# Patient Record
Sex: FEMALE | Race: WHITE | NOT HISPANIC OR LATINO | Employment: FULL TIME | URBAN - METROPOLITAN AREA
[De-identification: names, ages, dates, MRNs, and addresses within clinical notes are randomized per-mention and may not be internally consistent; named-entity substitution may affect disease eponyms.]

---

## 2017-01-13 ENCOUNTER — HOSPITAL ENCOUNTER (OUTPATIENT)
Dept: INFUSION CENTER | Facility: CLINIC | Age: 57
Discharge: HOME | End: 2017-01-13
Payer: COMMERCIAL

## 2017-01-13 PROCEDURE — 96401 CHEMO ANTI-NEOPL SQ/IM: CPT

## 2017-01-13 RX ADMIN — DENOSUMAB 60 MG: 60 INJECTION SUBCUTANEOUS at 09:18

## 2017-03-03 ENCOUNTER — ALLSCRIPTS OFFICE VISIT (OUTPATIENT)
Dept: OTHER | Facility: OTHER | Age: 57
End: 2017-03-03

## 2017-03-18 ENCOUNTER — APPOINTMENT (OUTPATIENT)
Dept: LAB | Facility: MEDICAL CENTER | Age: 57
End: 2017-03-18
Payer: COMMERCIAL

## 2017-03-18 DIAGNOSIS — M81.0 AGE-RELATED OSTEOPOROSIS WITHOUT CURRENT PATHOLOGICAL FRACTURE: ICD-10-CM

## 2017-03-18 DIAGNOSIS — M84.376A STRESS FRACTURE OF FOOT: ICD-10-CM

## 2017-03-18 DIAGNOSIS — Z51.81 ENCOUNTER FOR THERAPEUTIC DRUG LEVEL MONITORING: ICD-10-CM

## 2017-03-18 DIAGNOSIS — Z79.899 OTHER LONG TERM (CURRENT) DRUG THERAPY: ICD-10-CM

## 2017-03-18 DIAGNOSIS — M06.09 RHEUMATOID ARTHRITIS OF MULTIPLE SITES WITHOUT RHEUMATOID FACTOR (HCC): ICD-10-CM

## 2017-03-18 DIAGNOSIS — E55.9 VITAMIN D DEFICIENCY: ICD-10-CM

## 2017-03-18 DIAGNOSIS — E04.1 NONTOXIC SINGLE THYROID NODULE: ICD-10-CM

## 2017-03-18 DIAGNOSIS — E21.3 HYPERPARATHYROIDISM (HCC): ICD-10-CM

## 2017-03-18 LAB
25(OH)D3 SERPL-MCNC: 35.7 NG/ML (ref 30–100)
ALBUMIN SERPL BCP-MCNC: 3.4 G/DL (ref 3.5–5)
ALP SERPL-CCNC: 234 U/L (ref 46–116)
ALT SERPL W P-5'-P-CCNC: 16 U/L (ref 12–78)
ANION GAP SERPL CALCULATED.3IONS-SCNC: 6 MMOL/L (ref 4–13)
AST SERPL W P-5'-P-CCNC: 10 U/L (ref 5–45)
BASOPHILS # BLD AUTO: 0.04 THOUSANDS/ΜL (ref 0–0.1)
BASOPHILS NFR BLD AUTO: 1 % (ref 0–1)
BILIRUB SERPL-MCNC: 0.2 MG/DL (ref 0.2–1)
BUN SERPL-MCNC: 15 MG/DL (ref 5–25)
CALCIUM SERPL-MCNC: 8.5 MG/DL (ref 8.3–10.1)
CHLORIDE SERPL-SCNC: 106 MMOL/L (ref 100–108)
CO2 SERPL-SCNC: 28 MMOL/L (ref 21–32)
CREAT SERPL-MCNC: 0.59 MG/DL (ref 0.6–1.3)
CRP SERPL QL: 23.8 MG/L
EOSINOPHIL # BLD AUTO: 0.07 THOUSAND/ΜL (ref 0–0.61)
EOSINOPHIL NFR BLD AUTO: 2 % (ref 0–6)
ERYTHROCYTE [DISTWIDTH] IN BLOOD BY AUTOMATED COUNT: 14 % (ref 11.6–15.1)
ERYTHROCYTE [SEDIMENTATION RATE] IN BLOOD: 16 MM/HOUR (ref 0–20)
GFR SERPL CREATININE-BSD FRML MDRD: >60 ML/MIN/1.73SQ M
GLUCOSE SERPL-MCNC: 159 MG/DL (ref 65–140)
HCT VFR BLD AUTO: 38.3 % (ref 34.8–46.1)
HGB BLD-MCNC: 11.8 G/DL (ref 11.5–15.4)
LYMPHOCYTES # BLD AUTO: 1.44 THOUSANDS/ΜL (ref 0.6–4.47)
LYMPHOCYTES NFR BLD AUTO: 32 % (ref 14–44)
MCH RBC QN AUTO: 27.6 PG (ref 26.8–34.3)
MCHC RBC AUTO-ENTMCNC: 30.8 G/DL (ref 31.4–37.4)
MCV RBC AUTO: 90 FL (ref 82–98)
MONOCYTES # BLD AUTO: 0.39 THOUSAND/ΜL (ref 0.17–1.22)
MONOCYTES NFR BLD AUTO: 9 % (ref 4–12)
NEUTROPHILS # BLD AUTO: 2.56 THOUSANDS/ΜL (ref 1.85–7.62)
NEUTS SEG NFR BLD AUTO: 56 % (ref 43–75)
NRBC BLD AUTO-RTO: 0 /100 WBCS
PHOSPHATE SERPL-MCNC: 2.3 MG/DL (ref 2.7–4.5)
PLATELET # BLD AUTO: 285 THOUSANDS/UL (ref 149–390)
PMV BLD AUTO: 10 FL (ref 8.9–12.7)
POTASSIUM SERPL-SCNC: 4.1 MMOL/L (ref 3.5–5.3)
PROT SERPL-MCNC: 7.6 G/DL (ref 6.4–8.2)
PTH-INTACT SERPL-MCNC: 146.9 PG/ML (ref 14–72)
RBC # BLD AUTO: 4.27 MILLION/UL (ref 3.81–5.12)
SODIUM SERPL-SCNC: 140 MMOL/L (ref 136–145)
T4 FREE SERPL-MCNC: 0.77 NG/DL (ref 0.76–1.46)
TSH SERPL DL<=0.05 MIU/L-ACNC: 0.71 UIU/ML (ref 0.36–3.74)
WBC # BLD AUTO: 4.5 THOUSAND/UL (ref 4.31–10.16)

## 2017-03-18 PROCEDURE — 83970 ASSAY OF PARATHORMONE: CPT

## 2017-03-18 PROCEDURE — 84100 ASSAY OF PHOSPHORUS: CPT

## 2017-03-18 PROCEDURE — 80053 COMPREHEN METABOLIC PANEL: CPT

## 2017-03-18 PROCEDURE — 85652 RBC SED RATE AUTOMATED: CPT

## 2017-03-18 PROCEDURE — 86140 C-REACTIVE PROTEIN: CPT

## 2017-03-18 PROCEDURE — 84439 ASSAY OF FREE THYROXINE: CPT

## 2017-03-18 PROCEDURE — 84443 ASSAY THYROID STIM HORMONE: CPT

## 2017-03-18 PROCEDURE — 86480 TB TEST CELL IMMUN MEASURE: CPT

## 2017-03-18 PROCEDURE — 36415 COLL VENOUS BLD VENIPUNCTURE: CPT

## 2017-03-18 PROCEDURE — 85025 COMPLETE CBC W/AUTO DIFF WBC: CPT

## 2017-03-18 PROCEDURE — 82306 VITAMIN D 25 HYDROXY: CPT

## 2017-03-24 ENCOUNTER — ALLSCRIPTS OFFICE VISIT (OUTPATIENT)
Dept: OTHER | Facility: OTHER | Age: 57
End: 2017-03-24

## 2017-03-24 ENCOUNTER — TRANSCRIBE ORDERS (OUTPATIENT)
Dept: ADMINISTRATIVE | Facility: HOSPITAL | Age: 57
End: 2017-03-24

## 2017-03-24 DIAGNOSIS — E21.3 HYPERPARATHYROIDISM, UNSPECIFIED (HCC): Primary | ICD-10-CM

## 2017-03-24 DIAGNOSIS — E04.2 NONTOXIC MULTINODULAR GOITER: ICD-10-CM

## 2017-03-24 LAB — QUANTIFERON-TB GOLD IN TUBE: NORMAL

## 2017-03-27 ENCOUNTER — GENERIC CONVERSION - ENCOUNTER (OUTPATIENT)
Dept: OTHER | Facility: OTHER | Age: 57
End: 2017-03-27

## 2017-04-27 DIAGNOSIS — Z72.0 TOBACCO USE: ICD-10-CM

## 2017-04-27 DIAGNOSIS — E04.2 NONTOXIC MULTINODULAR GOITER: ICD-10-CM

## 2017-04-27 DIAGNOSIS — E21.3 HYPERPARATHYROIDISM (HCC): ICD-10-CM

## 2017-04-27 DIAGNOSIS — E55.9 VITAMIN D DEFICIENCY: ICD-10-CM

## 2017-04-27 DIAGNOSIS — M81.0 AGE-RELATED OSTEOPOROSIS WITHOUT CURRENT PATHOLOGICAL FRACTURE: ICD-10-CM

## 2017-04-28 ENCOUNTER — GENERIC CONVERSION - ENCOUNTER (OUTPATIENT)
Dept: OTHER | Facility: OTHER | Age: 57
End: 2017-04-28

## 2017-04-28 ENCOUNTER — HOSPITAL ENCOUNTER (OUTPATIENT)
Dept: ULTRASOUND IMAGING | Facility: HOSPITAL | Age: 57
Discharge: HOME | End: 2017-04-28
Attending: INTERNAL MEDICINE
Payer: COMMERCIAL

## 2017-04-28 DIAGNOSIS — E21.3 HYPERPARATHYROIDISM (HCC): ICD-10-CM

## 2017-04-28 DIAGNOSIS — E55.9 VITAMIN D DEFICIENCY: ICD-10-CM

## 2017-04-28 DIAGNOSIS — E04.2 NONTOXIC MULTINODULAR GOITER: ICD-10-CM

## 2017-04-28 DIAGNOSIS — Z72.0 TOBACCO USE: ICD-10-CM

## 2017-04-28 DIAGNOSIS — M81.0 AGE-RELATED OSTEOPOROSIS WITHOUT CURRENT PATHOLOGICAL FRACTURE: ICD-10-CM

## 2017-04-28 PROCEDURE — 76536 US EXAM OF HEAD AND NECK: CPT

## 2017-05-24 DIAGNOSIS — E55.9 VITAMIN D DEFICIENCY: ICD-10-CM

## 2017-05-24 DIAGNOSIS — E04.2 NONTOXIC MULTINODULAR GOITER: ICD-10-CM

## 2017-05-24 DIAGNOSIS — Z72.0 TOBACCO USE: ICD-10-CM

## 2017-05-24 DIAGNOSIS — E21.3 HYPERPARATHYROIDISM (HCC): ICD-10-CM

## 2017-05-24 DIAGNOSIS — M81.0 AGE-RELATED OSTEOPOROSIS WITHOUT CURRENT PATHOLOGICAL FRACTURE: ICD-10-CM

## 2017-05-27 ENCOUNTER — APPOINTMENT (OUTPATIENT)
Dept: LAB | Facility: MEDICAL CENTER | Age: 57
End: 2017-05-27
Payer: COMMERCIAL

## 2017-05-27 DIAGNOSIS — M81.0 AGE-RELATED OSTEOPOROSIS WITHOUT CURRENT PATHOLOGICAL FRACTURE: ICD-10-CM

## 2017-05-27 DIAGNOSIS — E04.2 NONTOXIC MULTINODULAR GOITER: ICD-10-CM

## 2017-05-27 DIAGNOSIS — E55.9 VITAMIN D DEFICIENCY: ICD-10-CM

## 2017-05-27 DIAGNOSIS — Z72.0 TOBACCO USE: ICD-10-CM

## 2017-05-27 DIAGNOSIS — E21.3 HYPERPARATHYROIDISM (HCC): ICD-10-CM

## 2017-05-27 LAB
25(OH)D3 SERPL-MCNC: 82.7 NG/ML (ref 30–100)
ALBUMIN SERPL BCP-MCNC: 3.4 G/DL (ref 3.5–5)
ALP SERPL-CCNC: 214 U/L (ref 46–116)
ALT SERPL W P-5'-P-CCNC: 17 U/L (ref 12–78)
ANION GAP SERPL CALCULATED.3IONS-SCNC: 5 MMOL/L (ref 4–13)
AST SERPL W P-5'-P-CCNC: 14 U/L (ref 5–45)
BILIRUB DIRECT SERPL-MCNC: 0.07 MG/DL (ref 0–0.2)
BILIRUB SERPL-MCNC: 0.18 MG/DL (ref 0.2–1)
BUN SERPL-MCNC: 15 MG/DL (ref 5–25)
CALCIUM 24H UR-MCNC: <81.5 MG/24 HRS (ref 42–353)
CALCIUM SERPL-MCNC: 8.8 MG/DL (ref 8.3–10.1)
CHLORIDE SERPL-SCNC: 107 MMOL/L (ref 100–108)
CO2 SERPL-SCNC: 29 MMOL/L (ref 21–32)
CREAT 24H UR-MRATE: 0.7 G/24HR (ref 0.6–1.8)
CREAT SERPL-MCNC: 0.54 MG/DL (ref 0.6–1.3)
GFR SERPL CREATININE-BSD FRML MDRD: >60 ML/MIN/1.73SQ M
GLUCOSE SERPL-MCNC: 80 MG/DL (ref 65–140)
PHOSPHATE SERPL-MCNC: 2.6 MG/DL (ref 2.7–4.5)
POTASSIUM SERPL-SCNC: 4.1 MMOL/L (ref 3.5–5.3)
PROT SERPL-MCNC: 7.5 G/DL (ref 6.4–8.2)
PTH-INTACT SERPL-MCNC: 88.2 PG/ML (ref 14–72)
SODIUM SERPL-SCNC: 141 MMOL/L (ref 136–145)
SPECIMEN VOL UR: 1630 ML
SPECIMEN VOL UR: 1630 ML

## 2017-05-27 PROCEDURE — 80076 HEPATIC FUNCTION PANEL: CPT

## 2017-05-27 PROCEDURE — 84075 ASSAY ALKALINE PHOSPHATASE: CPT

## 2017-05-27 PROCEDURE — 36415 COLL VENOUS BLD VENIPUNCTURE: CPT

## 2017-05-27 PROCEDURE — 80048 BASIC METABOLIC PNL TOTAL CA: CPT

## 2017-05-27 PROCEDURE — 83970 ASSAY OF PARATHORMONE: CPT

## 2017-05-27 PROCEDURE — 84100 ASSAY OF PHOSPHORUS: CPT

## 2017-05-27 PROCEDURE — 82306 VITAMIN D 25 HYDROXY: CPT

## 2017-05-27 PROCEDURE — 84080 ASSAY ALKALINE PHOSPHATASES: CPT

## 2017-05-27 PROCEDURE — 82340 ASSAY OF CALCIUM IN URINE: CPT

## 2017-05-27 PROCEDURE — 82570 ASSAY OF URINE CREATININE: CPT

## 2017-06-01 LAB
ALP BONE CFR SERPL: 9 % (ref 14–68)
ALP INTEST CFR SERPL: 0 % (ref 0–18)
ALP LIVER CFR SERPL: 91 % (ref 18–85)
ALP SERPL-CCNC: 205 IU/L (ref 39–117)

## 2017-06-02 ENCOUNTER — ALLSCRIPTS OFFICE VISIT (OUTPATIENT)
Dept: OTHER | Facility: OTHER | Age: 57
End: 2017-06-02

## 2017-06-20 ENCOUNTER — ALLSCRIPTS OFFICE VISIT (OUTPATIENT)
Dept: OTHER | Facility: OTHER | Age: 57
End: 2017-06-20

## 2017-07-14 ENCOUNTER — HOSPITAL ENCOUNTER (OUTPATIENT)
Dept: INFUSION CENTER | Facility: CLINIC | Age: 57
Discharge: HOME | End: 2017-07-14
Payer: COMMERCIAL

## 2017-07-14 PROCEDURE — 96401 CHEMO ANTI-NEOPL SQ/IM: CPT

## 2017-07-14 RX ORDER — AMOXICILLIN 500 MG
1200 CAPSULE ORAL DAILY
COMMUNITY

## 2017-07-14 RX ADMIN — DENOSUMAB 60 MG: 60 INJECTION SUBCUTANEOUS at 09:20

## 2017-07-22 DIAGNOSIS — M81.0 AGE-RELATED OSTEOPOROSIS WITHOUT CURRENT PATHOLOGICAL FRACTURE: ICD-10-CM

## 2017-08-18 DIAGNOSIS — M81.0 AGE-RELATED OSTEOPOROSIS WITHOUT CURRENT PATHOLOGICAL FRACTURE: ICD-10-CM

## 2017-08-18 DIAGNOSIS — Z79.899 OTHER LONG TERM (CURRENT) DRUG THERAPY: ICD-10-CM

## 2017-08-18 DIAGNOSIS — M06.09 RHEUMATOID ARTHRITIS OF MULTIPLE SITES WITHOUT RHEUMATOID FACTOR (HCC): ICD-10-CM

## 2017-08-18 DIAGNOSIS — Z51.81 ENCOUNTER FOR THERAPEUTIC DRUG LEVEL MONITORING: ICD-10-CM

## 2017-09-11 ENCOUNTER — APPOINTMENT (OUTPATIENT)
Dept: RADIOLOGY | Facility: MEDICAL CENTER | Age: 57
End: 2017-09-11

## 2017-09-11 ENCOUNTER — TRANSCRIBE ORDERS (OUTPATIENT)
Dept: URGENT CARE | Facility: MEDICAL CENTER | Age: 57
End: 2017-09-11

## 2017-09-11 DIAGNOSIS — R52 PAIN: Primary | ICD-10-CM

## 2017-09-11 PROCEDURE — 71100 X-RAY EXAM RIBS UNI 2 VIEWS: CPT

## 2017-09-11 PROCEDURE — 71020 HB CHEST X-RAY 2VW FRONTAL&LATL: CPT

## 2017-09-12 ENCOUNTER — ALLSCRIPTS OFFICE VISIT (OUTPATIENT)
Dept: OTHER | Facility: OTHER | Age: 57
End: 2017-09-12

## 2017-10-20 DIAGNOSIS — E04.2 NONTOXIC MULTINODULAR GOITER: ICD-10-CM

## 2017-10-20 DIAGNOSIS — E21.3 HYPERPARATHYROIDISM (HCC): ICD-10-CM

## 2017-10-20 DIAGNOSIS — M81.0 AGE-RELATED OSTEOPOROSIS WITHOUT CURRENT PATHOLOGICAL FRACTURE: ICD-10-CM

## 2017-10-20 DIAGNOSIS — R74.8 ABNORMAL LEVELS OF OTHER SERUM ENZYMES: ICD-10-CM

## 2017-10-20 DIAGNOSIS — E55.9 VITAMIN D DEFICIENCY: ICD-10-CM

## 2017-11-25 ENCOUNTER — APPOINTMENT (OUTPATIENT)
Dept: LAB | Facility: MEDICAL CENTER | Age: 57
End: 2017-11-25
Payer: COMMERCIAL

## 2017-11-25 DIAGNOSIS — E21.3 HYPERPARATHYROIDISM (HCC): ICD-10-CM

## 2017-11-25 DIAGNOSIS — M81.0 AGE-RELATED OSTEOPOROSIS WITHOUT CURRENT PATHOLOGICAL FRACTURE: ICD-10-CM

## 2017-11-25 DIAGNOSIS — E55.9 VITAMIN D DEFICIENCY: ICD-10-CM

## 2017-11-25 DIAGNOSIS — R74.8 ABNORMAL LEVELS OF OTHER SERUM ENZYMES: ICD-10-CM

## 2017-11-25 DIAGNOSIS — E04.2 NONTOXIC MULTINODULAR GOITER: ICD-10-CM

## 2017-11-25 LAB
25(OH)D3 SERPL-MCNC: 79.9 NG/ML (ref 30–100)
ALBUMIN SERPL BCP-MCNC: 3.2 G/DL (ref 3.5–5)
ALP SERPL-CCNC: 197 U/L (ref 46–116)
ALT SERPL W P-5'-P-CCNC: 18 U/L (ref 12–78)
ANION GAP SERPL CALCULATED.3IONS-SCNC: 4 MMOL/L (ref 4–13)
AST SERPL W P-5'-P-CCNC: 14 U/L (ref 5–45)
BILIRUB DIRECT SERPL-MCNC: 0.11 MG/DL (ref 0–0.2)
BILIRUB SERPL-MCNC: 0.33 MG/DL (ref 0.2–1)
BUN SERPL-MCNC: 11 MG/DL (ref 5–25)
CALCIUM SERPL-MCNC: 9.3 MG/DL (ref 8.3–10.1)
CHLORIDE SERPL-SCNC: 104 MMOL/L (ref 100–108)
CO2 SERPL-SCNC: 30 MMOL/L (ref 21–32)
CREAT SERPL-MCNC: 0.59 MG/DL (ref 0.6–1.3)
GFR SERPL CREATININE-BSD FRML MDRD: 103 ML/MIN/1.73SQ M
GLUCOSE SERPL-MCNC: 88 MG/DL (ref 65–140)
PHOSPHATE SERPL-MCNC: 3 MG/DL (ref 2.7–4.5)
POTASSIUM SERPL-SCNC: 4.5 MMOL/L (ref 3.5–5.3)
PROT SERPL-MCNC: 7.7 G/DL (ref 6.4–8.2)
PTH-INTACT SERPL-MCNC: 39.5 PG/ML (ref 14–72)
SODIUM SERPL-SCNC: 138 MMOL/L (ref 136–145)
T4 FREE SERPL-MCNC: 0.87 NG/DL (ref 0.76–1.46)
TSH SERPL DL<=0.05 MIU/L-ACNC: 1.04 UIU/ML (ref 0.36–3.74)

## 2017-11-25 PROCEDURE — 84100 ASSAY OF PHOSPHORUS: CPT

## 2017-11-25 PROCEDURE — 82306 VITAMIN D 25 HYDROXY: CPT

## 2017-11-25 PROCEDURE — 36415 COLL VENOUS BLD VENIPUNCTURE: CPT

## 2017-11-25 PROCEDURE — 80076 HEPATIC FUNCTION PANEL: CPT

## 2017-11-25 PROCEDURE — 84439 ASSAY OF FREE THYROXINE: CPT

## 2017-11-25 PROCEDURE — 83970 ASSAY OF PARATHORMONE: CPT

## 2017-11-25 PROCEDURE — 80048 BASIC METABOLIC PNL TOTAL CA: CPT

## 2017-11-25 PROCEDURE — 84443 ASSAY THYROID STIM HORMONE: CPT

## 2017-11-30 ENCOUNTER — GENERIC CONVERSION - ENCOUNTER (OUTPATIENT)
Dept: OTHER | Facility: OTHER | Age: 57
End: 2017-11-30

## 2017-12-01 ENCOUNTER — GENERIC CONVERSION - ENCOUNTER (OUTPATIENT)
Dept: OTHER | Facility: OTHER | Age: 57
End: 2017-12-01

## 2018-01-09 NOTE — RESULT NOTES
Message   pls get the affirma results when available     Verified Results  (1) FINE NEEDLE ASPIRATION [id number] 10:05AM Jimbo Chairez     Test Name Result Flag Reference   LAB AP CASE REPORT (Report)     Non-gynecologic Cytology             Case: [id number]                  Authorizing Provider: Surendra Govea MD      Collected:      06/09/2016 1005        Ordering Location:   [address] [place] [address]   Received:      06/09/2016 [address] [place]  Ultrasound                              Pathologist:      Yi Kidd MD                              Specimens:  A) - Thyroid, Left, mid pole                                      B) - Thyroid, Left, mid pole   LAB AP CYTO FINAL DIAGNOSIS (Report)     A - B  Thyroid, left, mid pole (Thin prep and smear preparations)  Follicular lesion of undetermined significance ([place] Category III) -   See note  Atypical follicular cells with some overlapping/crowding and scant   colloid  Satisfactory for evaluation  Note:  (1) As reported in the [facility] for Reporting Thyroid   Cytopathology*, this diagnostic category has demonstrated anywhere from 5%   - 15% risk of malignancy being found in subsequent resections  The manual   reports that the usual management following this diagnosis is repeating   the FNA, no sooner than 3 months time (sooner if sudden change in size   occurs)  Ultimately, clinical/imaging correlation for this patient is   needed in arriving at the actual management plan  *The [place] System for Reporting Thyroid Cytopathology, Ebb Sessions ;   Abdiaziz Bassett [facility] ) 2010 (1st Ed )  Interpretation performed at [facility], [address] [facility] [address]   LAB AP NONGYN NOTE (Report)     The treating physician has requested a sample(s) from the above thyroid   nodule(s) be sent for analysis by  [facility] Gene Expression  ([facility] performed by [facility] [address] [place] [address])   [facility] only performs this test on specimen(s)   receiving a cytologic diagnosis of [place] Category III or IV  If such a   diagnosis is rendered (above) specimen will be sent to THE [facility] [place] [place], and a   separate report with results of Afirma GEC will follow directly [facility]   [facility] (typically taking 14 days)  Since the cytologic interpretation is   [place] category III, the specimen will be sent for Afirma GEC testing,   to be separately reported by the THE [facility] [place] laboratory with a copy posted to   the medical record  LAB AP INTRAOPERATIVE CONSULTATION      Thyroid, left mid pole, FNAB on-site evaluation: Not adequate- request   more passes    [facility]  LAB AP GROSS DESCRIPTION      A  Thyroid, Left, mid pole: 20ml  Bloody, received in CytoLyt    B  Thyroid, Left, mid pole: 10 slides received ( 5 diff quik / 5 alcohol   fixed )   LAB AP NONGYN ADDITIONAL INFORMATION (Report)     [employer]'s FDA approved ,  and ThinPrep Imaging System are   utilized with strict adherence to the 's instruction manual to   prepare gynecologic and non-gynecologic cytology specimens for the   production of ThinPrep slides as well as for gynecologic ThinPrep imaging  These processes have been validated by our laboratory and/or by the     These tests were developed and their performance characteristics   determined by [facility] or [address] [place] [address]  They may not be cleared or approved by the [place]  Food and   Drug Administration  The FDA has determined that such clearance or   approval is not necessary  These tests are used for clinical purposes  They should not be regarded as investigational or for research  This   laboratory has been approved by [place] [id number], designated as a high-complexity   laboratory and is qualified to perform these tests  LAB AP CLINICAL INFORMATION      Size: 1 26X 83X1CM   Margins: SMOOTH Echogenicity: SOLID/CYSTIC Microcalcs: ABSENT Flow: INTRANODULAR/PERIPHERAL Size change: MINIMAL Suspicion level: NOT GIVEN Hx of Hashimoto's Thyroiditis: NO     US PATHOLOGY REPORT (NO CHARGE) [id number] 09:42AM Tomas Link    Order Number: [id number]   Performing Comments: plsy left upper and mid pole nodules which were FLUS on last fna results  pls use AFFIRMA   - Patient Instructions: To schedule this appointment, please contact Central Scheduling at [phone]  Test Name Result Flag Reference   US PATHOLOGY REPORT (NO CHARGE) (Report)     Dear Dr Slaughter Lights:     Thank you for referring the above named patient for ultrasound-guided fine needle aspiration biopsy of the thyroid  I have reviewed the cytology report provided by Dr Art Fu  You should have received a copy of the report, but if not, you may access    the report through the [de-identified] Web Portal or by contacting the lab office at [phone]  The results are suspicious for follicular lesion of undetermined significance  The following is an excerpt from the cytology report; left mid pole, follicular lesion of undetermined significance ([place] category 3)  These findings are concordant with imaging  The samples have been sent for [facility] testing, a report of which will be added to the patient's medical record separately  If there are any questions or concerns, please do not hesitate to contact me at [phone]       Sincerely,       Wing Knutson          Workstation performed: [id number]

## 2018-01-10 NOTE — PROGRESS NOTES
Assessment    1  Rheumatoid arthritis of multiple sites with negative rheumatoid factor (714 0) (M06 09)   2  Encounter for monitoring leflunomide therapy (V58 83,V58 69) (Z51 81,Z41 899)   3  Long-term use of hydroxychloroquine (V58 69) (Z50 899)    Plan    1  Follow-up visit in 2 months Evaluation and Treatment  Follow-up  Status: Hold For -   Scheduling  Requested for: 98MNG5768    2  Call [phone] if: The pain seems worse ; Status:Complete;   Done: 03UHT9746   3  Call [phone] if: The symptoms seem worse ; Status:Complete;   Done:   51FUA9258   4  Call [phone] if: You have questions or concerns about your problem ;   Status:Complete;   Done: 09RGN4055    Discussion/Summary    Ms Jihan Chandler was diagnosed with a parathyroid adenoma since her last evaluation  She had an extensive Endocrinology workup, and was found to be severely osteoporotic because of the hyperparathyroidism  She is scheduled to undergo a parathyroidectomy on Thursday of next week  She continues to have significant pain in her left wrist, as well as her right shoulder  She does report that her right shoulder pain worsened after the cortisone injection in her last visit  She has been using Aspercreme on her left wrist and shoulder, which seems to improve her pain, as well as decrease the swelling in her left wrist  She reports that her right ankle has been feeling relatively well, and the swelling has decreased on its own  She continues to have significant weight loss as well as a poor appetite, which she attributes to the hypercalcemia  On exam, there is synovitis of the left wrist with decreased range of motion of the left wrist  There is tenderness to palpation of the right shoulder and right ankle  There is no other active synovitis  Review of laboratory studies revealed a markedly elevated alkaline phosphatase at 210  A DEXA scan did show significant osteoporosis  CBC was within normal limits   ESR and CRP were elevated in December  At this time, her rheumatoid arthritis does appear active despite leflunomide and Plaquenil  It is possible that the Plaquenil has not reach its full efficacy yet, as she is only been on it for less than 2 months  I will not make any changes to her medication regimen at this time, as she is undergoing surgery next week  I will plan to reevaluate her in 2 months  If she continues to remain symptomatic at that time, we may consider adjusting her medications  She will call in interim if there are any questions or concerns  Counseling  Rheumatology Counseling Documentation: The patient was counseled regarding diagnostic results, instructions for management and impressions  Chief Complaint  F/U RA   Patient is here today for follow up of chronic conditions described in HPI  History of Present Illness  Pt  returns for F/U for RA  Had a parathyroid adenoma which is to be removed next weak  Has severe OP due to this  Still with L wrist pain, which is improved greatly with Aspercreme  Also with R shoulder pain, which was worsened with cortisone injection  Has multiple thyroid nodules as well  Ankle pain has improved since last visit  Taking Advil as needed for pain with good relief  + swelling in L wrist -> improved  + AM stiffness x several minutes  + difficulty sleeping -> not due to pain  + non-restorative sleep  + fatigue  RAPID3: 4 8/30      Review of Systems    Constitutional: fatigue, recent 5-6 lb weight loss and anorexia, but no fever, no recent weight gain and no chills  HEENT: blurred vision, dryness mouth and feeling congested, but no double vision, no amaurosis fugax, no dryness of the eyes, no eye pain, no erythema eye(s), no mouth sores and no sore throat  Cardiovascular: no chest pain or pressure, no dyspnea on exertion and no swelling in the arms or legs  Respiratory: no unusual or persistent cough, no shortness of breath and no pleurisy     Gastrointestinal: no abdominal pain, no vomiting, no heartburn, no diarrhea, no constipation, no melena and no BRBPR    The patient presents with complaints of occasional episodes of nausea  Genitourinary: no foamy urine and increased frequency, but no dysuria and no hematuria  Integumentary alopecia, but no rash, no Raynaud's, no nail changes and no photosensitivity  Endocrine heat or cold intolerance, but no polyuria and no polydipsia  Hematologic/Lymphatic: a tendency for easy bruising, but no unusual bleeding  Neurological: tingling, but no headache, no vertigo or dizziness and no weakness  Psychiatric: insomnia and non-restorative sleep  Active Problems    1  CRP elevated (790 95) (R79 82)   2  Elevated alkaline phosphatase level (790 5) (R74 8)   3  Encounter for monitoring leflunomide therapy (V58 83,V58 69) (Z51 81,Z79 899)   4  History of anemia (V12 3) (Z86 2)   5  Hypercalcemia (275 42) (E83 52)   6  Hyperparathyroidism (252 00) (E21 3)   7  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)   8  Multiple thyroid nodules (241 1) (E04 2)   9  Osteoporosis (733 00) (M81 0)   10  Parathyroid adenoma (227 1) (D35 1)   11  Rheumatoid arthritis of multiple sites with negative rheumatoid factor (714 0) (M06 09)   12  Rotator cuff tendinitis, right (726 10) (M75 81)   13  Seizure disorder (345 90) (G40 909)   14  Swelling of joint of left wrist (719 03) (M25 432)   15  Thyroid nodule (241 0) (E04 1)   16  Vitamin D deficiency (268 9) (E55 9)    Past Medical History    1  History of anemia (V12 3) (Z86 2)   2  History of arthritis (V13 4) (Z87 39)   3  History of cataract (V12 49) (Z86 69)   4  History of thyroid disease (V12 29) (Z86 39)   5  History of Left wrist pain (719 43) (M25 532)   6  Seizure disorder (345 90) (G40 909)   7  History of Seronegative rheumatoid arthritis (714 0) (M06 00)    The active problems and past medical history were reviewed and updated today  Surgical History    1   History of Arthrodesis Thumb Carpometacarpal Joint   2  History of Bunion Correction With Metatarsal Osteotomy   3  History of Cataract Surgery   4  History of Tubal Ligation   5  History of Wrist Surgery    The surgical history was reviewed and updated today  Family History    1  Family history of arthritis (V17 7) (Z82 61)    2  Family history of malignant neoplasm of thyroid (V16 8) (Z80 8)    3  Family history of rheumatoid arthritis (V17 7) (Z82 61)    4  Family history of Diabetes (250 00) (E11 9)   5  Denied: Family history of psoriasis   6  Family history of systemic lupus erythematosus (V19 4) (Z82 69)   7  Denied: Family history of ulcerative colitis    The family history was reviewed and updated today  Social History    · Current smoker (305 1) (F17 200)   · Drinks coffee   · No drug use   · Occasional alcohol use  The social history was reviewed and updated today  The social history was reviewed and is unchanged  Current Meds   1  Advil 200 MG Oral Tablet; TAKE 2 TABLET 3 times daily PRN pain; Therapy: (Recorded:95Fgr0751) to Recorded   2  Hydroxychloroquine Sulfate 200 MG Oral Tablet; take 1 tablet daily with food; Therapy: 21IZQ0797 to (Evaluate:15Jun2016)  Requested for: 50DMT6667; Last   Rx:21Ucp4430 Ordered   3  Leflunomide 20 MG Oral Tablet; TAKE 1 TABLET DAILY; Therapy: 03WNY8625 to (Evaluate:62Bup9754)  Requested for: 61JZN5903; Last   Rx:77Xsd6302 Ordered   4  Phenytoin Sodium 100 MG CAPS; TAKE 1 CAPSULE 3 TIMES DAILY; Therapy: (Recorded:68Dpo8927) to Recorded   5  Vitamin D 2000 UNIT Oral Tablet; Take 1 tablet daily; Therapy: 69LUO7400 to (Evaluate:76Gfw1713) Recorded    The medication list was reviewed and updated today  Allergies    1  Penicillins    2   Pollen    Vitals  Signs [Data Includes: Current Encounter]   Recorded: 30PQF7077 11:05AM   Heart Rate: 78  Systolic: 92  Diastolic: 62  Weight: 79 lb   BMI Calculated: 14 93  BSA Calculated: 1 27    Physical Exam    Constitutional   General appearance: No acute distress, well appearing and well nourished  Eyes   Conjunctiva and lids: No swelling, erythema or discharge  Pupils and irises: Equal, round and reactive to light  Ears, Nose, Mouth, and Throat   External inspection of ears and nose: Normal     Oropharynx: Abnormal   (+ poor dentition)   Pulmonary   Respiratory effort: No increased work of breathing or signs of respiratory distress  Auscultation of lungs: Abnormal   Auscultation of the lungs revealed decreased breath sounds diffusely  Cardiovascular   Auscultation of heart: Normal rate and rhythm, normal S1 and S2, without murmurs  Examination of extremities for edema and/or varicosities: Normal     Lymphatic   Palpation of lymph nodes in neck: No lymphadenopathy  Psychiatric   Orientation to person, place, and time: Normal     Mood and affect: Normal         Right glenohumeral joint tenderness and restricted ROM  Left wrist tenderness, swelling and restricted ROM  Right ankle tenderness  Musculoskeletal - Joints, bones, and muscles: Abnormal  Palpation - bilateral knee crepitus  Skin - Skin and subcutaneous tissue: Normal    Neurologic - Sensation: Normal       Results/Data  Results   (1) CBC/PLT/DIFF 30WFW5970 10:58AM Marti Foley     Test Name Result Flag Reference   WBC COUNT 5 59 Thousand/uL  4 31-10 16   RBC COUNT 3 58 Million/uL L 3 81-5 12   HEMOGLOBIN 9 6 g/dL L 11 5-15 4   HEMATOCRIT 31 1 % L 34 8-46  1   MCV 87 fL  82-98   MCH 26 8 pg  26 8-34 3   MCHC 30 9 g/dL L 31 4-37 4   RDW 13 6 %  11 6-15 1   MPV 9 3 fL  8 9-12 7   PLATELET COUNT 403 Thousands/uL  149-390   NEUTROPHILS RELATIVE PERCENT 57 %  43-75   LYMPHOCYTES RELATIVE PERCENT 26 %  14-44   MONOCYTES RELATIVE PERCENT 14 % H 4-12   EOSINOPHILS RELATIVE PERCENT 2 %  0-6   BASOPHILS RELATIVE PERCENT 1 %  0-1   NEUTROPHILS ABSOLUTE COUNT 3 20 Thousands/µL  1 85-7 62   LYMPHOCYTES ABSOLUTE COUNT 1 47 Thousands/µL  0 60-4 47   MONOCYTES ABSOLUTE COUNT 0 77 Thousand/µL  0 17-1 22   EOSINOPHILS ABSOLUTE COUNT 0 09 Thousand/µL  0 00-0 61   BASOPHILS ABSOLUTE COUNT 0 06 Thousands/µL  0 00-0 10     (1) BASIC METABOLIC PROFILE 51SPP4930 10:58AM [facility] Kidney Disease Education Program recommendations are as follows:  GFR calculation is accurate only with a steady state creatinine  Chronic Kidney disease less than 60 ml/min/1 73 sq  meters  Kidney failure less than 15 ml/min/1 73 sq  meters  Test Name Result Flag Reference   GLUCOSE,RANDM 79 mg/dL     If the patient is fasting, the ADA then defines impaired fasting glucose as > 100 mg/dL and diabetes as > or equal to 123 mg/dL  SODIUM 141 mmol/L  136-145   POTASSIUM 4 0 mmol/L  3 5-5 3   CHLORIDE 105 mmol/L  100-108   CARBON DIOXIDE 30 mmol/L  21-32   ANION GAP (CALC) 6 mmol/L  4-13   BLOOD UREA NITROGEN 18 mg/dL  5-25   CREATININE 0 60 mg/dL  0 60-1 30   Standardized to IDMS reference method   CALCIUM 10 2 mg/dL H 8 3-10 1   eGFR Non-African American      >60 0 ml/min/1 73sq m     * DXA BONE DENSITY SPINE HIP AND PELVIS 28Jan2016 09:18AM Jimmy Patricio     Test Name Result Flag Reference   DXA BONE DENSITY SPINE HIP AND PELVIS (Report)     CENTRAL DXA SCAN     CLINICAL HISTORY:  48year old post-menopausal  female with personal history of epilepsy and degenerative arthritis  There is a maternal history of hip fracture  The patient walks and does not take calcium or vitamin D supplements  TECHNIQUE: Bone densitometry was performed using a Hologic Horizon A bone densitometer  Regions of interest appear properly placed  There are no obvious fractures or other confounding variables which could limit the study  COMPARISON: None       RESULTS:    LUMBAR SPINE: L1-L4:   BMD 0 696 gm/cm2   T-score -3 2   Z-score -2 1     LEFT TOTAL HIP:   BMD 0 555 gm/cm2   T-score -3 2   Z-score -2 5     LEFT FEMORAL NECK:   BMD 0 426 gm/cm2   T-score -3 8   Z-score -2 8 ASSESSMENT:   1  Based on the WHO classification, the T-scores of -3 8, -3 2 and -3 2 in the left femoral neck, total left hip and lumbar spine are consistent with OSTEOPOROSIS  2  Comparison of bone mineral density to age, sex and race matched peers is not entirely favorable and secondary causes of osteoporosis should be excluded  3  According to the [address] [place] [address], prescription therapy is recommended with a T-score of -2 5 or less in the spine or hip  4  A daily intake of at least 1200 mg Calcium and 800 to 1000 IU of Vitamin D, as well as weight bearing and muscle strengthening exercise, fall prevention and avoidance of tobacco and excessive alcohol intake as basic preventive measures are    suggested  5  Repeat DXA scan in 18-24 months as clinically indicated  WHO CLASSIFICATION:   Normal (a T-score of -1 0 or higher)   Low bone mineral density (a T-score of less than -1 0 but higher than -2 5)   Osteoporosis (a T-score of -2 5 or less)   Severe osteoporosis (a T-score of -2 5 or less with a fragility fracture)     US THYROID 28Jan2016 09:18AM Ples [facility]     Test Name Result Flag Reference   US THYROID (Report)     THYROID ULTRASOUND     INDICATION: Hyperparathyroid, evaluate for nodules  COMPARISON: 9/11/2006     TECHNIQUE:  Ultrasound of the thyroid was performed with a high frequency linear transducer in transverse and sagittal planes including volumetric imaging sweeps as well as traditional still imaging technique  FINDINGS:   Normal homogeneous smooth echotexture  Right gland: 1 9 x 5 1 x 2 4 cm  There are multiple right-sided thyroid nodules the majority of which appear to represent cysts  There is a heterogeneous nodule at the right thyroid medial lower pole measuring 6 x 5 x 5 mm with coarse calcifications  Left gland: 1 6 x 5 3 x 2 1 cm  There are multiple left-sided thyroid nodules   There is a 16 x 8 x 7 mm hypodense nodule in the posterior aspect of the upper pole left lobe of the thyroid  This appears to be within the thyroid versus just posterior to the thyroid and is    hypervascular  There is a 10 x 12 x 8 mm mixed echogenicity nodule at the upper pole/mid pole of the left lobe with internal coarse calcifications  This has internal vascularity  There is a 17 x 24 x 14 mm heterogeneous septated nodule with some increased vascularity at the lower pole left lobe of the thyroid  There is an 11 x 4 x 9 mm hypoechoic nodule at the lower pole left lobe of the thyroid anterior  Isthmus: 0 44 cm in AP dimension  There is a 10 x 4 x 7 mm nodule within the isthmus  IMPRESSION:      1  Bilateral thyroid nodules  Dominant mixed echogenicity septated nodule at the lower pole left lobe of the thyroid measuring up to 24 mm meets criteria for fine needle aspiration  This may have been aspirated in 2006 however is significantly larger at this time  2  16 mm nodule at the posterior upper left lobe of the thyroid may be within or just posterior to the thyroid gland  This also could be sampled utilizing fine needle aspiration  Signed by:   Nichol Yoon MD   1/28/16     (1) HEPATIC FUNCTION PANEL [id number] 09:26AM [facility] Order Number: [id number]     Test Name Result Flag Reference   ALBUMIN 3 3 g/dL L 3 5-5 0   ALK PHOSPHATAS 210 U/L H    ALT (SGPT) 11 U/L L 12-78   AST(SGOT) 12 U/L  5-45   BILI, DIRECT 0 08 mg/dL  0 00-0 20   BILI, TOTAL 0 27 mg/dL  0 20-1 00   TOTAL PROTEIN 6 8 g/dL  6 4-8 2     * NM BONE SCAN WHOLE BODY [id number] 09:19AM [facility]     Test Name Result Flag Reference   NM BONE SCAN WHOLE BODY (Report)     BONE SCAN WHOLE BODY     INDICATION: Hypercalcemia  History of parathyroid disease   Right shoulder pain and history of arthritis     PREVIOUS FILM CORRELATION:  This study is correlated with right shoulder x-rays, 12/22/2015, left wrist x-rays 4/25/2015, MRI of the left wrist 5/11/2015     TECHNIQUE:  This study was performed following the intravenous administration of 26 7 mCi Tc-99m labeled MDP  Delayed, anterior and posterior whole body images were acquired, 2-3 hours after radiopharmaceutical administration  FINDINGS:        There is no scintigraphic evidence of osseous metastasis  There is increased radiopharmaceutical activity identified in the right shoulder, left wrist, and right ankle  The osseous structures on both sides of the joint are affected in each case  Typical changes of osteoarthritis are seen affecting the right    1st carpometacarpal joint, and left 1st toe  Activity in the spine, skull, and pelvis within normal limits       IMPRESSION:     1  No scintigraphic evidence of osseous metastasis  2  Increased periarticular activity involving the right shoulder, left wrist, and right ankle  In this patient with a history of hyperparathyroidism and elevated calcium levels, although calcium deposition is not visible on the radiographs, the    possibility of crystal-induced arthropathy is suspected       Signed by:   Bryan Gonzales MD   1/22/16     (1) PTH N-TERMINAL (INTACT) [id number] 08:28PM Desmond Lynn     Test Name Result Flag Reference   PARATHYROID HORMONE INTACT 103 6 pg/mL H 14 0-72 0     (1) CBC/PLT/DIFF [id number] 11:23AM Dhara Dire     Test Name Result Flag Reference   DIFFERENTIAL METHOD Automated     WBC COUNT 6 28 Thousand/uL  4 31-10 16   RBC COUNT 4 04 Million/uL  3 81-5 12   HEMOGLOBIN 11 2 g/dL L 11 5-15 4   HEMATOCRIT 35 7 %  34 8-46  1   MCV 88 fL  82-98   MCH 27 7 pg  26 8-34 3   MCHC 31 4 g/dL  31 4-37 4   RDW 14 2 %  11 6-15 1   MPV 9 5 fL  8 9-12 7   PLATELET COUNT 994 Thousand/uL  149-390   nRBC AUTOMATED 0 /100 WBC     NEUTROPHILS RELATIVE PERCENT 61 %  43-75   LYMPHOCYTES RELATIVE PERCENT 27 %  14-44   MONOCYTES RELATIVE PERCENT 10 %  4-12   EOSINOPHILS RELATIVE PERCENT 1 %  0-6   BASOPHILS RELATIVE PERCENT 1 %  0-1   NEUTROPHILS ABSOLUTE COUNT 3 83 Thousand/uL  1 85-7 62   LYMPHOCYTES ABSOLUTE COUNT 1 70 Thousand/uL  0 60-4 47   MONOCYTES ABSOLUTE COUNT 0 63 Thousand/uL  0 17-1 22   EOSINOPHILS ABSOLUTE COUNT 0 06 Thousand/uL  0 00-0 61   BASOPHILS ABSOLUTE COUNT 0 06 Thousand/uL  0 00-0 10     (1) C-REACTIVE PROTEIN [id number] 11:23AM [facility]     Test Name Result Flag Reference   C-REACT PROTEIN 28 6 mg/L H 0 0-2 9     (1) SED RATE [id number] 11:23AM [facility]     Test Name Result Flag Reference   SED RATE 65 mm/hour H 0-20     (1) COMPREHENSIVE METABOLIC PANEL [id number] 03:97TL [facility]   Has the patient been fasting for 10-12 hours? -     Test Name Result Flag Reference   SODIUM 140 mmol/L  136-145   POTASSIUM 4 2 mmol/L  3 5-5 3   CHLORIDE 108 mmol/L  100-108   CARBON DIOXIDE 26 mmol/L  21-32   ANION GAP (CALC) 6 mmol/L  4-13   BILI, TOTAL 0 23 mg/dL  0 20-1 00   TOTAL PROTEIN 8 0 g/dL  6 4-8 2   ALK PHOSPHATAS 223 U/L H    ALT (SGPT) 14 U/L  12-78   AST(SGOT) 11 U/L  5-45   GLUCOSE,RANDM 78 mg/dL     If patient is fasting, the ADA then defines impaired fasting glucose as  >100 mg/dl and diabetes as  >or equal to 126 mg/dl  ALBUMIN 3 0 g/dL L 3 5-5 0   BLOOD UREA NITROGEN 10 mg/dL  5-25   CALCIUM 10 9 mg/dL H 8 3-10 1   CREATININE 0 58 mg/dL L 0 60-1 30   Standardized to IDMS reference method   eGFR African American >60 ml/min/1 73sq m     [facility] Disease Education Program recommendations are as  follows:  GFR calculation is accurate only with a steady state creatinine  Chronic Kidney disease less than 60 ml/min/1 73 sq  meters  Kidney failure less than 15 ml/min/1 73 sq  meters  eGFR Non-African American >60 ml/min/1 73sq m         Future Appointments    Date/Time Provider Specialty Site   04/01/2016 08:50 AM PEPE Lockwood   Endocrinology [facility]   04/22/2016 10:40 AM Cesar Martel DO Rheumatology [facility]   02/25/2016 08:00 AM Leeanna Quintanilla MD Surgical Oncology [facility]   03/09/2016 09:00 AM Leeanna Quintanilla MD Surgical Oncology [facility] SURGICAL ONCOL[facility]     Signatures   Electronically signed by : Pavel Mauricio DO; Feb 19 2016  1:02PM EST                       (Author)

## 2018-01-10 NOTE — PROGRESS NOTES
Assessment    1  Rheumatoid arthritis of multiple sites with negative rheumatoid factor (714 0) (M06 09)   2  Osteoporosis (733 00) (M81 0)   3  History of Vitamin D deficiency (268 9) (E55 9)   4  Encounter for monitoring leflunomide therapy (V58 83,V58 69) (Z51 81,Z79 899)   5  Long-term use of hydroxychloroquine (V58 69) (Z79 899)   6  Hyperparathyroidism (252 00) (E21 3)   7  Elevated alkaline phosphatase level (790 5) (R74 8)   8  Seizure disorder (345 90) (G40 909)    Plan     1  (1) CBC/PLT/DIFF; Status:Active; Requested for:99Rdy4693;    2  (1) COMPREHENSIVE METABOLIC PANEL; Status:Active; Requested for:35Vkx3597;    3  (1) C-REACTIVE PROTEIN; Status:Active; Requested for:73Ufh4251;    4  (1) SED RATE; Status:Active; Requested for:39One8566;     5  Leflunomide 20 MG Oral Tablet; TAKE 1 TABLET DAILY    6  Call [phone] if: The pain seems worse ; Status:Complete;   Done: 48BDR5405   7  Call [phone] if: The symptoms seem worse ; Status:Complete;   Done:   60QXW1723   8  Call [phone] if: You have questions or concerns about your problem ;   Status:Complete;   Done: 66SHV8236    9  Hydroxychloroquine Sulfate 200 MG Oral Tablet; Take 1 tablet daily    Follow-up visit in 3 months Evaluation and Treatment  Follow-up  Status: Hold For - Scheduling  Requested for: 39XCZ0035  Ordered; For: Encounter for monitoring leflunomide therapy, Long-term use of hydroxychloroquine, Osteoporosis, Rheumatoid arthritis of multiple sites with negative rheumatoid factor;  Ordered By: Cesar Martel  Performed:   Due: 57HFI9831     Discussion/Summary    Ms Zita Rodríguez has been feeling okay with the exception of some right thumb pain  She did start on fish oil with some relief of her pain  She did not start on the Simponi Aria infusions   She did increase her vitamin D at the recommendation of Dr Eri Bullard  She is also due for Prolia again next month, but she did experience significant arthralgias after her first injection  She has not been utilizing any pain medications at this time  She does note swelling in her right thumb  She denies any other obvious joint swelling  She reports morning stiffness mainly in her right ankle lasting several minutes before improvement  She denies any difficulty sleeping due to pain, but she does report nonrestorative sleep and fatigue  Her last eye exam occur 2 years ago  On exam, there is mild synovitis of the right first MCP, as well as the right ankle  There is also swelling and decreased range of motion of the left wrist without tenderness  She does have mild osteoarthritic changes of the hands, as well as crepitus of the bilateral knees  Review of recent laboratory studies did reveal a hepatic function profile with an elevated alkaline phosphatase, but AST and ALT were within normal limits  A recent CBC, ESR, and CRP were not obtained  A recent BMP was essentially normal as well  At this time, her seronegative rheumatoid arthritis does appear mildly active but relatively stable  She would like to continue with the fish oil for now, and hold off on pursuing the Simponi Aria infusions  If she does have persistently elevated inflammatory markers at her follow-up, as well as active synovitis, we will consider starting the [address] at that time  She will continue the Plaquenil and leflunomide at their current doses  She is due for an updated eye exam with visual fields  I would like to obtain an updated CBC, CMP, ESR, and CRP before her follow-up  I will reevaluate her in 3 months  She will call in interim if there are any questions or concerns  Patient is able to Self-Care  Counseling  Rheumatology Counseling Documentation: The patient was counseled regarding diagnostic results, instructions for management, impressions and risks and benefits of treatment options  Chief Complaint  F/U RA   Patient is here today for follow up of chronic conditions described in HPI  History of Present Illness  Pt  returns for F/U for RA  Feeling OK except for R thumb  Started fish oil with some relief in pain  Increased Vitamin D at recommendation of Dr Jillian Remy  Due for Prolia again next month, but had significant aching after the injection  No pain meds required  Did not start Reginia Larger since last visit  + swelling in R thumb  No other obvious joint swelling  + AM stiffness in R ankle x several minutes  No difficulty sleeping due to pain  + non-restorative sleep  + fatigue  Last eye exam in 2 years ago  RAPID3: not completed      Review of Systems    Constitutional: fatigue, but no fever, no recent weight gain, no chills, no recent weight loss and no anorexia  HEENT: dryness mouth and feeling congested, but no blurred vision, no double vision, no amaurosis fugax, no dryness of the eyes, no eye pain, no erythema eye(s), no mouth sores and no sore throat  Cardiovascular: no chest pain or pressure, no dyspnea on exertion and no swelling in the arms or legs  Respiratory: no unusual or persistent cough, no shortness of breath and no pleurisy  Gastrointestinal: heartburn, but no abdominal pain, no nausea, no vomiting, no diarrhea, no constipation and no BRBPR    The patient presents with complaints of melena (2/2 Fe supplement)  Genitourinary: no foamy urine and increased frequency, but no dysuria and no hematuria  Musculoskeletal: as noted in HPI  Integumentary no rash, no Raynaud's, no alopecia, no nail changes and no photosensitivity  Endocrine no polydipsia  Hematologic/Lymphatic: no unusual bleeding    The patient presents with complaints of a tendency for easy bruising  Symptoms are unchanged  Neurological: headache, but no vertigo or dizziness, no tingling and no weakness  Psychiatric: non-restorative sleep, but no insomnia  Active Problems    1  Abnormal liver ultrasound (793 3) (R93 2)   2  Abnormal SPEP (790 99) (R77 8)   3   Anemia, deficiency (281 9) (D53 9)   4  Elevated alkaline phosphatase level (790 5) (R74 8)   5  Encounter for monitoring leflunomide therapy (V58 83,V58 69) (Z51 81,Z79 899)   6  Fracture, stress, metatarsal (733 94) (M84 376A)   7  History of anemia (V12 3) (Z86 2)   8  Hyperparathyroidism (252 00) (E21 3)   9  Long-term use of hydroxychloroquine (V58 69) (Z79 899)   10  Multiple thyroid nodules (241 1) (E04 2)   11  Osteoporosis (733 00) (M81 0)   12  Rheumatoid arthritis of multiple sites with negative rheumatoid factor (714 0) (M06 09)   13  Rotator cuff tendinitis, right (726 10) (M75 81)   14  Seizure disorder (345 90) (G40 909)   15  Shoulder pain (719 41) (M25 519)   16  Thyroid nodule (241 0) (E04 1)   17  Tobacco abuse (305 1) (Z72 0)    Past Medical History    1  History of CRP elevated (790 95) (R79 82)   2  History of anemia (V12 3) (Z86 2)   3  History of arthritis (V13 4) (Z87 39)   4  History of cataract (V12 49) (Z86 69)   5  History of hypercalcemia (V12 29) (Z86 39)   6  History of thyroid disease (V12 29) (Z86 39)   7  History of Left wrist pain (719 43) (M25 532)   8  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)   9  History of Parathyroid adenoma (227 1) (D35 1)   10  Seizure disorder (345 90) (G40 909)   11  History of Seronegative rheumatoid arthritis (714 0) (M06 00)   12  History of Swelling of joint of left wrist (719 03) (M25 432)   13  History of Vitamin D deficiency (268 9) (E55 9)    The active problems and past medical history were reviewed and updated today  Surgical History    1  History of Arthrodesis Thumb Carpometacarpal Joint   2  History of Bunion Correction With Metatarsal Osteotomy   3  History of Cataract Surgery   4  History of Parathyroid Resection   5  History of Tubal Ligation   6  History of Wrist Surgery    The surgical history was reviewed and updated today  Family History  Mother    1  Family history of arthritis (V17 7) (Z82 61)  Daughter    2   Family history of malignant neoplasm of thyroid (V16 8) (Z80 8)  Maternal Grandmother    3  Family history of rheumatoid arthritis (V17 7) (Z82 61)  Family History    4  Family history of Diabetes (250 00) (E11 9)   5  Denied: Family history of psoriasis   6  Family history of systemic lupus erythematosus (V19 4) (Z82 69)   7  Denied: Family history of ulcerative colitis    The family history was reviewed and updated today  Social History    · Current smoker (305 1) (F17 200)   · Drinks coffee   · No drug use   · Occasional alcohol use  The social history was reviewed and updated today  The social history was reviewed and is unchanged  Current Meds   1  Advil 200 MG Oral Tablet; TAKE 2 TABLET 3 times daily PRN pain; Therapy: (Recorded:09Feb2016) to Recorded   2  Fish Oil 1000 MG Oral Capsule; take 1 capsule daily; Therapy: (Recorded:02Jun2017) to Recorded   3  Hydroxychloroquine Sulfate 200 MG Oral Tablet; Take 1 tablet daily; Therapy: (Recorded:02Jun2017) to Recorded   4  Leflunomide 20 MG Oral Tablet; TAKE 1 TABLET DAILY; Therapy: 39HGA9661 to (Evaluate:20Sep2017)  Requested for: 49CAW7270 Recorded   5  Phenytoin Sodium 100 MG CAPS; TAKE 1 CAPSULE 3 TIMES DAILY; Therapy: (Recorded:09Feb2016) to Recorded   6  Prolia 60 MG/ML Subcutaneous Solution; INJECT SUBCUTANEOUSLY  60 MG / 1 ML   EVERY 6 MONTHS; Therapy: 41Xbu2197 to (Last Rx:33Sow8035) Ordered   7  Vitamin D 2000 UNIT Oral Tablet; TAKE 3 TABLET BY MOUTH DAILY; Therapy: 07XJL3444 to (Evaluate:20Sep2017); Last Rx:24Mar2017 Ordered    The medication list was reviewed and updated today  Allergies    1  Penicillins    2  Pollen    Vitals  Signs   Recorded: 02Jun2017 01:30PM   Heart Rate: 80  Systolic: 552  Diastolic: 60  Weight: 89 lb   BMI Calculated: 16 82  BSA Calculated: 1 34    Physical Exam    Constitutional   General appearance: Abnormal   chronically ill and underweight  Eyes   Conjunctiva and lids: No swelling, erythema or discharge      Pupils and irises: Equal, round and reactive to light  Ears, Nose, Mouth, and Throat   External inspection of ears and nose: Normal     Oropharynx: Abnormal   (+ poor dentition)   Pulmonary   Respiratory effort: No increased work of breathing or signs of respiratory distress  Auscultation of lungs: Abnormal   Auscultation of the lungs revealed decreased breath sounds diffusely  Cardiovascular   Auscultation of heart: Normal rate and rhythm, normal S1 and S2, without murmurs  Examination of extremities for edema and/or varicosities: Normal     Lymphatic   Palpation of lymph nodes in neck: No lymphadenopathy  Psychiatric   Orientation to person, place, and time: Normal     Mood and affect: Normal       Right thumb MCP tenderness and swelling  Left wrist swelling and restricted ROM  Right ankle tenderness and swelling  Musculoskeletal - Joints, bones, and muscles: Abnormal  Palpation - bilateral knee crepitus  Skin - Skin and subcutaneous tissue: Normal    Neurologic - Sensation: Normal       Results/Data  (1) HEPATIC FUNCTION PANEL 85QFF9658 09:53AM Miki Maxwell    Order Number: [id number]     Test Name Result Flag Reference   ALBUMIN 3 4 g/dL L 3 5-5 0   - Patient Instructions:  This is a non fasting blood test  Please drink two glasses of water the morning of test    ALK PHOSPHATAS 214 U/L H    ALT (SGPT) 17 U/L  12-78   AST(SGOT) 14 U/L  5-45   BILI, DIRECT 0 07 mg/dL  0 00-0 20   BILI, TOTAL 0 18 mg/dL L 0 20-1 00   TOTAL PROTEIN 7 5 g/dL  6 4-8 2     (1) RENAL FUNCTION PANEL 56DOD9972 09:53AM Miki Maxwell    Order Number: [id number]     Test Name Result Flag Reference   ANION GAP (CALC) 5 mmol/L  4-13   BLOOD UREA NITROGEN 15 mg/dL  5-25   CALCIUM 8 8 mg/dL  8 3-10 1   CHLORIDE 107 mmol/L  100-108   CARBON DIOXIDE 29 mmol/L  21-32   CREATININE 0 54 mg/dL L 0 60-1 30   Standardized to IDMS reference method   GLUCOSE,RANDM 80 mg/dL     If the patient is fasting, the ADA then defines impaired fasting glucose as > 100 mg/dL and diabetes as > or equal to 123 mg/dL  POTASSIUM 4 1 mmol/L  3 5-5 3   eGFR Non-African American      >60 0 ml/min/1 73sq m   [facility] Disease Education Program recommendations are as follows:  GFR calculation is accurate only with a steady state creatinine  Chronic Kidney disease less than 60 ml/min/1 73 sq  meters  Kidney failure less than 15 ml/min/1 73 sq  meters  SODIUM 141 mmol/L  136-145   PHOSPHORUS 2 6 mg/dL L 2 7-4 5     (1) VITAMIN D 25-HYDROXY [id number] 09:53AM John Luna    Order Number: [id number]     Test Name Result Flag Reference   VIT D 25-HYDROX 82 7 ng/mL  30 0-100 0   This assay is a certified procedure of the CDC Vitamin D Standardization Certification Program (VDSCP)     Deficiency <20ng/ml   Insufficiency 20-30ng/ml   Sufficient  ng/ml     *Patients undergoing fluorescein dye angiography may retain small amounts of fluorescein in the body for 48-72 hours post procedure  Samples containing fluorescein can produce falsely elevated Vitamin D values  If the patient had this procedure, a specimen should be resubmitted post fluorescein clearance         Future Appointments    Date/Time Provider Specialty Site   06/20/2017 11:15 AM Brinda Morse Endocrinology [facility] END[facility]   09/12/2017 10:20 AM Errol Sauceda DO [facility]     Signatures   Electronically signed by : Stoney Davis DO; Jun 2 2017  5:04PM EST                       (Author)

## 2018-01-11 NOTE — MISCELLANEOUS
Provider Comments  Provider Comments:   Pt  was a no show for her Rheumatology re-evaluation today, 9/12/17  This is the second occurrence        Signatures   Electronically signed by : Sugey Mcgowan DO; Sep 12 2017 10:53AM EST                       (Author)

## 2018-01-11 NOTE — RESULT NOTES
Message   Calcium labs and parathyroid hormone are no normal  Thyroid functions is normal  She still has anemia and abnormal liver function test  Please do MRI  Please follow the primary care doctor and gastroenterologist  she has low proteins which could be from liver dysfunction  Vitamin D is normal on 2000 units a day  Please continue the same dose       Verified Results  (1) T4, FREE 29Apr2016 09:00AM Trinh Denisecristian    Order Number: [id number]    TW Order Number: [id number] Order Number: [id number] Order Number: [id number] Order Number: [id number] Order Number: [id number] Order Number: [id number]     Test Name Result Flag Reference   T4,FREE 0 85 ng/dL  0 76-1 46     (1) TSH 29Apr2016 09:00AM Trinh Denisecristian   TW Order Number: [id number]    TW Order Number: [id number] Order Number: [id number] Order Number: [id number] Order Number: [id number] Order Number: [id number] Order Number: [id number]        The recommended reference ranges for TSH during pregnancy are as follows:  First trimester 0 1 to 2 5 uIU/mL  Second trimester  0 2 to 3 0 uIU/mL  Third trimester 0 3 to 3 0 uIU/m     Test Name Result Flag Reference   TSH 0 973 uIU/mL  0 358-3 740     (1) HEPATIC FUNCTION PANEL 29Apr2016 09:00AM Trinh Flores    Order Number: [id number]    TW Order Number: [id number] Order Number: [id number] Order Number: [id number] Order Number: [id number] Order Number: [id number] Order Number: [id number]     Test Name Result Flag Reference   ALBUMIN 3 1 g/dL L 3 5-5 0   ALK PHOSPHATAS 205 U/L H    ALT (SGPT) 16 U/L  12-78   AST(SGOT) 15 U/L  5-45   BILI, DIRECT 0 08 mg/dL  0 00-0 20   BILI, TOTAL 0 20 mg/dL  0 20-1 00   TOTAL PROTEIN 6 3 g/dL L 6 4-8 2     (1) CBC/PLT/DIFF 29Apr2016 09:00AM Trinh Mark    Order Number: [id number]    TW Order Number: [id number]     Test Name Result Flag Reference   WBC COUNT 5 06 Thousand/uL  4 31-10 16   RBC COUNT 3 73 Million/uL L 3  81-5 12   HEMOGLOBIN 10 1 g/dL L 11 5-15 4   HEMATOCRIT 32 9 % L 34 8-46  1   MCV 88 fL  82-98   MCH 27 1 pg  26 8-34 3   MCHC 30 7 g/dL L 31 4-37 4   RDW 14 1 %  11 6-15 1   MPV 9 5 fL  8 9-12 7   PLATELET COUNT 246 Thousands/uL  149-390   nRBC AUTOMATED 0 /100 WBCs     NEUTROPHILS - REL 62 %  43-75   LYMPHOCYTES - REL 31 %  14-44   MONOCYTES - REL 5 %  4-12   EOSINOPHILS - REL 0 %  0-6   BASOPHILS - REL 2 % H 0-1   NEUTROPHILS ABS 3 14 Thousand/uL  1 85-7 62   LYMPHOTCYTES ABS 1 57 Thousand/uL  0 60-4 47   MONOCYTES ABS 0 25 Thousand/uL  0 00-1 22   EOSINOPHILS ABS 0 00 Thousand/uL  0 00-0 40   BASOPHILS ABS 0 10 Thousand/uL  0 00-0 10   TOTAL COUNTED      RBC MORPHOLOGY Normal     PLT ESTIMATE Adequate  Adequate     (1) MAGNESIUM 29Apr2016 09:00AM Selinda Peppers    Order Number: [id number]     Order Number: [id number] Order Number: [id number] Order Number: [id number] Order Number: [id number] Order Number: [id number] Order Number: [id number]     Test Name Result Flag Reference   MAGNESIUM 2 1 mg/dL  1 6-2 6     (1) PTH N-TERMINAL (INTACT) 29Apr2016 09:00AM Selinda Peppers    Order Number: [id number]    TW Order Number: [id number]     Test Name Result Flag Reference   PARATHYROID HORMONE INTACT 70 2 pg/mL  14 0-72 0     (1) RENAL FUNCTION PANEL 29Apr2016 09:00AM Selinda Peppers    Order Number: [id number]     Order Number: [id number] Order Number: [id number] Order Number: [id number] Order Number: [id number] Order Number: [id number] Order Number: [id number]  National Kidney Disease Education Program recommendations are as follows:  GFR calculation is accurate only with a steady state creatinine  Chronic Kidney disease less than 60 ml/min/1 73 sq  meters  Kidney failure less than 15 ml/min/1 73 sq  meters       Test Name Result Flag Reference   ANION GAP (CALC) 4 mmol/L  4-13   BLOOD UREA NITROGEN 18 mg/dL  5-25   CALCIUM 8 4 mg/dL  8 3-10 1   CHLORIDE 105 mmol/L  100-108 CARBON DIOXIDE 30 mmol/L  21-32   CREATININE 0 66 mg/dL  0 60-1 30   Standardized to IDMS reference method   GLUCOSE,RANDM 94 mg/dL     If the patient is fasting, the ADA then defines impaired fasting glucose as > 100 mg/dL and diabetes as > or equal to 123 mg/dL     POTASSIUM 4 4 mmol/L  3 5-5 3   eGFR Non-African American      >60 0 ml/min/1 73sq m   SODIUM 139 mmol/L  136-145   PHOSPHORUS 3 2 mg/dL  2 7-4 5     (1) VITAMIN D 25-HYDROXY 29Apr2016 09:00AM Sharmaine Hamman   TW Order Number: [id number]    TW Order Number: [id number]     Test Name Result Flag Reference   VIT D 25-HYDROX 48 8 ng/mL  30 0-100 0

## 2018-01-12 NOTE — PROGRESS NOTES
Assessment    1  Rheumatoid arthritis of multiple sites with negative rheumatoid factor (714 0) (M06 09)   2  Osteoporosis (733 00) (M81 0)   3  Vitamin D deficiency (268 9) (E55 9)   4  Elevated alkaline phosphatase level (790 5) (R74 8)   5  Long-term use of hydroxychloroquine (V58 69) (Z79 899)   6  Encounter for monitoring leflunomide therapy (V58 83,V58 69) (Z51 81,Z79 899)   7  Hyperparathyroidism (252 00) (E21 3)   8  Seizure disorder (345 90) (G40 909)    Plan    1  (1) CBC/PLT/DIFF; Status:Active; Requested CCQ:75QUQ9233;    2  (1) COMPREHENSIVE METABOLIC PANEL; Status:Active; Requested for:88Qcf7395;    3  (1) C-REACTIVE PROTEIN; Status:Active; Requested YQE:25PQA0740;    4  (1) SED RATE; Status:Active; Requested JYR:98QNK0304;    5  Follow-up visit in 3 months Evaluation and Treatment  Follow-up  Status: Complete    Done: 65TND1948    7  Call [phone] if: The pain seems worse ; Status:Complete;   Done: 33OEZ0435   7  Call [phone] if: The symptoms seem worse ; Status:Complete;   Done:   74SNQ8256   8  Call [phone] if: You have questions or concerns about your problem ;   Status:Complete;   Done: 90NGV6590    3  Leflunomide 20 MG Oral Tablet; TAKE 1 TABLET DAILY    10  Hydroxychloroquine Sulfate 200 MG Oral Tablet; Take 1 tablet daily    Discussion/Summary    Ms Radha Rothman has been feeling generally well since her last evaluation  She continues to have some mild discomfort in her left wrist and hand, usually when working  She states that her pain is intermittent and seems to worsen with overactivity  She will periodically take Advil as needed for her discomfort with some relief  She continues to have some mild right ankle pain as well, but it has improved  She denies any other obvious joint swelling  She denies any morning stiffness or difficulty sleeping because of pain  She denies any nonrestorative sleep, but she does report occasional fatigue   On exam, there is still swelling noted of the left wrist with some decreased range of motion  Her left wrist is not tender to palpation  She does have some mild swelling of the right ankle as well, but this is also nontender  Review of laboratory studies revealed a CBC with a mild anemia which is essentially unchanged  A CMP did show an elevated alkaline phosphatase which was essentially unchanged  The remainder of the LFTs were within normal limits  ESR was within normal limits, but CRP was elevated at 18 1, which was improved from prior levels  PTH was also elevated, but calcium was normal  At this time, her seronegative rheumatoid arthritis does appear mildly active but somewhat improved since her last evaluation  Since she is still having ongoing issues with an elevated alkaline phosphatase and PTH, I will not make any changes to her medication regimen at this time  I will plan to recheck a CBC, CMP, ESR, and CRP before her follow-up  I will reevaluate her in 3 months  She will call in the interim if there are any questions or concerns  Counseling  Rheumatology Counseling Documentation: The patient was counseled regarding diagnostic results, instructions for management and impressions  Chief Complaint  F/U RA   Patient is here today for follow up of chronic conditions described in HPI  History of Present Illness  Pt  returns for F/U for RA  Feeling well since last visit  Still with some discomfort in L wrist and hand  Pain is intermittent and worsens with overactivity  Taking Advil for pain with some relief  Still with R ankle pain as well, which is intermittent as well  No obvious joint swelling  No AM stiffness  No difficulty sleeping due to pain  No non-restorative sleep  + occasional fatigue  RAPID3: 2 3/30      Review of Systems    Constitutional: recent 3 lb weight loss, but no fever, no recent weight gain, no chills and no anorexia    The patient presents with complaints of occasional episodes of fatigue     HEENT: dryness mouth and feeling congested, but no blurred vision, no double vision, no amaurosis fugax, no dryness of the eyes, no eye pain, no erythema eye(s), no mouth sores and no sore throat  Cardiovascular: no chest pain or pressure, no dyspnea on exertion and no swelling in the arms or legs  Respiratory: no unusual or persistent cough, no shortness of breath and no pleurisy  Gastrointestinal: no abdominal pain, no nausea, no vomiting, no heartburn, no constipation, no melena and no BRBPR    The patient presents with complaints of diarrhea starting about 1 week ago Symptoms are resolved (2/2 GI virus)  Genitourinary: no foamy urine, but no dysuria and no hematuria  Musculoskeletal: as noted in HPI  Integumentary alopecia, but no rash, no Raynaud's, no nail changes and no photosensitivity  Endocrine no polyuria and no polydipsia  Hematologic/Lymphatic: no unusual bleeding    The patient presents with complaints of a tendency for easy bruising  Symptoms are unchanged  Neurological: headache, but no vertigo or dizziness, no tingling and no weakness  Active Problems    1  Abnormal liver ultrasound (793 3) (R93 2)   2  Abnormal SPEP (790 99) (R77 8)   3  Anemia, deficiency (281 9) (D53 9)   4  Elevated alkaline phosphatase level (790 5) (R74 8)   5  Encounter for monitoring leflunomide therapy (V58 83,V58 69) (Z51 81,Z79 899)   6  History of anemia (V12 3) (Z86 2)   7  Hypercalcemia (275 42) (E83 52)   8  Hyperparathyroidism (252 00) (E21 3)   9  Long-term use of hydroxychloroquine (V58 69) (Z79 899)   10  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)   11  Multiple thyroid nodules (241 1) (E04 2)   12  Osteoporosis (733 00) (M81 0)   13  Parathyroid adenoma (227 1) (D35 1)   14  Rheumatoid arthritis of multiple sites with negative rheumatoid factor (714 0) (M06 09)   15  Rotator cuff tendinitis, right (726 10) (M75 81)   16  Seizure disorder (345 90) (G40 909)   17  Shoulder pain (719 41) (M25 519)   18  Swelling of joint of left wrist (719 03) (M25 432)   19  Thyroid nodule (241 0) (E04 1)   20  Vitamin D deficiency (268 9) (E55 9)    Past Medical History    1  History of CRP elevated (790 95) (R79 82)   2  History of anemia (V12 3) (Z86 2)   3  History of arthritis (V13 4) (Z87 39)   4  History of cataract (V12 49) (Z86 69)   5  History of thyroid disease (V12 29) (Z86 39)   6  History of Left wrist pain (719 43) (M25 532)   7  Seizure disorder (345 90) (G40 909)   8  History of Seronegative rheumatoid arthritis (714 0) (M06 00)    The active problems and past medical history were reviewed and updated today  Surgical History    1  History of Arthrodesis Thumb Carpometacarpal Joint   2  History of Bunion Correction With Metatarsal Osteotomy   3  History of Cataract Surgery   4  History of Parathyroid Resection   5  History of Tubal Ligation   6  History of Wrist Surgery    The surgical history was reviewed and updated today  Family History  Mother    1  Family history of arthritis (V17 7) (Z82 61)  Daughter    2  Family history of malignant neoplasm of thyroid (V16 8) (Z80 8)  Maternal Grandmother    3  Family history of rheumatoid arthritis (V17 7) (Z82 61)  Family History    4  Family history of Diabetes (250 00) (E11 9)   5  Denied: Family history of psoriasis   6  Family history of systemic lupus erythematosus (V19 4) (Z82 69)   7  Denied: Family history of ulcerative colitis    The family history was reviewed and updated today  Social History    · Current smoker (305 1) (F17 200)   · Drinks coffee   · No drug use   · Occasional alcohol use  The social history was reviewed and updated today  The social history was reviewed and is unchanged  Current Meds   1  Advil 200 MG Oral Tablet; TAKE 2 TABLET 3 times daily PRN pain; Therapy: (Recorded:06Fef2353) to Recorded   2  Hydroxychloroquine Sulfate 200 MG Oral Tablet; Take 1 tablet daily; Therapy: (Recorded:65Ffd7074) to Recorded   3  Leflunomide 20 MG Oral Tablet; TAKE 1 TABLET DAILY; Therapy: 79JIO6589 to (Evaluate:00Gio7308)  Requested for: 48Dsj0927; Last   Rx:91Kvu7050 Ordered   4  Phenytoin Sodium 100 MG CAPS; TAKE 1 CAPSULE 3 TIMES DAILY; Therapy: (Recorded:12Arr2053) to Recorded   5  Vitamin D 2000 UNIT Oral Tablet; Take 1 tablet daily; Therapy: 30BXT5445 to (Evaluate:74Pyh8723) Recorded    The medication list was reviewed and updated today  Allergies    1  Penicillins    2  Pollen    Vitals  Signs   Recorded: 39ITR5026 42:77TM   Systolic: 90  Diastolic: 62  Heart Rate: 66  Weight: 79 lb   BMI Calculated: 14 93  BSA Calculated: 1 27    Physical Exam    Constitutional   General appearance: Abnormal   chronically ill and underweight  Eyes   Conjunctiva and lids: No swelling, erythema or discharge  Pupils and irises: Equal, round and reactive to light  Ears, Nose, Mouth, and Throat   External inspection of ears and nose: Normal     Oropharynx: Abnormal   (+ poor dentition)   Pulmonary   Respiratory effort: No increased work of breathing or signs of respiratory distress  Auscultation of lungs: Abnormal   Auscultation of the lungs revealed decreased breath sounds diffusely  Cardiovascular   Auscultation of heart: Normal rate and rhythm, normal S1 and S2, without murmurs  Examination of extremities for edema and/or varicosities: Normal     Lymphatic   Palpation of lymph nodes in neck: No lymphadenopathy  Psychiatric   Orientation to person, place, and time: Normal     Mood and affect: Normal         Left wrist swelling and restricted ROM  Right ankle tenderness  Musculoskeletal - Joints, bones, and muscles: Abnormal  Palpation - bilateral knee crepitus     Skin - Skin and subcutaneous tissue: Normal    Neurologic - Sensation: Normal       Results/Data  (1) PTH N-TERMINAL (INTACT) 38Orq6087 11:39AM [facility] [place] Order Number: [id number]     Test Name Result Flag Reference   PARATHYROID HORMONE INTACT 76 7 pg/mL H 14 0-72 0     (1) CBC/PLT/DIFF 75Weh9531 11:39AM [facility] Order Number: [id number]     Test Name Result Flag Reference   WBC COUNT 5 78 Thousand/uL  4 31-10 16   RBC COUNT 3 64 Million/uL L 3 81-5 12   HEMOGLOBIN 10 1 g/dL L 11 5-15 4   HEMATOCRIT 31 7 % L 34 8-46  1   MCV 87 fL  82-98   MCH 27 7 pg  26 8-34 3   MCHC 31 9 g/dL  31 4-37 4   RDW 13 8 %  11 6-15 1   MPV 9 1 fL  8 9-12 7   PLATELET COUNT 527 Thousands/uL  149-390   nRBC AUTOMATED 0 /100 WBCs     NEUTROPHILS RELATIVE PERCENT 56 %  43-75   LYMPHOCYTES RELATIVE PERCENT 31 %  14-44   MONOCYTES RELATIVE PERCENT 11 %  4-12   EOSINOPHILS RELATIVE PERCENT 1 %  0-6   BASOPHILS RELATIVE PERCENT 1 %  0-1   NEUTROPHILS ABSOLUTE COUNT 3 23 Thousands/?L  1 85-7 62   LYMPHOCYTES ABSOLUTE COUNT 1 80 Thousands/?L  0 60-4 47   MONOCYTES ABSOLUTE COUNT 0 61 Thousand/?L  0 17-1 22   EOSINOPHILS ABSOLUTE COUNT 0 08 Thousand/?L  0 00-0 61   BASOPHILS ABSOLUTE COUNT 0 05 Thousands/?L  0 00-0 10   - Patient Instructions: This bloodwork is non-fasting  Please drink two glasses of water morning of bloodwork  - Patient Instructions: This bloodwork is non-fasting  Please drink two glasses of water morning of bloodwork  (1) COMPREHENSIVE METABOLIC PANEL 29QGM7281 53:24JS [facility] Order Number: [id number]     Test Name Result Flag Reference   GLUCOSE,RANDM 79 mg/dL     If the patient is fasting, the ADA then defines impaired fasting glucose as > 100 mg/dL and diabetes as > or equal to 123 mg/dL     SODIUM 139 mmol/L  136-145   POTASSIUM 4 3 mmol/L  3 5-5 3   CHLORIDE 105 mmol/L  100-108   CARBON DIOXIDE 27 mmol/L  21-32   ANION GAP (CALC) 7 mmol/L  4-13   BLOOD UREA NITROGEN 18 mg/dL  5-25   CREATININE 0 63 mg/dL  0 60-1 30   Standardized to IDMS reference method   CALCIUM 8 8 mg/dL  8 3-10 1   BILI, TOTAL 0 16 mg/dL L 0 20-1 00   ALK PHOSPHATAS 219 U/L H    ALT (SGPT) 15 U/L  12-78   AST(SGOT) 14 U/L 5-45   ALBUMIN 3 0 g/dL L 3 5-5 0   TOTAL PROTEIN 6 9 g/dL  6 4-8 2   eGFR Non-African American      >60 0 ml/min/1 73sq [facility] Disease Education Program recommendations are as follows:  GFR calculation is accurate only with a steady state creatinine  Chronic Kidney disease less than 60 ml/min/1 73 sq  meters  Kidney failure less than 15 ml/min/1 73 sq  meters  (1) C-REACTIVE PROTEIN 84Tsp9324 11:39AM Padmini Jaimes   TW Order Number: [id number]     Test Name Result Flag Reference   C-REACT PROTEIN 18 3 mg/L H <3 0     (1) SED RATE 21UMO5242 11:39AM Padmini Jaimes   TW Order Number: [id number]     Test Name Result Flag Reference   SED RATE 27 mm/hour H 0-20       Future Appointments    Date/Time Provider Specialty Site   10/28/2016 01:10 PM PEPE Miller   Endocrinology [facility]   12/23/2016 08:20 AM Padmini Jaimes DO Rheumatology [facility] [address] [facility]     Signatures   Electronically signed by : Christy Singer DO; Sep 23 2016 12:09PM EST                       (Author)

## 2018-01-12 NOTE — RESULT NOTES
Message   Discuss at visit  Verified Results  (1) RENAL FUNCTION PANEL [id number] 09:45AM [facility] [place] Order Number: [id number]     Test Name Result Flag Reference   ANION GAP (CALC) 6 mmol/L  4-13   ALBUMIN 3 1 g/dL L 3 5-5 0   BLOOD UREA NITROGEN 13 mg/dL  5-25   CALCIUM 9 0 mg/dL  8 3-10 1   CHLORIDE 103 mmol/L  100-108   CARBON DIOXIDE 29 mmol/L  21-32   CREATININE 0 62 mg/dL  0 60-1 30   Standardized to IDMS reference method   GLUCOSE,RANDM 81 mg/dL     POTASSIUM 3 9 mmol/L  3 5-5 3   eGFR Non-African American      >60 0 ml/min/1 73sq [facility] Disease Education Program recommendations are as follows:  GFR calculation is accurate only with a steady state creatinine  Chronic Kidney disease less than 60 ml/min/1 73 sq  meters  Kidney failure less than 15 ml/min/1 73 sq  meters  SODIUM 138 mmol/L  136-145   PHOSPHORUS 3 0 mg/dL  2 7-4 5     (1) PTH N-TERMINAL (INTACT) [id number] 09:45AM [facility] [place] Order Number: [id number]     Test Name Result Flag Reference   PARATHYROID HORMONE INTACT 53 6 pg/mL  14 0-72 0     (1) VITAMIN D 25-HYDROXY 21GWT0831 09:45AM [facility] [place] Order Number: [id number]     Test Name Result Flag Reference   VIT D 25-HYDROX 50 6 ng/mL  30 0-100 0   This assay is a certified procedure of the CDC Vitamin D Standardization Certification Program (VDSCP)     Deficiency <20ng/ml   Insufficiency 20-30ng/ml   Sufficient  ng/ml     *Patients undergoing fluorescein dye angiography may retain small amounts of fluorescein in the body for 48-72 hours post procedure  Samples containing fluorescein can produce falsely elevated Vitamin D values  If the patient had this procedure, a specimen should be resubmitted post fluorescein clearance

## 2018-01-12 NOTE — MISCELLANEOUS
Message   Recorded as Task   Date: 01/29/2016 12:24 PM, Created By: Anaid Newman   Task Name: Miscellaneous   Assigned To: Tj Chandler   Regarding Patient: Felecia Sanchez, Status: Active   CommentVerma Marlo - 29 Jan 2016 12:24 PM     TASK CREATED  can you help me understand the results of this SPEP in Dec 2015 and if she needs to see you? She has primary hyperparathyroidism  Thanks   Edwige Wallace) - 02 Feb 2016 8:23 AM     TASK REPLIED TO: Previously Assigned To Angela Padron  Good morning jimenaik  Serum protein electrophoresis showed elevation in alpha-1, alpha-2 components, no evidence of monoclonal proteins, this is most likely secondary to rheumatology issue  I suggest free light chain to be done on next office visit   will share with Dr Ada Winkler and request her to follow on SPEP as she would deem appropriate   Thanks      Signatures   Electronically signed by : PEPE Aguilar ; Feb  3 2016 10:54AM EST                       (Author)

## 2018-01-13 VITALS
BODY MASS INDEX: 17.18 KG/M2 | HEART RATE: 76 BPM | WEIGHT: 91 LBS | SYSTOLIC BLOOD PRESSURE: 100 MMHG | HEIGHT: 61 IN | DIASTOLIC BLOOD PRESSURE: 62 MMHG

## 2018-01-13 NOTE — PROGRESS NOTES
Assessment    1  Rheumatoid arthritis of multiple sites with negative rheumatoid factor (714 0) (M06 09)   2  Long-term use of hydroxychloroquine (V58 69) (Z79 899)   3  Encounter for monitoring leflunomide therapy (V58 83,V58 69) (Z51 81,Z79 899)   4  Osteoporosis (733 00) (M81 0)   5  Hyperparathyroidism (252 00) (E21 3)   6  Vitamin D deficiency (268 9) (E55 9)    Plan    1  Hydroxychloroquine Sulfate 200 MG Oral Tablet; TAKE 1 and 1/2 TABLETS Daily   With Food    2  (1) CBC/PLT/DIFF; Status:Active; Requested for:04Lkv4269;    3  (1) COMPREHENSIVE METABOLIC PANEL; Status:Active; Requested for:21Lkw5390;    4  (1) C-REACTIVE PROTEIN; Status:Active; Requested for:50Ziz0582;    5  (1) SED RATE; Status:Active; Requested for:04Nen6636;    6  Follow-up visit in 2 months Evaluation and Treatment  Follow-up  Status: Complete    Done: 99FBY1760    7  Call [phone] if: The pain seems worse ; Status:Complete;   Done: 02XBT5984   8  Call [phone] if: The symptoms seem worse ; Status:Complete;   Done:   55RQI4618    9  Leflunomide 20 MG Oral Tablet; TAKE 1 TABLET DAILY    Discussion/Summary    Ms Mica Chua continues to have some left wrist pain, which does worsen with work activities  She typically will wear a brace when she is working and take some Advil with good relief  She also complains of pain in her left great toe at the base of the toe  She states it feels sharp and stabbing in quality and usually occurs at rest  The pain usually lasts for several minutes before resolution  She reports swelling in the left wrist and the left first toe  She denies any other obvious joint swelling  She denies any morning stiffness or difficulty sleeping because of pain  She does report rare nonrestorative sleep and fatigue which is relatively unchanged  On exam, there is synovitis of the left wrist, as well as the left first MTP  There is also tenderness to palpation of the right ankle, with minimal swelling present   No new rheumatologic labs were available for review today  She does report that she is undergoing evaluation by Dr Gisela Mccoy for persistent elevated liver tests  No recent hepatic function profile was available for review today  At this time, her rheumatoid arthritis does appear mildly active despite leflunomide 20 mg daily and Plaquenil 200 mg daily  In light of her elevated liver tests, I will remain conservative route regards to treatment at this time  Therefore, I will increase the Plaquenil to 300 mg daily to see if this will give her some additional efficacy  I will check some updated laboratory studies including a CBC, CMP, ESR, and CRP before her follow-up  If she does not have an adequate response to the increased dose of leflunomide, we may need to consider adding an additional DMARD or possibly even a biologic medication  I will reevaluate her in 2 months  She will call in the interim if there are any questions or concerns  Counseling  Rheumatology Counseling Documentation: The patient was counseled regarding diagnostic results, instructions for management, impressions and risks and benefits of treatment options  Chief Complaint  F/U RA   Patient is here today for follow up of chronic conditions described in HPI  History of Present Illness  Pt  returns for F/U for RA  Feeling OK since last visit  Still with L wrist pain, which is worsened with work activities  Wears wrist brace at work and takes Advil with some relief  Also with some pain in L great toe -> feels sharp and stabbing in quality  Pain lasts for a few minutes before resolving  Pain in foot usually occurs when she is sitting  + swelling in L wrist and L 1st toe  No other obvious joint swelling  No AM stiffness  No difficulty sleeping due to pain  + rare non-restorative sleep  + fatigue -> unchanged       RAPID3: 2 8/30      Review of Systems    Constitutional: recent 2 lb weight gain, but no fever, no chills, no recent weight loss and no anorexia    The patient presents with complaints of fatigue  Symptoms are unchanged  HEENT: dryness mouth and feeling congested, but no blurred vision, no double vision, no amaurosis fugax, no dryness of the eyes, no eye pain, no erythema eye(s), no mouth sores and no sore throat  Cardiovascular: no chest pain or pressure, no dyspnea on exertion and no swelling in the arms or legs  Respiratory: no unusual or persistent cough, no shortness of breath and no pleurisy  Gastrointestinal: no abdominal pain, no nausea, no vomiting, no heartburn, no diarrhea, no constipation, no melena and no BRBPR  Genitourinary: no foamy urine and increased frequency, but no dysuria and no hematuria  Musculoskeletal: as noted in HPI  Integumentary alopecia, but no rash, no Raynaud's, no nail changes and no photosensitivity  Endocrine no polyuria and no polydipsia  Hematologic/Lymphatic: no unusual bleeding    The patient presents with complaints of a tendency for easy bruising  Symptoms are unchanged  Neurological: headache, but no vertigo or dizziness, no tingling and no weakness  Psychiatric: non-restorative sleep, but no insomnia  Active Problems    1  Abnormal liver ultrasound (793 3) (R93 2)   2  Abnormal SPEP (790 99) (R77 8)   3  Anemia, deficiency (281 9) (D53 9)   4  CRP elevated (790 95) (R79 82)   5  Elevated alkaline phosphatase level (790 5) (R74 8)   6  Encounter for monitoring leflunomide therapy (V58 83,V58 69) (Z51 81,Z79 899)   7  History of anemia (V12 3) (Z86 2)   8  Hypercalcemia (275 42) (E83 52)   9  Hyperparathyroidism (252 00) (E21 3)   10  Long-term use of hydroxychloroquine (V58 69) (Z79 899)   11  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)   12  Multiple thyroid nodules (241 1) (E04 2)   13  Osteoporosis (733 00) (M81 0)   14  Parathyroid adenoma (227 1) (D35 1)   15  Rheumatoid arthritis of multiple sites with negative rheumatoid factor (714 0) (M06 09)   16   Rotator cuff tendinitis, right (726 10) (M75 81)   17  Seizure disorder (345 90) (G40 909)   18  Shoulder pain (719 41) (M25 519)   19  Swelling of joint of left wrist (719 03) (M25 432)   20  Thyroid nodule (241 0) (E04 1)   21  Vitamin D deficiency (268 9) (E55 9)    Past Medical History    1  History of anemia (V12 3) (Z86 2)   2  History of arthritis (V13 4) (Z87 39)   3  History of cataract (V12 49) (Z86 69)   4  History of thyroid disease (V12 29) (Z86 39)   5  History of Left wrist pain (719 43) (M25 532)   6  Seizure disorder (345 90) (G40 909)   7  History of Seronegative rheumatoid arthritis (714 0) (M06 00)    The active problems and past medical history were reviewed and updated today  Surgical History    1  History of Arthrodesis Thumb Carpometacarpal Joint   2  History of Bunion Correction With Metatarsal Osteotomy   3  History of Cataract Surgery   4  History of Parathyroid Resection   5  History of Tubal Ligation   6  History of Wrist Surgery    The surgical history was reviewed and updated today  Family History  Mother    1  Family history of arthritis (V17 7) (Z82 61)  Daughter    2  Family history of malignant neoplasm of thyroid (V16 8) (Z80 8)  Maternal Grandmother    3  Family history of rheumatoid arthritis (V17 7) (Z82 61)  Family History    4  Family history of Diabetes (250 00) (E11 9)   5  Denied: Family history of psoriasis   6  Family history of systemic lupus erythematosus (V19 4) (Z82 69)   7  Denied: Family history of ulcerative colitis    The family history was reviewed and updated today  Social History    · Current smoker (305 1) (F17 200)   · Drinks coffee   · No drug use   · Occasional alcohol use  The social history was reviewed and updated today  The social history was reviewed and is unchanged  Current Meds   1  Advil 200 MG Oral Tablet; TAKE 2 TABLET 3 times daily PRN pain; Therapy: (Recorded:17Xdu0624) to Recorded   2   Hydroxychloroquine Sulfate 200 MG Oral Tablet; take 1 tablet daily with food; Therapy: 06APQ9294 to (Evaluate:39Sxa9378)  Requested for: 03PGT0324; Last   Rx:84Vdn0298 Ordered   3  Leflunomide 20 MG Oral Tablet; TAKE 1 TABLET DAILY; Therapy: 51WSH0658 to (Evaluate:64Nqp1581)  Requested for: 29Apr2016; Last   Rx:01Kkk8468 Ordered   4  Phenytoin Sodium 100 MG CAPS; TAKE 1 CAPSULE 3 TIMES DAILY; Therapy: (Recorded:87Zae8372) to Recorded   5  Vitamin D 2000 UNIT Oral Tablet; Take 1 tablet daily; Therapy: 38GFQ1665 to (Evaluate:68Kxd3346) Recorded    The medication list was reviewed and updated today  Allergies    1  Penicillins    2  Pollen    Vitals  Signs   Recorded: 84VCI1615 09:11WD   Systolic: 88  Diastolic: 56  Heart Rate: 70  Weight: 83 lb 8 oz  BMI Calculated: 15 78  BSA Calculated: 1 3    Physical Exam    Constitutional   General appearance: Abnormal   chronically ill and underweight  Eyes   Conjunctiva and lids: No swelling, erythema or discharge  Pupils and irises: Equal, round and reactive to light  Ears, Nose, Mouth, and Throat   External inspection of ears and nose: Normal     Oropharynx: Abnormal   (+ poor dentition)   Pulmonary   Respiratory effort: No increased work of breathing or signs of respiratory distress  Auscultation of lungs: Abnormal   Auscultation of the lungs revealed decreased breath sounds diffusely  Cardiovascular   Auscultation of heart: Normal rate and rhythm, normal S1 and S2, without murmurs  Examination of extremities for edema and/or varicosities: Normal     Lymphatic   Palpation of lymph nodes in neck: No lymphadenopathy  Psychiatric   Orientation to person, place, and time: Normal     Mood and affect: Normal         Left wrist swelling and restricted ROM  Right ankle swelling  Left great toe MTP tenderness and swelling  Musculoskeletal - Joints, bones, and muscles: Abnormal  Palpation - bilateral knee crepitus     Skin - Skin and subcutaneous tissue: Normal    Neurologic - Sensation: Normal  Results/Data  (1) ALKALINE PHOSPHATASE ISOENZYMES 77Axj2240 09:44AM Beka BARBOZA Order Number: [id number]     Test Name Result Flag Reference   ALK PHOSPHATAS 226 IU/L H 39 - 117   BONE ISOENZYMES 10 % L 14 - 68   INTEST ISOENZYM 0 %  0 - 18   Performed at:  [address] [place] [address]  [id number]  : Tuan Lobo MD, Phone:  [phone]  Performed at:  [address]  [id number]  : Jennifer Barker MD, Phone:  [phone]   LIVER ISOENZ 90 % H 18 - 85     (1) PTH N-TERMINAL (INTACT) 14WQX7503 09:44AM Beka BARBOZA Order Number: [id number]  TW Order Number: [id number]     Test Name Result Flag Reference   PARATHYROID HORMONE INTACT 73 1 pg/mL H 14 0-72 0     (1) RENAL FUNCTION PANEL 31Vtb4819 09:44AM Sumi Dialey Order Number: [id number]  TW Order Number: [id number]     Test Name Result Flag Reference   ANION GAP (CALC) 6 mmol/L  4-13   ALBUMIN 3 1 g/dL L 3 5-5 0   BLOOD UREA NITROGEN 19 mg/dL  5-25   National Kidney Disease Education Program recommendations are as follows:  GFR calculation is accurate only with a steady state creatinine  Chronic Kidney disease less than 60 ml/min/1 73 sq  meters  Kidney failure less than 15 ml/min/1 73 sq  meters     CALCIUM 9 1 mg/dL  8 3-10 1   CHLORIDE 102 mmol/L  100-108   CARBON DIOXIDE 28 mmol/L  21-32   CREATININE 0 62 mg/dL  0 60-1 30   Standardized to IDMS reference method   GLUCOSE,RANDM 87 mg/dL     POTASSIUM 3 9 mmol/L  3 5-5 3   eGFR Non-African American      >60 0 ml/min/1 73sq m   SODIUM 136 mmol/L  136-145   PHOSPHORUS 3 3 mg/dL  2 7-4 5     (1) VITAMIN D 25-HYDROXY 42Mhr7134 09:44AM Beka BARBOZA Order Number: [id number]  TW Order Number: [id number]     Test Name Result Flag Reference   VIT D 25-HYDROX 46 3 ng/mL  30 0-100 0   This assay is a certified procedure of the CDC Vitamin D Standardization Certification Program (VDSCP)     Deficiency <20ng/ml   Insufficiency 20-30ng/ml   Sufficient  ng/ml     *Patients undergoing fluorescein dye angiography may retain small amounts of fluorescein in the body for 48-72 hours post procedure  Samples containing fluorescein can produce falsely elevated Vitamin D values  If the patient had this procedure, a specimen should be resubmitted post fluorescein clearance  (1) FERRITIN [id number] 09:09AM [facility], Provider   Test ordered by: Genette Justice     Test Name Result Flag Reference   FERRITIN 22 ng/mL  8-388     (1) GGT 29LXP1011 09:09AM [facility], Provider   Test ordered by: Genette Justice     Test Name Result Flag Reference   GGT 37 U/L  5-85     (1) IRON [id number] 09:09AM [facility], Provider   Test ordered by: Genette Justice     Test Name Result Flag Reference   IRON 58 ug/dL       (1) TIBC 63MHO4652 09:09AM [facility], Provider   Test ordered by: Genette Justice     Test Name Result Flag Reference   TOTAL IRON BINDING CAPACITY 294 ug/dL  250-450     (1) MITOCHONDRIAL ANTIBODY [id number] 09:09AM [facility], Provider   Test ordered by: Genette Justice     Test Name Result Flag Reference   MITOCHONDR AB 3 6 Units  0 0 - 20 0   Negative    0 0 - 20 0                                Equivocal  20 1 - 24 9                                Positive         >24 9Mitochondrial (M2) Antibodies are found in 90-96% ofpatients with primary biliary cirrhosis  Performed at:  [address] [place] [address]  [id number]  : Contreras Travis MD, Phone:  [phone]     (1) JOSE SCREEN W/REFLEX TO TITER/PATTERN [id number] 09:09AM [facility], Provider   Test ordered by: Genette Justice     Test Name Result Flag Reference   JOSE SCREEN  Negative  Negative       Future Appointments    Date/Time Provider Specialty Site   10/28/2016 01:10 PM PEPE Palma   Endocrinology [facility]   09/23/2016 11:00 AM Maggie Greco DO [facility]umatology [facility]   09/12/2016 09:30 AM Dax Enriquez MD Surgical [facility]     Signatures   Electronically signed by : Kim Jin DO; Jul 29 2016 11:58AM EST                       (Author)

## 2018-01-13 NOTE — RESULT NOTES
Message      Elevated ALP from liver  will order liver ultrasound for abnormal liver test  Pls follow with PCP for test result discussion and next steps       Verified Results  (1) ALKALINE PHOSPHATASE ISOENZYMES [id number] 09:26AM Monie Drain Order Number: [id number]    Performed at:  [facility] [place] [address]  [id number]  : Suresh Dueñas MD, Phone:  [phone]  Performed at:  [address]  [id number]  : Gloria Garcia MD, Phone:  [phone]     Test Name Result Flag Reference   ALK PHOSPHATAS 184 IU/L H 39 - 117   BONE ISOENZYMES 13 % L 14 - 68   INTEST ISOENZYM 0 %  0 - 18   LIVER ISOENZ 87 % H 18 - 85       Plan  Elevated alkaline phosphatase level    · US LIVER; Status:Hold For - Scheduling; Requested for:72Uwh2604;     Signatures   Electronically signed by : PEPE Morrow ; Feb 2 2016 12:45PM EST                       (Author)

## 2018-01-13 NOTE — PROGRESS NOTES
Assessment    1  Rheumatoid arthritis of multiple sites with negative rheumatoid factor (714 0) (M06 09)   2  Osteoporosis (733 00) (M81 0)   3  Vitamin D deficiency (268 9) (E55 9)   4  Elevated alkaline phosphatase level (790 5) (R74 8)   5  Long-term use of hydroxychloroquine (V58 69) (Z79 899)   6  Encounter for monitoring leflunomide therapy (V58 83,V58 69) (Z51 81,Z79 899)   7  Hyperparathyroidism (252 00) (E21 3)   8  Seizure disorder (345 90) (G40 909)    Plan    1  (1) CBC/PLT/DIFF; Status:Active; Requested for:93Sqe1875;    2  (1) COMPREHENSIVE METABOLIC PANEL; Status:Active; Requested for:54Bmv9097;    3  (1) C-REACTIVE PROTEIN; Status:Active; Requested for:09Feb2017;    4  (1) SED RATE; Status:Active; Requested ITM:25TUA0869;    5  Follow-up visit in 2 months Evaluation and Treatment  Follow-up  Status: Complete    Done: 95DRG9879    6  Call [phone] if: The pain seems worse ; Status:Complete;   Done: 65YPT4581   7  Call [phone] if: The symptoms seem worse ; Status:Complete;   Done:   09ZOF9278   8  Call [phone] if: You have questions or concerns about your problem ;   Status:Complete;   Done: 79CLK6734    9  Leflunomide 20 MG Oral Tablet; TAKE 1 TABLET DAILY    10  Hydroxychloroquine Sulfate 200 MG Oral Tablet; Take 1 tablet daily    Discussion/Summary    Ms Josefina Smith has been feeling okay since her last evaluation  She does report that she has sustained a stress fracture in her left foot, and she is currently wearing a cam walker boot  She has been in the boot for 7 weeks, but the fracture is still not completely healed  She denies any significant left foot pain at this time, with the exception of her left first toe  She also complains of discomfort in her right ankle with some associated swelling  She is not currently utilizing any medications for pain at this time  She is also noted swelling in her left first toe  She denies any other obvious joint swelling   She denies any morning stiffness  She does report difficulty sleeping, but she does not attribute this to pain  She also reports nonrestorative sleep and occasional fatigue  She does state that her last eye exam was in July 2015  On exam, there is synovitis, tenderness, and restrictive range of motion of the left wrist  There is also swelling and tenderness to palpation of the right ankle and left first IP joint  Review of laboratory studies revealed a normal CBC and CMP  CMP did show a persistently elevated alkaline phosphatase, but calcium was within normal limits  Albumin was also slightly low at 3 3  CRP was elevated at 15 3, but improved from prior levels  At this time, her rheumatoid arthritis does appear active and uncontrolled despite Plaquenil and leflunomide  We did discuss several treatment options, and since she is undergoing care for her stress fracture at this time, she would like to avoid any additional medications  I will defer the management of her persistently elevated alkaline phosphatase to her endocrinologist, as I am unsure of the full etiology of this  If she does have persistently elevated inflammatory markers and synovitis at her follow-up, we may consider adding a biologic such as Enbrel or Humira  I will reevaluate her in 2 months  She will call in the interim if there are any questions or concerns  Patient is able to Self-Care  Counseling  Rheumatology Counseling Documentation: The patient and patient's family was counseled regarding diagnostic results, instructions for management, impressions and risks and benefits of treatment options  Chief Complaint  F/U RA   Patient is here today for follow up of chronic conditions described in HPI  History of Present Illness  Pt  returns for F/U for RA  Feeling OK since last visit  Has stress fracture in L foot -> currently wearing Cam boot x 7 weeks, but still not completely healed  No significant pain in L foot   c/o pain in R ankle and L 1st toe  No pain meds taken at this time  + swelling in L 1st toe  No other obvious joint swelling  No AM stiffness  + difficulty sleeping -> not due to pain  + non-restorative sleep  + occasional fatigue  Last eye exam in 7/2015  RAPID3: not completed      Review of Systems    Constitutional: recent 4 lb weight gain, but no fever, no chills, no recent weight loss and no anorexia    The patient presents with complaints of occasional episodes of fatigue  HEENT: dryness mouth and feeling congested, but no blurred vision, no double vision, no amaurosis fugax, no dryness of the eyes, no eye pain, no erythema eye(s), no mouth sores and no sore throat  Cardiovascular: no chest pain or pressure, no dyspnea on exertion and no swelling in the arms or legs  Respiratory: no unusual or persistent cough, no shortness of breath and no pleurisy  Gastrointestinal: heartburn, but no abdominal pain, no nausea, no vomiting, no diarrhea, no constipation, no melena and no BRBPR  Genitourinary: no foamy urine, but no dysuria and no hematuria  Musculoskeletal: as noted in HPI  Integumentary alopecia, but no rash, no Raynaud's, no nail changes and no photosensitivity  Endocrine no polyuria and no polydipsia  Hematologic/Lymphatic: no unusual bleeding and no tendency for easy bruising  Neurological: no headache, no vertigo or dizziness, no tingling and no weakness  Psychiatric: insomnia and non-restorative sleep  Active Problems    1  Abnormal liver ultrasound (793 3) (R93 2)   2  Abnormal SPEP (790 99) (R77 8)   3  Anemia, deficiency (281 9) (D53 9)   4  Elevated alkaline phosphatase level (790 5) (R74 8)   5  Encounter for monitoring leflunomide therapy (V58 83,V58 69) (Z51 81,Z79 899)   6  History of anemia (V12 3) (Z86 2)   7  Hypercalcemia (275 42) (E83 52)   8  Hyperparathyroidism (252 00) (E21 3)   9  Long-term use of hydroxychloroquine (V58 69) (Z79 899)   10   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) 11  Multiple thyroid nodules (241 1) (E04 2)   12  Osteoporosis (733 00) (M81 0)   13  Parathyroid adenoma (227 1) (D35 1)   14  Rheumatoid arthritis of multiple sites with negative rheumatoid factor (714 0) (M06 09)   15  Rotator cuff tendinitis, right (726 10) (M75 81)   16  Seizure disorder (345 90) (G40 909)   17  Shoulder pain (719 41) (M25 519)   18  Swelling of joint of left wrist (719 03) (M25 432)   19  Thyroid nodule (241 0) (E04 1)   20  Vitamin D deficiency (268 9) (E55 9)    Past Medical History    1  History of CRP elevated (790 95) (R79 82)   2  History of anemia (V12 3) (Z86 2)   3  History of arthritis (V13 4) (Z87 39)   4  History of cataract (V12 49) (Z86 69)   5  History of thyroid disease (V12 29) (Z86 39)   6  History of Left wrist pain (719 43) (M25 532)   7  Seizure disorder (345 90) (G40 909)   8  History of Seronegative rheumatoid arthritis (714 0) (M06 00)    The active problems and past medical history were reviewed and updated today  Surgical History    1  History of Arthrodesis Thumb Carpometacarpal Joint   2  History of Bunion Correction With Metatarsal Osteotomy   3  History of Cataract Surgery   4  History of Parathyroid Resection   5  History of Tubal Ligation   6  History of Wrist Surgery    The surgical history was reviewed and updated today  Family History  Mother    1  Family history of arthritis (V17 7) (Z82 61)  Daughter    2  Family history of malignant neoplasm of thyroid (V16 8) (Z80 8)  Maternal Grandmother    3  Family history of rheumatoid arthritis (V17 7) (Z82 61)  Family History    4  Family history of Diabetes (250 00) (E11 9)   5  Denied: Family history of psoriasis   6  Family history of systemic lupus erythematosus (V19 4) (Z82 69)   7  Denied: Family history of ulcerative colitis    The family history was reviewed and updated today         Social History    · Current smoker (305 1) (F17 200)   · Drinks coffee   · No drug use   · Occasional alcohol use  The social history was reviewed and updated today  The social history was reviewed and is unchanged  Current Meds   1  Advil 200 MG Oral Tablet; TAKE 2 TABLET 3 times daily PRN pain; Therapy: (Recorded:98Kft7174) to Recorded   2  Hydroxychloroquine Sulfate 200 MG Oral Tablet; Take 1 tablet daily; Therapy: (Recorded:39Nwc5932) to Recorded   3  Leflunomide 20 MG Oral Tablet; TAKE 1 TABLET DAILY; Therapy: 44SAQ6066 to (Evaluate:81Zxa0857)  Requested for: 58MCB6825; Last   Rx:61Epu1375 Ordered   4  Phenytoin Sodium 100 MG CAPS; TAKE 1 CAPSULE 3 TIMES DAILY; Therapy: (Recorded:09Feb2016) to Recorded   5  Vitamin D 2000 UNIT Oral Tablet; Take 1 tablet daily; Therapy: 92WSC6044 to (Evaluate:03Tcj7289) Recorded    The medication list was reviewed and updated today  Allergies    1  Penicillins    2  Pollen    Vitals  Signs   Recorded: 23Dec2016 08:22AM   Heart Rate: 60  Systolic: 046  Diastolic: 60  Weight: 83 lb   BMI Calculated: 15 68  BSA Calculated: 1 3    Physical Exam    Constitutional   General appearance: Abnormal   chronically ill and underweight  Eyes   Conjunctiva and lids: No swelling, erythema or discharge  Pupils and irises: Equal, round and reactive to light  Ears, Nose, Mouth, and Throat   External inspection of ears and nose: Normal     Oropharynx: Abnormal   (+ poor dentition)   Pulmonary   Respiratory effort: No increased work of breathing or signs of respiratory distress  Auscultation of lungs: Abnormal   Auscultation of the lungs revealed decreased breath sounds diffusely  Cardiovascular   Auscultation of heart: Normal rate and rhythm, normal S1 and S2, without murmurs  Examination of extremities for edema and/or varicosities: Normal     Lymphatic   Palpation of lymph nodes in neck: No lymphadenopathy  Psychiatric   Orientation to person, place, and time: Normal     Mood and affect: Normal         Left wrist tenderness, swelling and restricted ROM     Right ankle tenderness and swelling  Left great toe IP tenderness, swelling and restricted ROM  Musculoskeletal - Joints, bones, and muscles: Abnormal  Palpation - bilateral knee crepitus  Skin - Skin and subcutaneous tissue: Normal    Neurologic - Sensation: Normal       Results/Data  (1) CBC/PLT/DIFF 05IKJ8752 11:12AM Jessica Couch    Order Number: [id number]     Test Name Result Flag Reference   WBC COUNT 5 07 Thousand/uL  4 31-10 16   RBC COUNT 4 32 Million/uL  3 81-5 12   HEMOGLOBIN 11 8 g/dL  11 5-15 4   HEMATOCRIT 37 6 %  34 8-46  1   MCV 87 fL  82-98   MCH 27 3 pg  26 8-34 3   MCHC 31 4 g/dL  31 4-37 4   RDW 13 6 %  11 6-15 1   MPV 9 4 fL  8 9-12 7   PLATELET COUNT 379 Thousands/uL  149-390   nRBC AUTOMATED 0 /100 WBCs     NEUTROPHILS RELATIVE PERCENT 57 %  43-75   LYMPHOCYTES RELATIVE PERCENT 29 %  14-44   MONOCYTES RELATIVE PERCENT 12 %  4-12   EOSINOPHILS RELATIVE PERCENT 1 %  0-6   BASOPHILS RELATIVE PERCENT 1 %  0-1   NEUTROPHILS ABSOLUTE COUNT 2 88 Thousands/?L  1 85-7 62   LYMPHOCYTES ABSOLUTE COUNT 1 46 Thousands/?L  0 60-4 47   MONOCYTES ABSOLUTE COUNT 0 60 Thousand/?L  0 17-1 22   EOSINOPHILS ABSOLUTE COUNT 0 06 Thousand/?L  0 00-0 61   BASOPHILS ABSOLUTE COUNT 0 06 Thousands/?L  0 00-0 10   - Patient Instructions: This bloodwork is non-fasting  Please drink two glasses of water morning of bloodwork  - Patient Instructions: This bloodwork is non-fasting  Please drink two glasses of water morning of bloodwork  (1) COMPREHENSIVE METABOLIC PANEL [id number] 97:25HC Jessica Couch    Order Number: [id number]     Test Name Result Flag Reference   GLUCOSE,RANDM 91 mg/dL     If the patient is fasting, the ADA then defines impaired fasting glucose as > 100 mg/dL and diabetes as > or equal to 123 mg/dL     SODIUM 140 mmol/L  136-145   POTASSIUM 4 1 mmol/L  3 5-5 3   CHLORIDE 105 mmol/L  100-108   CARBON DIOXIDE 29 mmol/L  21-32   ANION GAP (CALC) 6 mmol/L  4-13 BLOOD UREA NITROGEN 9 mg/dL  5-25   CREATININE 0 61 mg/dL  0 60-1 30   Standardized to IDMS reference method   CALCIUM 9 3 mg/dL  8 3-10 1   BILI, TOTAL 0 20 mg/dL  0 20-1 00   ALK PHOSPHATAS 236 U/L H    ALT (SGPT) 14 U/L  12-78   AST(SGOT) 10 U/L  5-45   ALBUMIN 3 3 g/dL L 3 5-5 0   TOTAL PROTEIN 7 5 g/dL  6 4-8 2   eGFR Non-African American      >60 0 ml/min/1 73sq [facility] Disease Education Program recommendations are as follows:  GFR calculation is accurate only with a steady state creatinine  Chronic Kidney disease less than 60 ml/min/1 73 sq  meters  Kidney failure less than 15 ml/min/1 73 sq  meters  (1) C-REACTIVE PROTEIN 42TLH4753 11:12AM Jessica Couch   TW Order Number: [id number]     Test Name Result Flag Reference   C-REACT PROTEIN 15 3 mg/L H <3 0     (1) SED RATE 10AKZ5464 11:12AM Jessica Couch   TW Order Number: [id number]     Test Name Result Flag Reference   SED RATE 18 mm/hour  0-20     * XR FOOT 3+ VIEW LEFT [id number] 12:00AM EPIC, Provider   Test ordered by: Citlalli Robles     Test Name Result Flag Reference   XR FOOT 3+ VW LEFT (Report)     LEFT FOOT     INDICATION: Left foot pain  COMPARISON: None     VIEWS: AP, lateral and oblique ; 3 images     FINDINGS:     Healing transverse nondisplaced stress fracture at the neck of the 3rd metatarsal is identified  Callus bridges the fracture at this time  No degenerative changes  No lytic or blastic lesions are seen  Soft tissues are unremarkable  IMPRESSION:     Healing nondisplaced stress fracture, 3rd metatarsal neck  Workstation performed: [id number]     Signed by:   Concepción Gilmore MD   12/7/16       Future Appointments    Date/Time Provider Specialty Site   12/23/2016 09:45 AM Beny Wells, [address] Endocrinology [facility] ENDOCRINOLOG[facility]   03/03/2017 08:40 AM Jessica Couch DO Rheumatology [facility]     Signatures   Electronically signed by : Bishop Nitin DO; Dec 23 2016  8:53AM EST                       (Author)

## 2018-01-13 NOTE — RESULT NOTES
Message   Please discusse MRI results w/ primary care physician  No monoclonal bands are noted in the urine and serum proteins  But she has an elevated alpha protein  I will ask Dr Beto Soares if I should refer her to hematology  Verified Results  * MRI ABDOMEN W WO CONTRAST AND MRCP [id number] 07:11PM Sharmaine Hamman   TW Order Number: [id number]     Test Name Result Flag Reference   MRI ABDOMEN W WO CONTRAST AND MRCP (Report)     This is a summary report  The complete report is available in the patient's medical record  If you cannot access the medical record, please contact the sending organization for a detailed fax or copy  MRI OF THE ABDOMEN (LIVER) WITH AND WITHOUT CONTRAST WITH MRCP     INDICATION: Isoechoic left lobe liver lesion noted on recent ultrasound  COMPARISON: Ultrasound 4/5/2016  TECHNIQUE: The following pulse sequences were obtained on a 1 5 T scanner: Coronal and axial T2 with TE of 90 and 180 respectively, axial T2 with fat saturation, axial FIESTA fat-sat, axial T1-weighted in-and-out-of phase, pre-contrast axial T1 with    fat saturation, post-contrast dynamic axial T1 with fat saturation at 20, 70, and 180 seconds, followed by coronal and 7 minute delayed axial T1 with fat saturation  3D MRCP images were obtained with radial thick slabs and projections  3D rendering was    performed from the acquisition scanner  3 mL of Gadavist gadolinium was administered intravenously without immediate complication  FINDINGS:     LIVER:    General: Normal in size and contour  No loss of signal on out-of-phase images to suggest hepatic steatosis  Lesions: Arterial enhancing lesion adjacent to the middle hepatic vein, corresponding to the sonographic finding, measuring 1 cm with isoechoic T1 and T2 signal compared to surrounding liver parenchyma in keeping with [facility]  Vasculature: Portal and hepatic veins patent without evidence of thrombosis       MRCP:    No intra-hepatic biliary ductal dilatation  Common bile duct is normal in caliber  No evidence of bile duct stricture or choledocholithiasis  No mass identified  GALLBLADDER: Normal      PANCREAS: Normal      ADRENAL GLANDS: Normal      SPLEEN: Normal      KIDNEYS: Complex cyst in the left kidney measuring 5 mm  Simple left lower pole cyst measuring 6 mm  No hydronephrosis  ABDOMINAL CAVITY: No lymphadenopathy or mass  BOWEL: Unremarkable MRI appearance  OSSEOUS STRUCTURES: No osseous destruction  EXTRAHEPATIC VASCULAR STRUCTURES: Visualized vasculature is normal      ABDOMINAL WALL: Normal      LUNG BASES: Unremarkable MRI appearance  IMPRESSION:     Arterial enhancing lesion adjacent to the middle hepatic vein, corresponding to the sonographic finding, measuring 1 cm with isoechoic T1 and T2 signal compared to surrounding liver parenchyma in keeping with [facility]  Workstation performed: [id number]     Signed by: Marlene Owens MD   5/6/16     (1) PROTEIN ELECTRO, SERUM 29Apr2016 09:00AM [employer]    Order Number: [id number]   Order Number: [id number]     Test Name Result Flag Reference   A/G RATIO 0 98 L 1 10-1 80   Albumin 49 5 % L 52 0-65 0   Albumin Conc  3 17 g/dl L 3 50-5 00   Alpha 1 Conc  0 58 g/dL H 0 10-0 40   ALPHA 1 9 0 % H 2 5-5 0   Alpha 2 Conc  0 95 g/dL  0 40-1 20   ALPHA 2 14 9 % H 7 0-13 0   Beta 1 Conc  0 44 g/dL  0 40-0 80   BETA-1 6 9 %  5 0-13 0   Beta 2 Conc 0 40 g/dL  0 20-0 50   BETA-2 6 2 %  2 0-8 0   Gamma Conc 0 86 g/dL  0 50-1 60   GAMMA GLOBULIN 13 5 %  12 0-22 0   Interpretation      The serum total protein is within normal limits with hypoalbuminemia  The serum protein electrophoresis shows an increased alpha-1 region  No monoclonal bands noted  Reviewed by: Miles Cramer MD ([id number]) **Electronic Signature**   TOTAL PROTEIN 6 4 g/dL  6 4-8 2     (1) PROTEIN ELECTRO, URINE 29Apr2016 09:00AM [employer]    Order Number: [id number]   Order Number: [id number] Test Name Result Flag Reference   ALPHA 1 URINE 19 5 %     ALPHA 2 URINE 14 5 %     BETA URINE 7 6 %     GAMMA GLOBULIN URINE 1 8 %     UPEP INTERPRETATION      The urine total protein is increased  The urine protein electrophoresis shows non-selective proteinuria  No monoclonal bands noted  Reviewed by: Franky Cramer MD ([id number]) **Electronic Signature**   ALBUMIN URINE ELP 56 6 %     URINE PROTEIN 63 0 mg/dL H 2 0-17 5

## 2018-01-13 NOTE — RESULT NOTES
Discussion/Summary   stable size of thyroid nodules, repeat US in 12 months     Verified Results  [address] [id number] 10:09AM Anderson Madalyn Order Number: [id number]    - Patient Instructions: To schedule this appointment, please contact Central Scheduling at [phone]  Test Name Result Flag Reference   US THYROID (Report)     THYROID ULTRASOUND     INDICATION: History of hyperparathyroidism, follow-up nodules     COMPARISON: 6/9/2016, 1/28/2016, and 2/2/2016     TECHNIQUE:  Ultrasound of the thyroid was performed with a high frequency linear transducer in transverse and sagittal planes including volumetric imaging sweeps as well as traditional still imaging technique  FINDINGS:   Normal homogeneous smooth echotexture  Right gland: 2 0 x 2 2 x 5 0 cm  Right midpole nodule measuring 0 6 x 0 5 x 0 5 cm  Solid isoechoic nodule with peripheral calcification  Unchanged from prior  LOW SUSPICION PATTERN (Estimated risk of malignancy 5-10%)  FNA recommended at >/= 1 5 cm      Additional right-sided cysts are seen  Left gland: 1 4 x 1 5 x 4 8 cm  Left midpole nodule measuring 0 8 x 0 8 x 1 2 cm  Spongiform nodule  Unchanged from prior  VERY LOW SUSPICION PATTERN (estimated risk of malignancy < 3%) consider FNA >/= 2 cm versus observation  Left lower pole nodule measuring 1 3 x 1 7 x 1 6 cm  Partially cystic without eccentric solid nodule  This nodule has decreased in size from prior  VERY LOW SUSPICION PATTERN (estimated risk of malignancy < 3%) consider FNA >/= 2 cm versus observation  Left lower pole nodule measuring 0 9 x 1 0 x 1 4 cm  Spongiform nodule  Unchanged from prior  VERY LOW SUSPICION PATTERN (estimated risk of malignancy < 3%) consider FNA >/= 2 cm versus observation  Additional cysts are seen  Isthmus: The isthmus is 0 5 cm in AP dimension  IMPRESSION:      There are multiple thyroid nodules identified as above       These are stable with previous non-malignant biopsy results as above  Reference: 2015 American Thyroid Association Management Guidelines for Adult Patients with Thyroid Nodules and Differentiated Thyroid Cancer  Thyroid, Volume 26, Number 1, 2016         Workstation performed: [id number]     Signed by:   Noralee Hodgkin, MD   4/28/17

## 2018-01-14 VITALS
WEIGHT: 89 LBS | DIASTOLIC BLOOD PRESSURE: 60 MMHG | HEART RATE: 80 BPM | SYSTOLIC BLOOD PRESSURE: 104 MMHG | BODY MASS INDEX: 16.82 KG/M2

## 2018-01-14 NOTE — RESULT NOTES
Discussion/Summary   discuss in office     Verified Results  (1) T4, FREE 25Nov2017 10:51AM Jacqui Hopkins    Order Number: [id number]     Test Name Result Flag Reference   T4,FREE 0 87 ng/dL  0 76-1 46   Specimen collection should occur prior to Sulfasalazine administration due to the potential for falsely elevated results  (1) TSH [id number] 10:51AM Jacqui Hopkins    Order Number: [id number]     Test Name Result Flag Reference   TSH 1 040 uIU/mL  0 358-3 740   Patients undergoing fluorescein dye angiography may retain small amounts of fluorescein in the body for 48-72 hours post procedure  Samples containing fluorescein can produce falsely depressed TSH values  If the patient had this procedure,a specimen should be resubmitted post fluorescein clearance  The recommended reference ranges for TSH during pregnancy are as follows:  First trimester 0 1 to 2 5 uIU/mL  Second trimester  0 2 to 3 0 uIU/mL  Third trimester 0 3 to 3 0 uIU/m     (1) RENAL FUNCTION PANEL 25Nov2017 10:51AM Jacqui Hopkins    Order Number: [id number]     Test Name Result Flag Reference   ANION GAP (CALC) 4 mmol/L  4-13   BLOOD UREA NITROGEN 11 mg/dL  5-25   CALCIUM 9 3 mg/dL  8 3-10 1   CHLORIDE 104 mmol/L  100-108   CARBON DIOXIDE 30 mmol/L  21-32   CREATININE 0 59 mg/dL L 0 60-1 30   Standardized to IDMS reference method   GLUCOSE,RANDM 88 mg/dL     If the patient is fasting, the ADA then defines impaired fasting glucose as > 100 mg/dL and diabetes as > or equal to 123 mg/dL  Specimen collection should occur prior to Sulfasalazine administration due to the potential for falsely depressed results  Specimen collection should occur prior to Sulfapyridine administration due to the potential for falsely elevated results     POTASSIUM 4 5 mmol/L  3 5-5 3   SODIUM 138 mmol/L  136-145   PHOSPHORUS 3 0 mg/dL  2 7-4 5   eGFR 103 ml/min/1 73sq m     [facility] Disease Education Program recommendations are as follows:  GFR calculation is accurate only with a steady state creatinine  Chronic Kidney disease less than 60 ml/min/1 73 sq  meters  Kidney failure less than 15 ml/min/1 73 sq  meters  (1) PTH N-TERMINAL (INTACT) 56IQY4801 10:51AM [facility] Order Number: [id number]     Test Name Result Flag Reference   PARATHYROID HORMONE INTACT 39 5 pg/mL  14 0-72 0     (1) VITAMIN D 25-HYDROXY 92RXN8430 10:51AM [facility] Order Number: [id number]     Test Name Result Flag Reference   VIT D 25-HYDROX 79 9 ng/mL  30 0-100 0   This assay is a certified procedure of the CDC Vitamin D Standardization Certification Program (VDSCP)     Deficiency <20ng/ml   Insufficiency 20-30ng/ml   Sufficient  ng/ml     *Patients undergoing fluorescein dye angiography may retain small amounts of fluorescein in the body for 48-72 hours post procedure  Samples containing fluorescein can produce falsely elevated Vitamin D values  If the patient had this procedure, a specimen should be resubmitted post fluorescein clearance  (1) HEPATIC FUNCTION PANEL 17SZS4635 10:51AM [facility] Order Number: [id number]     Test Name Result Flag Reference   ALBUMIN 3 2 g/dL L 3 5-5 0   ALK PHOSPHATAS 197 U/L H    ALT (SGPT) 18 U/L  12-78   Specimen collection should occur prior to Sulfasalazine and/or Sulfapyridine administration due to the potential for falsely depressed results  AST(SGOT) 14 U/L  5-45   Specimen collection should occur prior to Sulfasalazine and/or Sulfapyridine administration due to the potential for falsely depressed results     BILI, DIRECT 0 11 mg/dL  0 00-0 20   BILI, TOTAL 0 33 mg/dL  0 20-1 00   TOTAL PROTEIN 7 7 g/dL  6 4-8 2

## 2018-01-15 VITALS
HEIGHT: 61 IN | SYSTOLIC BLOOD PRESSURE: 92 MMHG | HEART RATE: 74 BPM | DIASTOLIC BLOOD PRESSURE: 50 MMHG | WEIGHT: 90.5 LBS | BODY MASS INDEX: 17.09 KG/M2

## 2018-01-15 NOTE — MISCELLANEOUS
Message  Abnormal serum proteins discussed with hematology  Will order serum free light chain  If his comes abnormal she would need to see hematology  I ordered it, send her the prescription      Plan  Abnormal SPEP    · (1) FREE LIGHT CHAINS, SERUM; Status:Active;  Requested for:[id number];     Signatures   Electronically signed by : PEPE John ; May 12 2016  5:54PM EST                       (Author)

## 2018-01-15 NOTE — RESULT NOTES
Message   liver function test remains elevated   Send a copy of the results to PCP and GI     Verified Results  (1) ALKALINE PHOSPHATASE ISOENZYMES [id number] 09:44AM Bhakti Speed   TW Order Number: [id number]     Test Name Result Flag Reference   ALK PHOSPHATAS 226 IU/L H 39 - 117   BONE ISOENZYMES 10 % L 14 - 68   INTEST ISOENZYM 0 %  0 - 18   Performed at:  [facility]  [id number]  : Suresh Dueñas MD, Phone:  [phone]  Performed at:  [address]  [id number]  : Gloria Garcia MD, Phone:  [phone]   LIVER ISOENZ 90 % H 18 - 85

## 2018-01-16 NOTE — RESULT NOTES
Message   same liver enzyme pattern, fu with GI specialist  IgA protein normalized  Verified Results  (1) CELIAC DISEASE AB PROFILE 29Apr2016 09:00AM Odilon Barbosa Order Number: [id number]     Test Name Result Flag Reference   tTG IGG <2 U/mL  0 - 5   Negative        0 - 5                              Weak Positive   6 - 9                              Positive           >9   tTG IGA <2 U/mL  0 - 3   Negative        0 -  3                              Weak Positive   4 - 10                              Positive           >10 Tissue Transglutaminase (tTG) has been identified as the endomysial antigen  Studies have demonstr- ated that endomysial IgA antibodies have over 99% specificity for gluten sensitive enteropathy     GLIADA 7 units  0 - 19   Negative                   0 - 19                   Weak Positive             20 - 30                   Moderate to Strong Positive   >30   GLIADG 4 units  0 - 19   Negative                   0 - 19                   Weak Positive             20 - 30                   Moderate to Strong Positive   >30   ENDOMYSIAL AB IGA Negative  Negative   Performed at:  [facility] [address]  [id number]  : Eleazar Terrazas MD, Phone:  [phone]    mg/dL  87 - 352     (1) ALKALINE PHOSPHATASE ISOENZYMES 29Apr2016 09:00AM Odilon Barbosa Order Number: [id number]     Test Name Result Flag Reference   ALK PHOSPHATAS 188 IU/L H 39 - 117   BONE ISOENZYMES 14 %  14 - 68   INTEST ISOENZYM 0 %  0 - 18   Performed at:  [facility] [address]  [id number]  : Eleazar Terrazas MD, Phone:  [phone]  Performed at:  [address]  [id number]  : Belkis Xie MD, Phone:  [phone]   LIVER ISOENZ 86 % H 18 - 85

## 2018-01-16 NOTE — RESULT NOTES
Message   Abnormal 1 cm lesion in the liver  We ordered a liver MRI  Please make an appointment with your primary care physician to discuss these results and further follow-up as necessary  Verified Results  US LIVER 05Apr2016 09:41AM Abby Lazaro    Order Number: [id number]     Test Name Result Flag Reference   US LIVER (Report)     RIGHT UPPER QUADRANT ULTRASOUND     INDICATION: Abnormal liver enzymes  COMPARISON: None  TECHNIQUE:  Real-time ultrasound of the right upper quadrant was performed with a curvilinear transducer with both volumetric sweeps and still imaging techniques  FINDINGS:     PANCREAS: Visualized portions of the pancreas are within normal limits  AORTA AND IVC: Visualized portions are normal for patient age  LIVER:   Size: Within normal range  The liver measures 14 cm in the midclavicular line  Contour: Surface contour is smooth  Parenchyma: Echogenicity and echotexture are within normal limits  In the left medial segment there is a round subtle isoechoic indeterminate nodule measuring 10 x 9 x 10 mm  This abuts the middle hepatic vein  Consider further characterization with liver MRI  The main portal vein is patent and hepatopetal       BILIARY:   The gallbladder is normal in caliber  No wall thickening or pericholecystic fluid  No stones or sludge identified  No sonographic Randolph's sign  No intrahepatic biliary dilatation  CBD measures 4 mm  No choledocholithiasis  KIDNEY:    Right kidney measures 12 x 4 cm  Within normal limits  ASCITES:  None  IMPRESSION:     Liver appears normal in size and echogenicity with normal echotexture  Subtle isoechoic 1 cm nodule seen in the medial segment  Suggest liver MRI to confirm and characterize         Workstation performed: [id number]     Signed by:   Michelle Blackburn MD   4/6/16       Plan  Abnormal liver ultrasound    · * MRI ABDOMEN W WO CONTRAST AND MRCP; Status:Need Information - Financial  Authorization;  Requested for:[id number];

## 2018-01-16 NOTE — RESULT NOTES
Message   patient can see me sooner if she has completed all her scans- DXA,, Parathyroid and ultrasound  I reviewed her labs        Verified Results  (1) HEPATIC FUNCTION PANEL [id number] 03:55PM Anaid Newman     Test Name Result Flag Reference   ALBUMIN 3 0 < 3 5-5 0g/dl       Signatures   Electronically signed by : PEPE Aguilar ; Jan 14 2016  5:24PM EST                       (Author)

## 2018-01-16 NOTE — RESULT NOTES
Message   Seen in office     Verified Results  (1) RENAL FUNCTION PANEL [id number] 10:40AM Gay Jerry Order Number: [id number]     Test Name Result Flag Reference   PHOSPHORUS 2 3 mg/dL L 2 7-4 5   - Patient Instructions: This bloodwork is non-fasting  Please drink two glasses of water morning of bloodwork  (1) PTH N-TERMINAL (INTACT) [id number] 10:40AM [employer]     Test Name Result Flag Reference   PARATHYROID HORMONE INTACT 146 9 pg/mL H 14 0-72 0     (1) T4, FREE [id number] 10:40AM [employer]     Test Name Result Flag Reference   T4,FREE 0 77 ng/dL  0 76-1 46   - Patient Instructions: This bloodwork is non-fasting  Please drink two glasses of water morning of bloodwork  (1) VITAMIN D 25-HYDROXY [id number] 10:40AM [employer]     Test Name Result Flag Reference   VIT D 25-HYDROX 35 7 ng/mL  30 0-100 0   This assay is a certified procedure of the CDC Vitamin D Standardization Certification Program (VDSCP)     Deficiency <20ng/ml   Insufficiency 20-30ng/ml   Sufficient  ng/ml     *Patients undergoing fluorescein dye angiography may retain small amounts of fluorescein in the body for 48-72 hours post procedure  Samples containing fluorescein can produce falsely elevated Vitamin D values  If the patient had this procedure, a specimen should be resubmitted post fluorescein clearance  (1) TSH [id number] 10:40AM [employer]     Test Name Result Flag Reference   TSH 0 714 uIU/mL  0 358-3 740   - Patient Instructions: This bloodwork is non-fasting  Please drink two glasses of water morning of bloodwork  - Patient Instructions: This bloodwork is non-fasting  Please drink two glasses of water morning of bloodwork  Patients undergoing fluorescein dye angiography may retain small amounts of fluorescein in the body for 48-72 hours post procedure  Samples containing fluorescein can produce falsely depressed TSH values   If the patient had this procedure,a specimen should be resubmitted post fluorescein clearance            The recommended reference ranges for TSH during pregnancy are as follows:  First trimester 0 1 to 2 5 uIU/mL  Second trimester  0 2 to 3 0 uIU/mL  Third trimester 0 3 to 3 0 uIU/m

## 2018-01-16 NOTE — RESULT NOTES
Message   Report has been discussed with the patient by the surgeon Dr Donney Homans In his appointment  Patient agrees to repeat biopsy in next 3-6 months     Verified Results  (1) NON-GYNECOLOGIC CYTOLOGY [id number] 10:05AM David Gonzales   CC: Dr Jaya Bose Name Result Flag Reference   LAB AP CASE REPORT (Report)     Non-gynecologic Cytology             Case: [id number]                  Authorizing Provider: Whit Terrazas MD      Collected:      02/02/2016 1005        Ordering Location:   [address] [place] [address]   Received:      02/02/2016 [facility] Ultrasound                              Pathologist:      Franchesca Costa MD                               Specimens:  A) - Thyroid, Left, upper pole/posterior                                B) - Thyroid, Left, upper pole/posterior                                C) - Thyroid, Left, lower pole                                     D) - Thyroid, Left, lower pole                                     E) - Thyroid, Left, mid pole                                      F) - Thyroid, Left, mid pole   LAB AP CYTO FINAL DIAGNOSIS (Report)     A -B  Thyroid, Left, upper pole/posterior (Thin-prep and smear   preparations): Follicular lesion of undetermined significance ([place] Category   III) - See note  Atypical follicular cells with marked nuclear enlargement and   hyperchromasia in a background of debris & scant colloid  Satisfactory for evaluation  NOTE:   (1) As reported in the [facility] for Reporting Thyroid   Cytopathology*, this diagnostic category has demonstrated anywhere from 5%   - 15% risk of malignancy being found in subsequent resections  The manual   reports that the usual management following this diagnosis is repeating   the FNA, no sooner than 3 months time (sooner if sudden change in size   occurs)  Ultimately, clinical/imaging correlation for this patient is   needed in arriving at the actual management plan    *The [place] System for Reporting Thyroid Cytopathology, Haylee Sandhu ;   Donya Bassett [facility] ) 2010 (1st Ed )  (2) In the Revised American Thyroid Association Management Guidelines for   Patients with Thyroid Nodules and Differentiated Thyroid Cancer (November 2009), Recommendation 8 states: The use of molecular markers may be   considered for patients with indeterminate cytology on FNA to help guide   management*  * Revised American Thyroid Association Management Guidelines for Patients   with Thyroid Nodules and Differentiated Thyroid Cancer: The American   Thyroid Association (HIRAM) Guidelines Taskforce on Thyroid Nodules and   Differentiated Thyroid Cancer, Thyroid Vol 19 (11), 2009     C -D  Thyroid, Left, lower pole (Thin-prep and smear preparations):    Negative for malignancy ([place] Category II) - See note  Occasional groups of benign follicular epithelial cells in loose   sheets amongst abundant foamy macrophages and moderate colloid, suggestive   of a degenerating follicular nodule  Satisfactory for evaluation for a cystic lesion    Note: As reported in the [facility] for Reporting Thyroid   Cytopathology*, the diagnostic category of benign/negative for   malignancy carries 0% to 3% risk of malignancy being found in subsequent   resections (in the few studies of patients with benign FNA results that   were followed long-term, a falsenegative rate of <1% was reported), with   the usual management being clinical follow-up supplemented by  ultrasonography (US) as indicated  Repeat FNA may be indicated for nodules   showing significant growth or developing US abnormalities  Ultimately,   clinical/imaging correlation for this patient is needed in arriving at the   actual management plan  *The [place] System for Reporting Thyroid Cytopathology, Dayron De Santiago ), 2010 (1st ed )    E -F  Thyroid, Left, mid pole (Thin-prep and smear preparations):      Follicular lesion of undetermined significance ([place] Category   III) - See note  Atypical follicular cells with enlarged nuclei in crowded and   overlapping groups, amongst debris and abundant colloid  Satisfactory for evaluation  NOTE:   (1) As reported in the [address] [facility] [address] for Reporting Thyroid   Cytopathology*, this diagnostic category has demonstrated anywhere from 5%   - 15% risk of malignancy being found in subsequent resections  The manual   reports that the usual management following this diagnosis is repeating   the FNA, no sooner than 3 months time (sooner if sudden change in size   occurs)  Ultimately, clinical/imaging correlation for this patient is   needed in arriving at the actual management plan  *The [place] System for Reporting Thyroid Cytopathology, Haylee Sandhu ;   Donya Bassett [facility] ) 2010 (1st Ed )  (2) In the Revised American Thyroid Association Management Guidelines for   Patients with Thyroid Nodules and Differentiated Thyroid Cancer (November 2009), Recommendation 8 states: The use of molecular markers may be   considered for patients with indeterminate cytology on FNA to help guide   management*  * Revised American Thyroid Association Management Guidelines for Patients   with Thyroid Nodules and Differentiated Thyroid Cancer: The American   Thyroid Association (HIRAM) Guidelines Taskforce on Thyroid Nodules and   Differentiated Thyroid Cancer, Thyroid Vol 19 (11), 2009  LAB AP INTRAOPERATIVE CONSULTATION (Report)     Thyroid, left upper pole posterior, FNAB on-site evaluation: Adequate  Thyroid, left lower pole, FNAB on-site evaluation: Adequate for cystic   lesion, that is colloid is present  Thyroid, left mid pole, FNAB on-site evaluation: Not adequate-request   additional passes  Dr Salas Bodily  LAB AP GROSS DESCRIPTION (Report)     A  Thyroid, Left, upper pole/posterior: 20ml  Slightly bloody, received in   CytoLyt    B   Thyroid, Left, upper pole/posterior: Received 6 slides ( 3 diff quik /   3 alcohol fixed )    C  Thyroid, Left, lower pole: 20ml  Slightly bloody, received in CytoLyt    D  Thyroid, Left, lower pole: Received 6 slides ( 3 diff quik / 3 alcohol   fixed )    E  Thyroid, Left, mid pole: 20ml  Slightly bloody, received in CytoLyt    F  Thyroid, Left, mid pole: Received 12 slides ( 6 diff quik / 6 alcohol   fixed )   LAB AP CLINICAL INFORMATION (Report)     Size:LUPP-1 6X 7X  7CM Margins: ABSENT Echogenicity: SOLID Microcalcs: ABSENT Flow: INTRANODULAR Size change: NOT GIVEN Suspicion level: INTERMEDIATE Hx of Hashimoto's Thyroiditis: NO    Size: LLP-2 4X1  3X1 6CM  Margins: SMOOTH Echogenicity: SOLID/CYSTIC Microcalcs: ABSENT Flow: PERIPHERAL Size change: NOT GIVEN Suspicion level: INTERMEDIATE Hx of Hashimoto's Thyroiditis: NO    Size: LMP-1 2X 8X 8 Margins: IRREGULAR Echogenicity: SOLID Microcalcs: PRESENT Flow: INTRANODULAR Size change: NOT GIVEN Suspicion level: INTERMEDIATE Hx of Hashimoto's Thyroiditis: NO   LAB AP NONGYN ADDITIONAL INFORMATION (Report)     [employer] FDA approved ,  and ThinPrep Imaging System are   utilized with strict adherence to the 's instruction manual to   prepare gynecologic and non-gynecologic cytology specimens for the   production of ThinPrep slides as well as for gynecologic ThinPrep imaging  These processes have been validated by our laboratory and/or by the     These tests were developed and their performance characteristics   determined by [facility] [address] [place] [address] or [employer]  They may not be cleared or approved by the [place]  Food and   Drug Administration  The FDA has determined that such clearance or   approval is not necessary  These tests are used for clinical purposes  They should not be regarded as investigational or for research  This   laboratory has been approved by CLIA 88, designated as a high-complexity   laboratory and is qualified to perform these tests

## 2018-01-17 NOTE — MISCELLANEOUS
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)   9  Multiple thyroid nodules (241 1) (E04 2)   10  Osteoporosis (733 00) (M81 0)   11  Parathyroid adenoma (227 1) (D35 1)   12  Rheumatoid arthritis of multiple sites with negative rheumatoid factor (714 0) (M06 09)   13  Rotator cuff tendinitis, right (726 10) (M75 81)   14  Seizure disorder (345 90) (G40 909)   15  Swelling of joint of left wrist (719 03) (M25 432)   16  Thyroid nodule (241 0) (E04 1)   17  Vitamin D deficiency (268 9) (E55 9)    Current Meds   1  Advil 200 MG Oral Tablet; TAKE 2 TABLET 3 times daily PRN pain; Therapy: (Recorded:40Ccn7277) to Recorded   2  Hydroxychloroquine Sulfate 200 MG Oral Tablet; take 1 tablet daily with food; Therapy: 68YRJ3209 to (Evaluate:15Jun2016)  Requested for: 23RSK6303; Last   Rx:01Nyf6327 Ordered   3  Leflunomide 20 MG Oral Tablet; TAKE 1 TABLET DAILY; Therapy: 80WET9298 to (Evaluate:05Ier8799)  Requested for: 65RAJ9411; Last   Rx:65Agk7140 Ordered   4  Phenytoin Sodium 100 MG CAPS; TAKE 1 CAPSULE 3 TIMES DAILY; Therapy: (Recorded:36Awj0312) to Recorded   5  Vitamin D 2000 UNIT Oral Tablet; Take 1 tablet daily; Therapy: 61BCO8057 to (Evaluate:07Loi2837) Recorded    Allergies    1  Penicillins    2   Pollen    Signatures   Electronically signed by : Ulla Nageotte, ; Feb 29 2016  9:50AM EST                       (Author)

## 2018-01-17 NOTE — MISCELLANEOUS
Provider Comments  Provider Comments:   MAREK BRENNAN      [id number]   Electronically signed by : PEPE Salinas ; Oct 28 2016  2:59PM EST                       (Review)

## 2018-01-17 NOTE — RESULT NOTES
Message   will discuss in office, stay hydrated  Verified Results  (1) RENAL FUNCTION PANEL 12Jan2016 09:32PM Geraldine, [address] [facility] Kid[facility] Disease Education Program recommendations are as follows:  GFR calculation is accurate only with a steady state creatinine  Chronic Kidney disease less than 60 ml/min/1 73 sq  meters  Kidney failure less than 15 ml/min/1 73 sq  meters       Test Name Result Flag Reference   ANION GAP (CALC) 10 mmol/L  4-13   ALBUMIN 3 0 g/dL L 3 5-5 0   BLOOD UREA NITROGEN 13 mg/dL  5-25   CALCIUM 10 2 mg/dL H 8 3-10 1   CHLORIDE 110 mmol/L H 100-108   CARBON DIOXIDE 22 mmol/L  21-32   CREATININE 0 51 mg/dL L 0 60-1 30   GLUCOSE,RANDM 64 mg/dL L    POTASSIUM 4 5 mmol/L  3 5-5 3   eGFR Non-African American > ml/min/1 73sq m     SODIUM 142 mmol/L  136-145   PHOSPHORUS 2 3 mg/dL L 2 7-4 5       Signatures   Electronically signed by : PEPE Sandoval ; Jan 13 2016  4:10PM EST                       (Author)

## 2018-01-22 VITALS
SYSTOLIC BLOOD PRESSURE: 104 MMHG | BODY MASS INDEX: 16.63 KG/M2 | DIASTOLIC BLOOD PRESSURE: 68 MMHG | HEART RATE: 80 BPM | WEIGHT: 88 LBS

## 2018-01-24 VITALS
HEART RATE: 72 BPM | SYSTOLIC BLOOD PRESSURE: 100 MMHG | HEIGHT: 61 IN | BODY MASS INDEX: 16.99 KG/M2 | DIASTOLIC BLOOD PRESSURE: 60 MMHG | WEIGHT: 90 LBS

## 2018-05-02 DIAGNOSIS — E21.3 HYPERPARATHYROIDISM (HCC): ICD-10-CM

## 2018-05-02 DIAGNOSIS — E55.9 VITAMIN D DEFICIENCY: ICD-10-CM

## 2018-05-02 DIAGNOSIS — E04.1 NONTOXIC SINGLE THYROID NODULE: ICD-10-CM

## 2018-05-02 DIAGNOSIS — M81.0 AGE-RELATED OSTEOPOROSIS WITHOUT CURRENT PATHOLOGICAL FRACTURE: ICD-10-CM

## 2018-05-02 DIAGNOSIS — E04.2 NONTOXIC MULTINODULAR GOITER: ICD-10-CM

## 2018-06-01 ENCOUNTER — OFFICE VISIT (OUTPATIENT)
Dept: ENDOCRINOLOGY | Facility: CLINIC | Age: 58
End: 2018-06-01
Payer: COMMERCIAL

## 2018-06-01 VITALS
BODY MASS INDEX: 16.88 KG/M2 | HEART RATE: 70 BPM | DIASTOLIC BLOOD PRESSURE: 58 MMHG | HEIGHT: 61 IN | SYSTOLIC BLOOD PRESSURE: 98 MMHG | WEIGHT: 89.4 LBS

## 2018-06-01 DIAGNOSIS — E04.2 MULTIPLE THYROID NODULES: ICD-10-CM

## 2018-06-01 DIAGNOSIS — E55.9 VITAMIN D DEFICIENCY: ICD-10-CM

## 2018-06-01 DIAGNOSIS — E21.3 HYPERPARATHYROIDISM (HCC): ICD-10-CM

## 2018-06-01 DIAGNOSIS — M81.0 OSTEOPOROSIS WITHOUT CURRENT PATHOLOGICAL FRACTURE, UNSPECIFIED OSTEOPOROSIS TYPE: Primary | ICD-10-CM

## 2018-06-01 PROCEDURE — 99214 OFFICE O/P EST MOD 30 MIN: CPT | Performed by: INTERNAL MEDICINE

## 2018-06-01 RX ORDER — LOTEPREDNOL ETABONATE 5 MG/G
GEL OPHTHALMIC
Refills: 1 | COMMUNITY
Start: 2018-05-18

## 2018-06-01 NOTE — PROGRESS NOTES
New Patient Progress Note      Referring Provider  Joanne Elaine Do  [phone]  5 [facility], [address]     History of Present Illness:     PT has  History of primary hyperparathyroidism ,  Status post minimally invasive parathyroid adenoma surgery in 2016,  With normalization of calcium  Now she has secondary hyperparathyroidism because of vitamin-D deficiency  she has vitamin-D deficiency and currently taking vitamin D3 5000 International Units daily,  Recent vitamin-D from November 2017 is 79    no recent history of fracture bones  In last 1 year    No history of falls recently    She has osteoporosis, which was diagnosed in 2016 based on T-score of lumbar spine-3 2, left total hip T-score-3 2, left femoral neck T-score -3 8 consistent with osteoporosis  She was started on Prolia injection every 6 months, so far she received 3 injections due for 4th injection  Her calcium has been in normal range  She denies any recent history of fracture bones  She takes calcium and vitamin-D supplementation  She also has elevated alkaline phosphatase for which she had bone scan did not show any evidence of metastasis only changes of osteoarthritis  She was lost to follow up with Gastroenterology for elevated alkaline phosphatase    PT has  Multinodular thyroid goiter,  With fine-needle aspiration on left upper pole, middle pole and lower pole nodule in 2140,  With  Follicular lesion of undetermined significance,  Repeat fine-needle aspiration by affirm a showed benign cytology on left middle lobe nodule 1 26 cm   repeat thyroid ultrasound from April 2018 reviewed which showed right mid pole nodule 0 6 x 0 5 x 0 5 cm unchanged from prior with low suspicion for malignancy  left mid pole nodule is 0 8 x 0 8 x 1 2 cm it is very low suspicious pattern for malignancy  lower pole left nodule 1 3 x 1 7 x 1 6 cm does nodule is decrease in size from prior     left lower pole nodule 0 9 x 1 0 x 1 4 cm spongiform nodule, stable      No History of external radiation to head/neck/chest   She has +Ve family H/o  thyroid caner  No recent Iodine loading in form of medication, erbs or kelp supplements or radiological diagnostic studies  denies difficulty swallowing  Results for Darrin Mg (MRN [id number]) as of 6/1/2018 11:16   Ref  Range 3/18/2017 10:40 5/27/2017 09:53 11/25/2017 10:51   ERYTHROCYTE SEDIMENTATION RATE Latest Ref Range: 0 - 20 mm/hour 16     PTH Latest Ref Range: 14 0 - 72 0 pg/mL 146 9 (H) 88 2 (H) 39 5   TSH 3RD GENERATON Latest Ref Range: 0 358 - 3 740 uIU/mL 0 714  1 040   Free T4 Latest Ref Range: 0 76 - 1 46 ng/dL 0 77  0 87       Thyroid USG   FINDINGS:  Normal homogeneous smooth echotexture      Right gland:  2 0 x 2 2 x 5 0 cm  Right midpole nodule measuring 0 6 x 0 5 x 0 5 cm  Solid isoechoic nodule with peripheral calcification  Unchanged from prior  LOW SUSPICION PATTERN  (Estimated risk of malignancy 5-10%)  FNA recommended at >/= 1 5 cm     Additional right-sided cysts are seen      Left gland:  1 4 x 1 5 x 4 8 cm  Left midpole nodule measuring 0 8 x 0 8 x 1 2 cm  Spongiform nodule  Unchanged from prior  VERY LOW SUSPICION PATTERN (estimated risk of malignancy < 3%) consider FNA >/=  2 cm versus observation  Left lower pole nodule measuring 1 3 x 1 7 x 1 6 cm  Partially cystic without eccentric solid nodule  This nodule has decreased in size from prior  VERY LOW SUSPICION PATTERN (estimated risk of malignancy < 3%) consider FNA >/=  2 cm versus observation      Left lower pole nodule measuring 0 9 x 1 0 x 1 4 cm  Spongiform nodule  Unchanged from prior  VERY LOW SUSPICION PATTERN (estimated risk of malignancy < 3%) consider FNA >/=  2 cm versus observation      Additional cysts are seen         Isthmus:    The isthmus is 0 5 cm in AP dimension           IMPRESSION:     There are multiple thyroid nodules identified as above      These are stable with previous non-malignant biopsy results as above  Component      Latest Ref Rng & Units 11/25/2017   Sodium      136 - 145 mmol/L 138   Potassium      3 5 - 5 3 mmol/L 4 5   Chloride      100 - 108 mmol/L 104   CO2      21 - 32 mmol/L 30   Anion Gap      4 - 13 mmol/L 4   BUN      5 - 25 mg/dL 11   Creatinine      0 60 - 1 30 mg/dL 0 59 (L)   Glucose      65 - 140 mg/dL 88   Calcium      8 3 - 10 1 mg/dL 9 3   eGFR      ml/min/1 73sq m 103   Total Bilirubin      0 20 - 1 00 mg/dL 0 33   BILIRUBIN DIRECT      0 00 - 0 20 mg/dL 0 11   Alkaline Phosphatase      46 - 116 U/L 197 (H)   AST      5 - 45 U/L 14   ALT      12 - 78 U/L 18   Total Protein      6 4 - 8 2 g/dL 7 7   Albumin      3 5 - 5 0 g/dL 3 2 (L)   Free T4      0 76 - 1 46 ng/dL 0 87   TSH 3RD GENERATON      0 358 - 3 740 uIU/mL 1 040   PTH      14 0 - 72 0 pg/mL 39 5   Vit D, 25-Hydroxy      30 0 - 100 0 ng/mL 79 9   Phosphorus      2 7 - 4 5 mg/dL 3 0     There is no problem list on file for this patient      Past Medical History:   Diagnosis Date    Arthritis     Hyperparathyroidism ([address] )     Osteoporosis     Pollen allergy     Seizure disorder (HCC)       Past Surgical History:   Procedure Laterality Date    BUNIONECTOMY      PARATHYROID GLAND SURGERY      MD EXPLORE PARATHYROID GLANDS Left 2/25/2016    Procedure: MINIMALLY INVASIVE PARATHYROIDECTOMY; PTH MONITORING;  Surgeon: Tiera Rodriguez MD;  Location: [facility];  Service: Surgical Oncology    TUBAL LIGATION        Family History   Problem Relation Age of Onset    Thyroid disease Daughter      Social History   Substance Use Topics    Smoking status: Heavy Tobacco Smoker     Packs/day: 0 50    Smokeless tobacco: Never Used    Alcohol use Yes      Comment: social     Allergies   Allergen Reactions    Penicillins Itching and Rash     Specifically Amoxicillin       Current Outpatient Prescriptions:     Cholecalciferol (VITAMIN D PO), Take 5,000 Units by mouth daily  , Disp: , Rfl:     denosumab (PROLIA) 60 mg/mL, Inject under the skin every 6 (six) months, Disp: , Rfl:     hydroxychloroquine (PLAQUENIL) 200 mg tablet, Take 200 mg by mouth daily  , Disp: , Rfl:     leflunomide (ARAVA) 20 MG tablet, Take 20 mg by mouth daily  , Disp: , Rfl:     LOTEMAX 0 5 % ophth gel, INSTILL 1 DROP 3 TIMES A DAY INTO THE LEFT EYE , Disp: , Rfl: 1    Omega-3 Fatty Acids (FISH OIL) 1200 MG CAPS, Take 1,200 mg by mouth daily With 360mg omega 3, Disp: , Rfl:     phenytoin (DILANTIN) 100 mg ER capsule, Take by mouth 3 (three) times a day , Disp: , Rfl:     Review of Systems   Constitutional: Negative for activity change, appetite change and unexpected weight change  HENT: Negative  Eyes: Negative for visual disturbance  Respiratory: Negative for chest tightness and shortness of breath  Cardiovascular: Negative for chest pain, palpitations and leg swelling  Gastrointestinal: Negative  Endocrine: Negative for polydipsia, polyphagia and polyuria  Genitourinary: Negative  Musculoskeletal: Negative  Neurological: Negative for dizziness, tremors and weakness  Hematological: Negative for adenopathy  Does not bruise/bleed easily  Psychiatric/Behavioral: Negative  Physical Exam:  Body mass index is 16 89 kg/m²  BP 98/58   Pulse 70   Ht 5' 1" (1 549 m)   Wt 40 6 kg (89 lb 6 4 oz)   BMI 16 89 kg/m²    Wt Readings from Last 3 Encounters:   06/01/18 40 6 kg (89 lb 6 4 oz)   12/01/17 40 8 kg (90 lb)   06/20/17 41 1 kg (90 lb 8 oz)       Physical Exam   Constitutional: She is oriented to person, place, and time  She appears well-developed and well-nourished  No distress  Small frame   HENT:   Head: Normocephalic and atraumatic  Eyes: Conjunctivae and EOM are normal  Right eye exhibits no discharge  Left eye exhibits no discharge  Neck: Normal range of motion  Neck supple  No thyromegaly present  Cardiovascular: Normal rate, regular rhythm and normal heart sounds  No murmur heard    Pulmonary/Chest: Effort normal and breath sounds normal  No respiratory distress  She has no wheezes  Musculoskeletal: Normal range of motion  She exhibits no edema, tenderness or deformity  Neurological: She is alert and oriented to person, place, and time  She has normal reflexes  Skin: Skin is warm and dry  She is not diaphoretic  Psychiatric: She has a normal mood and affect  Her behavior is normal    Vitals reviewed  Diabetic Foot Exam    Labs:   No components found for: HA1C  No results found for: GLU    Lab Results   Component Value Date    CREATININE 0 59 (L) 11/25/2017    CREATININE 0 54 (L) 05/27/2017    CREATININE 0 59 (L) 03/18/2017    BUN 11 11/25/2017     11/25/2017    K 4 5 11/25/2017     11/25/2017    CO2 30 11/25/2017     eGFR   Date Value Ref Range Status   11/25/2017 103 ml/min/1 73sq m Final     No components found for: MALBCRER    No results found for: CHOL, HDL, TRIG    Lab Results   Component Value Date    ALT 18 11/25/2017    AST 14 11/25/2017    GGT 37 06/22/2016    ALKPHOS 197 (H) 11/25/2017    BILITOT 0 33 11/25/2017       Lab Results   Component Value Date    FREET4 0 87 11/25/2017       Impression:  1  Osteoporosis without current pathological fracture, unspecified osteoporosis type    2  Hyperparathyroidism ([address] )    3  Multiple thyroid nodules    4  Vitamin D deficiency         Plan:    Diagnoses and all orders for this visit:    Osteoporosis without current pathological fracture, unspecified osteoporosis type  Will order DEXA scan in 6 months before next appointment  For now continue Prolia injection every 6 months, will order basic metabolic profile to ensure her calcium is okay before Prolia injection  Will send requisition for Prolia injection to [facility] once we have the blood work result    Based on her bone density will have to decide future plan of management for osteoporosis   Discussed importance of fall precautions  Continue  vitamin-D supplementation  Discussed to Increase calcium intake by diet as she cannot tolerate calcium supplement    -     Basic metabolic panel; Future  -     Basic metabolic panel Lab Collect; Future  -     T4, free Lab Collect; Future  -     TSH, 3rd generation Lab Collect; Future  -     DXA bone density spine hip and pelvis; Future    Hyperparathyroidism ([address] )  Resolved   s/p Parathyroid surgery       Multiple thyroid nodules    Stable nodules on recent thyroid USG   Follow up yearly     Vitamin D deficiency    Continue current dose of vitamin d   -     Vitamin D 25 hydroxy Lab Collect; Future      Discussed with the patient and all questioned fully answered  She will call me if any problems arise      Counseled patient on diagnostic results, prognosis, risk and benefit of treatment options, instruction for management, importance of treatment compliance, Risk  factor reduction and impressions      Marga Galdamez MD

## 2018-06-02 ENCOUNTER — LAB (OUTPATIENT)
Dept: LAB | Facility: MEDICAL CENTER | Age: 58
End: 2018-06-02
Payer: COMMERCIAL

## 2018-06-02 DIAGNOSIS — M81.0 OSTEOPOROSIS WITHOUT CURRENT PATHOLOGICAL FRACTURE, UNSPECIFIED OSTEOPOROSIS TYPE: ICD-10-CM

## 2018-06-02 DIAGNOSIS — E55.9 VITAMIN D DEFICIENCY: ICD-10-CM

## 2018-06-02 LAB
25(OH)D3 SERPL-MCNC: 82.1 NG/ML (ref 30–100)
ANION GAP SERPL CALCULATED.3IONS-SCNC: 5 MMOL/L (ref 4–13)
BUN SERPL-MCNC: 16 MG/DL (ref 5–25)
CALCIUM SERPL-MCNC: 9 MG/DL (ref 8.3–10.1)
CHLORIDE SERPL-SCNC: 107 MMOL/L (ref 100–108)
CO2 SERPL-SCNC: 28 MMOL/L (ref 21–32)
CREAT SERPL-MCNC: 0.55 MG/DL (ref 0.6–1.3)
GFR SERPL CREATININE-BSD FRML MDRD: 104 ML/MIN/1.73SQ M
GLUCOSE P FAST SERPL-MCNC: 75 MG/DL (ref 65–99)
POTASSIUM SERPL-SCNC: 4 MMOL/L (ref 3.5–5.3)
SODIUM SERPL-SCNC: 140 MMOL/L (ref 136–145)
T4 FREE SERPL-MCNC: 0.77 NG/DL (ref 0.76–1.46)
TSH SERPL DL<=0.05 MIU/L-ACNC: 0.63 UIU/ML (ref 0.36–3.74)

## 2018-06-02 PROCEDURE — 82306 VITAMIN D 25 HYDROXY: CPT

## 2018-06-02 PROCEDURE — 36415 COLL VENOUS BLD VENIPUNCTURE: CPT

## 2018-06-02 PROCEDURE — 84443 ASSAY THYROID STIM HORMONE: CPT

## 2018-06-02 PROCEDURE — 84439 ASSAY OF FREE THYROXINE: CPT

## 2018-06-02 PROCEDURE — 80048 BASIC METABOLIC PNL TOTAL CA: CPT

## 2018-06-04 ENCOUNTER — TELEPHONE (OUTPATIENT)
Dept: ENDOCRINOLOGY | Facility: CLINIC | Age: 58
End: 2018-06-04

## 2018-06-04 NOTE — PROGRESS NOTES
Please inform patient that thyroid blood work is normal, calcium is normal, please send Prolia order to Infusion center , thanks

## 2018-06-04 NOTE — TELEPHONE ENCOUNTER
----- Message from Kimberley Tom MD sent at 6/4/2018  9:05 AM EDT -----  Please inform patient that thyroid blood work is normal, calcium is normal, please send Prolia order to Infusion center , thanks

## 2018-06-04 NOTE — TELEPHONE ENCOUNTER
Discussed with pt, about the results as per provider comment   Pt verbalized understanding  Faxed over Prolia form to infusion center

## 2018-06-25 ENCOUNTER — TRANSCRIBE ORDERS (OUTPATIENT)
Dept: LAB | Facility: CLINIC | Age: 58
End: 2018-06-25

## 2018-06-25 ENCOUNTER — APPOINTMENT (OUTPATIENT)
Dept: LAB | Facility: CLINIC | Age: 58
End: 2018-06-25
Payer: COMMERCIAL

## 2018-06-25 ENCOUNTER — OFFICE VISIT (OUTPATIENT)
Dept: RHEUMATOLOGY | Facility: CLINIC | Age: 58
End: 2018-06-25
Payer: COMMERCIAL

## 2018-06-25 VITALS
BODY MASS INDEX: 17.37 KG/M2 | WEIGHT: 92 LBS | DIASTOLIC BLOOD PRESSURE: 66 MMHG | HEIGHT: 61 IN | HEART RATE: 74 BPM | SYSTOLIC BLOOD PRESSURE: 112 MMHG

## 2018-06-25 DIAGNOSIS — M81.0 AGE-RELATED OSTEOPOROSIS WITHOUT CURRENT PATHOLOGICAL FRACTURE: ICD-10-CM

## 2018-06-25 DIAGNOSIS — M06.9 RHEUMATOID ARTHRITIS INVOLVING MULTIPLE SITES, UNSPECIFIED RHEUMATOID FACTOR PRESENCE: Primary | ICD-10-CM

## 2018-06-25 DIAGNOSIS — M06.09 RHEUMATOID ARTHRITIS OF MULTIPLE SITES WITH NEGATIVE RHEUMATOID FACTOR (HCC): Primary | ICD-10-CM

## 2018-06-25 DIAGNOSIS — E55.9 VITAMIN D DEFICIENCY: ICD-10-CM

## 2018-06-25 LAB
ALBUMIN SERPL BCP-MCNC: 3.2 G/DL (ref 3.5–5)
ALP SERPL-CCNC: 211 U/L (ref 46–116)
ALT SERPL W P-5'-P-CCNC: 21 U/L (ref 12–78)
ANION GAP SERPL CALCULATED.3IONS-SCNC: 6 MMOL/L (ref 4–13)
AST SERPL W P-5'-P-CCNC: 18 U/L (ref 5–45)
BASOPHILS # BLD AUTO: 0.04 THOUSANDS/ΜL (ref 0–0.1)
BASOPHILS NFR BLD AUTO: 1 % (ref 0–1)
BILIRUB SERPL-MCNC: 0.2 MG/DL (ref 0.2–1)
BUN SERPL-MCNC: 20 MG/DL (ref 5–25)
CALCIUM SERPL-MCNC: 8.9 MG/DL (ref 8.3–10.1)
CHLORIDE SERPL-SCNC: 103 MMOL/L (ref 100–108)
CO2 SERPL-SCNC: 30 MMOL/L (ref 21–32)
CREAT SERPL-MCNC: 0.65 MG/DL (ref 0.6–1.3)
CRP SERPL QL: 19.5 MG/L
EOSINOPHIL # BLD AUTO: 0.12 THOUSAND/ΜL (ref 0–0.61)
EOSINOPHIL NFR BLD AUTO: 2 % (ref 0–6)
ERYTHROCYTE [DISTWIDTH] IN BLOOD BY AUTOMATED COUNT: 13.7 % (ref 11.6–15.1)
ERYTHROCYTE [SEDIMENTATION RATE] IN BLOOD: 13 MM/HOUR (ref 0–20)
GFR SERPL CREATININE-BSD FRML MDRD: 99 ML/MIN/1.73SQ M
GLUCOSE P FAST SERPL-MCNC: 76 MG/DL (ref 65–99)
HCT VFR BLD AUTO: 34.9 % (ref 34.8–46.1)
HGB BLD-MCNC: 10.8 G/DL (ref 11.5–15.4)
LYMPHOCYTES # BLD AUTO: 1.65 THOUSANDS/ΜL (ref 0.6–4.47)
LYMPHOCYTES NFR BLD AUTO: 26 % (ref 14–44)
MCH RBC QN AUTO: 27.1 PG (ref 26.8–34.3)
MCHC RBC AUTO-ENTMCNC: 30.9 G/DL (ref 31.4–37.4)
MCV RBC AUTO: 88 FL (ref 82–98)
MONOCYTES # BLD AUTO: 0.69 THOUSAND/ΜL (ref 0.17–1.22)
MONOCYTES NFR BLD AUTO: 11 % (ref 4–12)
NEUTROPHILS # BLD AUTO: 3.77 THOUSANDS/ΜL (ref 1.85–7.62)
NEUTS SEG NFR BLD AUTO: 60 % (ref 43–75)
PLATELET # BLD AUTO: 267 THOUSANDS/UL (ref 149–390)
PMV BLD AUTO: 8.7 FL (ref 8.9–12.7)
POTASSIUM SERPL-SCNC: 3.8 MMOL/L (ref 3.5–5.3)
PROT SERPL-MCNC: 7 G/DL (ref 6.4–8.2)
RBC # BLD AUTO: 3.98 MILLION/UL (ref 3.81–5.12)
SODIUM SERPL-SCNC: 139 MMOL/L (ref 136–145)
WBC # BLD AUTO: 6.27 THOUSAND/UL (ref 4.31–10.16)

## 2018-06-25 PROCEDURE — 85025 COMPLETE CBC W/AUTO DIFF WBC: CPT | Performed by: PHYSICIAN ASSISTANT

## 2018-06-25 PROCEDURE — 36415 COLL VENOUS BLD VENIPUNCTURE: CPT | Performed by: PHYSICIAN ASSISTANT

## 2018-06-25 PROCEDURE — 85652 RBC SED RATE AUTOMATED: CPT | Performed by: PHYSICIAN ASSISTANT

## 2018-06-25 PROCEDURE — 80053 COMPREHEN METABOLIC PANEL: CPT | Performed by: PHYSICIAN ASSISTANT

## 2018-06-25 PROCEDURE — 86140 C-REACTIVE PROTEIN: CPT | Performed by: PHYSICIAN ASSISTANT

## 2018-06-25 PROCEDURE — 99214 OFFICE O/P EST MOD 30 MIN: CPT | Performed by: PHYSICIAN ASSISTANT

## 2018-06-25 RX ORDER — HYDROXYCHLOROQUINE SULFATE 200 MG/1
200 TABLET, FILM COATED ORAL DAILY
Qty: 90 TABLET | Refills: 1 | Status: SHIPPED | OUTPATIENT
Start: 2018-06-25 | End: 2018-12-21 | Stop reason: SDUPTHER

## 2018-06-25 RX ORDER — LEFLUNOMIDE 20 MG/1
20 TABLET ORAL DAILY
Qty: 90 TABLET | Refills: 1 | Status: SHIPPED | OUTPATIENT
Start: 2018-06-25 | End: 2018-12-21 | Stop reason: SDUPTHER

## 2018-06-25 NOTE — PROGRESS NOTES
No problem-specific Assessment & Plan notes found for this encounter  Plan:  Diagnoses and all orders for this visit:    Rheumatoid arthritis of multiple sites with negative rheumatoid factor ([address] )    Age-related osteoporosis without current pathological fracture    Vitamin D deficiency      Assessment: This is a 52-year-old female presenting today for follow-up for rheumatoid arthritis as well as osteoporosis  Patient states that she has not been seen in over a year however did have enough medication to last her up until a few weeks ago  She states that since she has been at this medication she has notice increased pain in the hands and the right ankle  She notes swelling in the right ankle but denies any further obvious joint swelling  She states that stiffness in the right ankle starts in the morning and lasts a few minutes  She also describes some discomfort in her hips  She does have difficulty with sleep but does not relate this to pain  She notes non restorative sleep as well as fatigue  She is utilizing leflunomide as well as hydroxychloroquine and did have an eye exam a few weeks ago  She has not however had any recent blood work  On physical examination, there is synovitis of the left wrist as well as the right ankle  She has tenderness of these areas as well as mild synovitis and tenderness of the PIPs of the right hand  She also has tenderness of both hips with crepitus of the knees  At this time patient's history and physical examination is most consistent with rheumatoid arthritis which appears to be active at this time as patient has been without leflunomide and hydroxychloroquine for a few weeks now  Patient was asked to obtain a CBC, CMP, CRP, and ESR at this time  The importance of having blood work every 3 months while taking these medications was discussed with the patient and she was asked to have blood work on a routine basis    I will refill both of her medications once I receive results of her recent blood work  In addition we will not make any further changes at this time as patient states that her joints were stable while she was taking these medications  She will plan to return to the office in 6 months time however she was given a lab slip to have blood work today as well as in 3 months  She will contact the office if she has any further questions or concerns  Subjective:      Patient ID: Jimbo Zavaleta is a 63 y o   female presenting today for follow up for RA/OP  Patient has not been seen for almost a year but states she had enough medication until a few weeks ago  Increased pain in the right ankle and hands since being out  +Right ankle swelling  No other obvious joint swelling  +Right ankle stiffness in AM with a few minutes  +Difficulty with sleep not related to pain  +Non restorative sleep  +Fatigue  Taking HCQ and Leflunomide  Last eye exam: few weeks ago  HPI  The following portions of the patient's history were reviewed and updated as appropriate: She  has a past medical history of Arthritis; Hyperparathyroidism ([facility] ); Osteoporosis; Pollen allergy; and Seizure disorder ([facility] )     Review of Systems   Constitutional: Positive for fatigue  Negative for appetite change, chills, fever and unexpected weight change  Hot flashes    HENT: Negative for congestion, mouth sores and sore throat  Eyes: Negative for pain, redness and visual disturbance  Respiratory: Negative for cough, chest tightness and shortness of breath  Cardiovascular: Negative for chest pain and leg swelling  Gastrointestinal: Negative for abdominal pain, blood in stool, constipation, diarrhea, nausea and vomiting  Endocrine: Negative for polydipsia and polyuria  Genitourinary: Negative for frequency and hematuria  Skin: Negative for color change and rash  Neurological: Positive for numbness (Tingling ) and headaches (sinuses )   Negative for weakness and light-headedness  Hematological: Negative for adenopathy  Psychiatric/Behavioral: Negative for behavioral problems  The patient is not nervous/anxious  Objective:    Physical Exam   Constitutional: She is oriented to person, place, and time  She appears well-developed and well-nourished  HENT:   Head: Normocephalic  Mouth/Throat: Oropharynx is clear and moist    Eyes: Conjunctivae are normal  Pupils are equal, round, and reactive to light  Neck: Normal range of motion  Neck supple  Cardiovascular: Normal rate, regular rhythm and normal heart sounds  Pulmonary/Chest: Effort normal and breath sounds normal    Musculoskeletal: Normal range of motion  Crepitus of the knees  Neurological: She is alert and oriented to person, place, and time  Skin: Skin is warm and dry  Psychiatric: She has a normal mood and affect   Her behavior is normal        Physical Exam     Tenderness:   LUE: wrist  Right hand: 2nd PIP, 3rd PIP, 4th PIP and 5th PIP  RLE: acetabulofemoral and tibiotalar  LLE: acetabulofemoral    Swelling:   LUE: wrist  Right hand: 2nd PIP, 3rd PIP, 4th PIP and 5th PIP  RLE: tibiotalar    HURD-28 tender joint count: 5  HURD-28 swollen joint count: 5      Results Reviewed     None

## 2018-07-30 ENCOUNTER — TRANSCRIBE ORDERS (OUTPATIENT)
Dept: ADMINISTRATIVE | Facility: HOSPITAL | Age: 58
End: 2018-07-30

## 2018-07-30 DIAGNOSIS — Z72.0 TOBACCO ABUSE: ICD-10-CM

## 2018-07-30 DIAGNOSIS — IMO0001 LIVER REGENERATION: Primary | ICD-10-CM

## 2018-08-02 ENCOUNTER — TELEPHONE (OUTPATIENT)
Dept: ENDOCRINOLOGY | Facility: CLINIC | Age: 58
End: 2018-08-02

## 2018-08-03 ENCOUNTER — DOCUMENTATION (OUTPATIENT)
Dept: ENDOCRINOLOGY | Facility: CLINIC | Age: 58
End: 2018-08-03

## 2018-08-08 ENCOUNTER — TELEPHONE (OUTPATIENT)
Dept: ENDOCRINOLOGY | Facility: CLINIC | Age: 58
End: 2018-08-08

## 2018-08-08 NOTE — TELEPHONE ENCOUNTER
Patient's insurance denied the Prolia injection  I tried to Appeal the decision  Prolia still denied  There were no previous bisphosphonates on file  Can you order or recommend a different therapy? Please advise

## 2018-08-10 ENCOUNTER — HOSPITAL ENCOUNTER (OUTPATIENT)
Dept: CT IMAGING | Facility: HOSPITAL | Age: 58
Discharge: HOME | End: 2018-08-10
Attending: INTERNAL MEDICINE
Payer: COMMERCIAL

## 2018-08-10 ENCOUNTER — HOSPITAL ENCOUNTER (OUTPATIENT)
Dept: ULTRASOUND IMAGING | Facility: HOSPITAL | Age: 58
Discharge: HOME | End: 2018-08-10
Attending: INTERNAL MEDICINE
Payer: COMMERCIAL

## 2018-08-10 DIAGNOSIS — Z72.0 TOBACCO ABUSE: ICD-10-CM

## 2018-08-10 DIAGNOSIS — IMO0001 LIVER REGENERATION: ICD-10-CM

## 2018-08-10 PROCEDURE — 71250 CT THORAX DX C-: CPT

## 2018-08-10 PROCEDURE — 76705 ECHO EXAM OF ABDOMEN: CPT

## 2018-08-13 NOTE — TELEPHONE ENCOUNTER
This pt has been receving Prolia injection , she has already received 3 doses, can we do peer to peer review   Thanks     Cherlynn Lefort, MD

## 2018-08-16 NOTE — TELEPHONE ENCOUNTER
Spoke to [employer] and he said we are still waiting for a call to do a peer to peer review  They have until 8/28

## 2018-08-20 NOTE — TELEPHONE ENCOUNTER
Peer to Peer was denied  We received denial today  What is an alternative therapy for this patient? Thank you

## 2018-08-20 NOTE — TELEPHONE ENCOUNTER
Please inform pt to get Dexa scan as ordered, based on dexa scan will decide, what is the next option   Thanks     Lacy Mohamud MD

## 2018-10-11 ENCOUNTER — APPOINTMENT (OUTPATIENT)
Dept: LAB | Facility: MEDICAL CENTER | Age: 58
End: 2018-10-11
Payer: COMMERCIAL

## 2018-10-11 DIAGNOSIS — E55.9 VITAMIN D DEFICIENCY: ICD-10-CM

## 2018-10-11 DIAGNOSIS — M06.09 RHEUMATOID ARTHRITIS OF MULTIPLE SITES WITH NEGATIVE RHEUMATOID FACTOR (HCC): ICD-10-CM

## 2018-10-11 DIAGNOSIS — M81.0 AGE-RELATED OSTEOPOROSIS WITHOUT CURRENT PATHOLOGICAL FRACTURE: ICD-10-CM

## 2018-10-11 DIAGNOSIS — E21.3 HYPERPARATHYROIDISM (HCC): ICD-10-CM

## 2018-10-11 DIAGNOSIS — M81.0 OSTEOPOROSIS WITHOUT CURRENT PATHOLOGICAL FRACTURE, UNSPECIFIED OSTEOPOROSIS TYPE: ICD-10-CM

## 2018-10-11 DIAGNOSIS — E04.2 NONTOXIC MULTINODULAR GOITER: ICD-10-CM

## 2018-10-11 LAB
ALBUMIN SERPL BCP-MCNC: 3.1 G/DL (ref 3.5–5)
ALP SERPL-CCNC: 202 U/L (ref 46–116)
ALT SERPL W P-5'-P-CCNC: 22 U/L (ref 12–78)
ANION GAP SERPL CALCULATED.3IONS-SCNC: 4 MMOL/L (ref 4–13)
AST SERPL W P-5'-P-CCNC: 17 U/L (ref 5–45)
BASOPHILS # BLD AUTO: 0.06 THOUSANDS/ΜL (ref 0–0.1)
BASOPHILS NFR BLD AUTO: 1 % (ref 0–1)
BILIRUB SERPL-MCNC: 0.4 MG/DL (ref 0.2–1)
BUN SERPL-MCNC: 16 MG/DL (ref 5–25)
CALCIUM SERPL-MCNC: 9.1 MG/DL (ref 8.3–10.1)
CHLORIDE SERPL-SCNC: 106 MMOL/L (ref 100–108)
CO2 SERPL-SCNC: 28 MMOL/L (ref 21–32)
CREAT SERPL-MCNC: 0.6 MG/DL (ref 0.6–1.3)
CRP SERPL QL: 13.4 MG/L
EOSINOPHIL # BLD AUTO: 0.08 THOUSAND/ΜL (ref 0–0.61)
EOSINOPHIL NFR BLD AUTO: 2 % (ref 0–6)
ERYTHROCYTE [DISTWIDTH] IN BLOOD BY AUTOMATED COUNT: 13.3 % (ref 11.6–15.1)
ERYTHROCYTE [SEDIMENTATION RATE] IN BLOOD: 16 MM/HOUR (ref 0–20)
GFR SERPL CREATININE-BSD FRML MDRD: 102 ML/MIN/1.73SQ M
GLUCOSE P FAST SERPL-MCNC: 70 MG/DL (ref 65–99)
HCT VFR BLD AUTO: 40 % (ref 34.8–46.1)
HGB BLD-MCNC: 11.9 G/DL (ref 11.5–15.4)
IMM GRANULOCYTES # BLD AUTO: 0.01 THOUSAND/UL (ref 0–0.2)
IMM GRANULOCYTES NFR BLD AUTO: 0 % (ref 0–2)
LYMPHOCYTES # BLD AUTO: 2.02 THOUSANDS/ΜL (ref 0.6–4.47)
LYMPHOCYTES NFR BLD AUTO: 39 % (ref 14–44)
MCH RBC QN AUTO: 26.7 PG (ref 26.8–34.3)
MCHC RBC AUTO-ENTMCNC: 29.8 G/DL (ref 31.4–37.4)
MCV RBC AUTO: 90 FL (ref 82–98)
MONOCYTES # BLD AUTO: 0.47 THOUSAND/ΜL (ref 0.17–1.22)
MONOCYTES NFR BLD AUTO: 9 % (ref 4–12)
NEUTROPHILS # BLD AUTO: 2.54 THOUSANDS/ΜL (ref 1.85–7.62)
NEUTS SEG NFR BLD AUTO: 49 % (ref 43–75)
NRBC BLD AUTO-RTO: 0 /100 WBCS
PLATELET # BLD AUTO: 298 THOUSANDS/UL (ref 149–390)
PMV BLD AUTO: 9.3 FL (ref 8.9–12.7)
POTASSIUM SERPL-SCNC: 4.2 MMOL/L (ref 3.5–5.3)
PROT SERPL-MCNC: 7 G/DL (ref 6.4–8.2)
RBC # BLD AUTO: 4.45 MILLION/UL (ref 3.81–5.12)
SODIUM SERPL-SCNC: 138 MMOL/L (ref 136–145)
WBC # BLD AUTO: 5.18 THOUSAND/UL (ref 4.31–10.16)

## 2018-10-11 PROCEDURE — 85025 COMPLETE CBC W/AUTO DIFF WBC: CPT

## 2018-10-11 PROCEDURE — 36415 COLL VENOUS BLD VENIPUNCTURE: CPT

## 2018-10-11 PROCEDURE — 86140 C-REACTIVE PROTEIN: CPT

## 2018-10-11 PROCEDURE — 85652 RBC SED RATE AUTOMATED: CPT

## 2018-10-11 PROCEDURE — 80053 COMPREHEN METABOLIC PANEL: CPT

## 2018-12-11 ENCOUNTER — TELEPHONE (OUTPATIENT)
Dept: ENDOCRINOLOGY | Facility: CLINIC | Age: 58
End: 2018-12-11

## 2018-12-11 DIAGNOSIS — E04.1 THYROID NODULE: ICD-10-CM

## 2018-12-11 DIAGNOSIS — E21.3 HYPERPARATHYROIDISM (HCC): Primary | ICD-10-CM

## 2018-12-11 DIAGNOSIS — E55.9 VITAMIN D DEFICIENCY: ICD-10-CM

## 2018-12-11 NOTE — TELEPHONE ENCOUNTER
----- Message from [facility] COGNITIVE DISORDERS sent at 12/11/2018 11:26 AM EST -----  Regarding: LABS FOR FRIDAY APPT  What would you like to order?

## 2018-12-14 ENCOUNTER — OFFICE VISIT (OUTPATIENT)
Dept: ENDOCRINOLOGY | Facility: CLINIC | Age: 58
End: 2018-12-14
Payer: COMMERCIAL

## 2018-12-14 VITALS
DIASTOLIC BLOOD PRESSURE: 60 MMHG | WEIGHT: 93 LBS | HEIGHT: 61 IN | BODY MASS INDEX: 17.56 KG/M2 | SYSTOLIC BLOOD PRESSURE: 100 MMHG

## 2018-12-14 DIAGNOSIS — E55.9 VITAMIN D DEFICIENCY: ICD-10-CM

## 2018-12-14 DIAGNOSIS — E04.2 MULTIPLE THYROID NODULES: ICD-10-CM

## 2018-12-14 DIAGNOSIS — E21.3 HYPERPARATHYROIDISM (HCC): ICD-10-CM

## 2018-12-14 DIAGNOSIS — M81.0 OSTEOPOROSIS WITHOUT CURRENT PATHOLOGICAL FRACTURE, UNSPECIFIED OSTEOPOROSIS TYPE: Primary | ICD-10-CM

## 2018-12-14 PROCEDURE — 99214 OFFICE O/P EST MOD 30 MIN: CPT | Performed by: INTERNAL MEDICINE

## 2018-12-14 RX ORDER — ALENDRONATE SODIUM 70 MG/1
70 TABLET ORAL
Qty: 12 TABLET | Refills: 1 | Status: SHIPPED | OUTPATIENT
Start: 2018-12-14 | End: 2019-06-28 | Stop reason: SDUPTHER

## 2018-12-14 NOTE — PROGRESS NOTES
Patient Progress Note      Referring Provider  Lucy Ortez Do  [address] [facility], [address]     History of Present Illness:     PT has  History of primary hyperparathyroidism ,  Status post minimally invasive parathyroid adenoma surgery in 2016,  With normalization of calcium  Now she has secondary hyperparathyroidism because of vitamin-D deficiency  she has vitamin-D deficiency and currently taking vitamin D3 5000 International Units daily,    She has osteoporosis, which was diagnosed in 2016 based on T-score of lumbar spine-3 2, left total hip T-score-3 2, left femoral neck T-score -3 8 consistent with osteoporosis  She was started on Prolia injection every 6 months, so far she received 3 injections   Her Prolia injection was denied by [employer], so she could not get 4th injection  She was supposed to get DEXA scan before this appointment, has not scheduled yet  She has not tried oral bisphosphonates previously, denies history of gastroesophageal reflux disease or hiatal hernia or history of gastritis      She denies any recent history of fracture bones  She takes  vitamin-D supplementation, 5000 International Units daily  She also has elevated alkaline phosphatase for which she had bone scan did not show any evidence of metastasis only changes of osteoarthritis  She was lost to follow up with Gastroenterology for elevated alkaline phosphatase    PT has  Multinodular thyroid goiter,  With fine-needle aspiration on left upper pole, middle pole and lower pole nodule in 6246,  With  Follicular lesion of undetermined significance,  Repeat fine-needle aspiration by affirm a showed benign cytology on left middle lobe nodule 1 26 cm   repeat thyroid ultrasound from April 2018 reviewed which showed right mid pole nodule 0 6 x 0 5 x 0 5 cm unchanged from prior with low suspicion for malignancy  left mid pole nodule is 0 8 x 0 8 x 1 2 cm it is very low suspicious pattern for malignancy     lower pole left nodule 1 3 x 1 7 x 1 6 cm does nodule is decrease in size from prior  left lower pole nodule 0 9 x 1 0 x 1 4 cm spongiform nodule,  stable  She is clinically and biochemically euthyroid      No History of external radiation to head/neck/chest   She has +Ve family H/o  thyroid caner  No recent Iodine loading in form of medication, erbs or kelp supplements or radiological diagnostic studies  denies difficulty swallowing       She has not completed blood work before this appointment    She has history of metatarsal stress fractures  Patient Active Problem List   Diagnosis    Rheumatoid arthritis of multiple sites with negative rheumatoid factor ([facility] [address] )    Osteoporosis    Vitamin D deficiency    [facility]    Fracture, stress, metatarsal    Elevated alkaline phosphatase level    Anemia, deficiency     Past Medical History:   Diagnosis Date    Arthritis     [facility]roidism ([facility] )     Osteoporosis     Pollen allergy     Seizure disorder ([facility] )       Past Surgical History:   Procedure Laterality Date    BUNIONECTOMY      PARATHYROID GLAND SURGERY      ID EXPLORE PARATHYROID GLANDS Left 2/25/2016    Procedure: MINIMALLY INVASIVE PARATHYROIDECTOMY; PTH MONITORING;  Surgeon: Woody Dejesus MD;  Location: [facility];  Service: Surgical Oncology    TUBAL LIGATION        Family History   Problem Relation Age of Onset    Thyroid disease Daughter      Social History   Substance Use Topics    Smoking status: Heavy Tobacco Smoker     Packs/day: 0 50    Smokeless tobacco: Never Used    Alcohol use Yes      Comment: social     Allergies   Allergen Reactions    Penicillins Itching and Rash     Specifically Amoxicillin       Current Outpatient Prescriptions:     Cholecalciferol (VITAMIN D PO), Take 5,000 Units by mouth daily  , Disp: , Rfl:     hydroxychloroquine (PLAQUENIL) 200 mg tablet, Take 1 tablet (200 mg total) by mouth daily, Disp: 90 tablet, Rfl: 1    leflunomide (ARAVA) 20 MG tablet, Take 1 tablet (20 mg total) by mouth daily, Disp: 90 tablet, Rfl: 1    Omega-3 Fatty Acids (FISH OIL) 1200 MG CAPS, Take 1,200 mg by mouth daily With 360mg omega 3, Disp: , Rfl:     phenytoin (DILANTIN) 100 mg ER capsule, Take by mouth 3 (three) times a day , Disp: , Rfl:     denosumab (PROLIA) 60 mg/mL, Inject under the skin every 6 (six) months, Disp: , Rfl:     LOTEMAX 0 5 % ophth gel, INSTILL 1 DROP 3 TIMES A DAY INTO THE LEFT EYE , Disp: , Rfl: 1    Review of Systems   Constitutional: Negative for activity change, appetite change and unexpected weight change  HENT: Negative  Eyes: Negative for visual disturbance  Respiratory: Negative for chest tightness and shortness of breath  Cardiovascular: Negative for chest pain, palpitations and leg swelling  Gastrointestinal: Negative  Endocrine: Negative for polydipsia, polyphagia and polyuria  Genitourinary: Negative  Musculoskeletal: Negative  Neurological: Negative for dizziness, tremors and weakness  Hematological: Negative for adenopathy  Does not bruise/bleed easily  Psychiatric/Behavioral: Negative  Physical Exam:  Body mass index is 17 57 kg/m²  /60   Ht 5' 1" (1 549 m)   Wt 42 2 kg (93 lb)   BMI 17 57 kg/m²    Wt Readings from Last 3 Encounters:   12/14/18 42 2 kg (93 lb)   06/25/18 41 7 kg (92 lb)   06/01/18 40 6 kg (89 lb 6 4 oz)       Physical Exam   Constitutional: She is oriented to person, place, and time  She appears well-developed and well-nourished  No distress  Small frame   HENT:   Head: Normocephalic and atraumatic  Eyes: Conjunctivae and EOM are normal  Right eye exhibits no discharge  Left eye exhibits no discharge  Neck: Normal range of motion  Neck supple  No thyromegaly present  Cardiovascular: Normal rate, regular rhythm and normal heart sounds  No murmur heard  Pulmonary/Chest: Effort normal and breath sounds normal  No respiratory distress  She has no wheezes  Musculoskeletal: Normal range of motion  She exhibits no edema, tenderness or deformity  Neurological: She is alert and oriented to person, place, and time  She has normal reflexes  Skin: Skin is warm and dry  She is not diaphoretic  Psychiatric: She has a normal mood and affect  Her behavior is normal    Vitals reviewed  Diabetic Foot Exam    Labs:   No components found for: HA1C  No components found for: GLU    Lab Results   Component Value Date    CREATININE 0 60 10/11/2018    CREATININE 0 65 06/25/2018    CREATININE 0 55 (L) 06/02/2018    BUN 16 10/11/2018     12/28/2015    K 4 2 10/11/2018     10/11/2018    CO2 28 10/11/2018     eGFR   Date Value Ref Range Status   10/11/2018 102 ml/min/1 73sq m Final     No components found for: MALBCRER    No results found for: CHOL, HDL, TRIG, CHOLHDL    Lab Results   Component Value Date    ALT 22 10/11/2018    AST 17 10/11/2018    GGT 37 06/22/2016    ALKPHOS 202 (H) 10/11/2018    BILITOT 0 23 12/28/2015       Lab Results   Component Value Date    FREET4 0 77 06/02/2018       Impression:  1  Osteoporosis without current pathological fracture, unspecified osteoporosis type    2  Hyperparathyroidism ([facility] [address] )    3  Multiple thyroid nodules    4  Vitamin D deficiency         Plan:    Diagnoses and all orders for this visit:    Osteoporosis without current pathological fracture, unspecified osteoporosis type  Discussed with patient to get DEXA scan done, it is anticipated that her bone density will improve as primary hyperparathyroidism was treated  Will start Fosamax 70 mg once a week, discussed about taking medication on empty stomach with full glass of water and stay erect for half an hour after taking medication    Also discussed side effects such as jaw bone necrosis, very rare risk of a traumatic fracture of femur   Discussed importance of fall precautions, and cutting down on smoking  Continue  vitamin-D supplementation  Discussed to Increase calcium intake by diet as she cannot tolerate calcium supplement  Blood work ordered for now and then before next appointment    Hyperparathyroidism [facility])  Resolved   s/p Parathyroid surgery       Multiple thyroid nodules    Stable nodules on recent thyroid USG from 2017, order thyroid ultrasound before next appointment for follow-up    Follow up yearly     Vitamin D deficiency    Continue current dose of vitamin d   -     Vitamin D 25 hydroxy Lab Collect; Future      Discussed with the patient and all questioned fully answered  She will call me if any problems arise      Counseled patient on diagnostic results, prognosis, risk and benefit of treatment options, instruction for management, importance of treatment compliance, Risk  factor reduction and impressions      Jason Jackson MD

## 2018-12-17 NOTE — PROGRESS NOTES
Assessment and Plan: This is a 73-year-old female presenting today for follow-up for seronegative rheumatoid arthritis, as well as osteoporosis followed by endocrinology  Patient states she has swelling and stiffness of her right ankle for the last several months  She states that generally she does not noticed any other joint pain or swelling aside from her right thumb joint  She denies morning stiffness however continues to note fatigue  She is utilizing leflunomide with hydroxychloroquine and does also utilize Fosamax through her endocrinologist   She is up-to-date on her eye exams  Her exam today does reveal a warm and swollen right ankle  She has mild synovitis of several of her right PIP joints  There does not appear to be further synovitis  She does however have tenderness of her PIPs of the right hand  Patient's exam today does reveal activity of her seronegative rheumatoid arthritis with the use of leflunomide and hydroxychloroquine  Her DAS28- CRP score is consistent with her exam revealing moderate activity  I did ask patient to proceed with an x-ray, as well as ultrasound the right ankle to further evaluate her ankle swelling  If ankle swelling continues and is found to be inflammatory in nature, we will consider escalating treatment  She will however continue with leflunomide at hydroxychloroquine at this time  Due to her severe osteoporosis, I am not inclined to start her on a course of Prednisone  She will continue to follow up with endocrinology  She was asked to obtain blood work in January and is up-to-date on her blood work which recently revealed elevated inflammatory markers however was otherwise stable      Plan:  Diagnoses and all orders for this visit:    Rheumatoid arthritis of multiple sites with negative rheumatoid factor ([facility] [address] )    Age-related osteoporosis without current pathological fracture    Hyperparathyroidism [facility])          Rheumatic Disease Summary:  Seronegative RA  Established care- June, 2015- for left wrist swelling and pain with synovitis confirmed on MR  Dx with seronegaitve RA by Dr Digna Truong   -Started on Leflunomide due to inability to use MTX with phenytoin  -December, 2015- added HCQ to Leflunomide  OP  Found to have hyperparathyroidism and severe OP  Placed on Prolia by Endo and since transitioned to Fosamax( mid 2018)        HPI  Jose Bravo is a 63 y o   female who presents today for follow up for seronegative RA/OP  Swelling in the right ankle and stiffness for 4-5 steps  History of several sprained ankles  "Cracking in the joint" as well but no discomfort  No other joint pain or swelling, aside from right thumb joint  No Am stiffness  No difficulty with sleep   +fatigue  Still taking Leflunomide and HCQ  Also on Fosamax as Prolia was no longer covered by insurance through [employer]  +Vitamin D supplementation  No Calcium supplementation due to hyperparathyroidism  Advil with some relief of headaches  Last eye exam: every 6 months  Never on MTX in the past       The following portions of the patient's history were reviewed and updated as appropriate: allergies, current medications, past family history, past medical history, past social history, past surgical history and problem list     Review of Systems:   Review of Systems   Constitutional: Positive for fatigue  Negative for appetite change, chills, fever and unexpected weight change  HENT: Positive for congestion  Negative for mouth sores and sore throat  +URI recently    Eyes: Negative for pain, redness and visual disturbance  +Dry mouth  No dry eyes    Respiratory: Negative for cough, chest tightness and shortness of breath  Cardiovascular: Negative for chest pain and leg swelling  Gastrointestinal: Negative for abdominal pain, blood in stool, constipation, diarrhea, nausea and vomiting  Endocrine: Negative for polydipsia and polyuria     Genitourinary: Negative for frequency and hematuria  Skin: Negative for color change and rash  No hair loss or no nail changes  No raynauds    Neurological: Positive for numbness (right hand which is positional ) and headaches (sinus)  Negative for weakness and light-headedness  Hematological: Negative for adenopathy  Psychiatric/Behavioral: Negative for behavioral problems  The patient is not nervous/anxious  Home Medications:     Current Outpatient Prescriptions:     alendronate (FOSAMAX) 70 mg tablet, Take 1 tablet (70 mg total) by mouth every 7 days WITH full glass of water, on empty stomach, stay upright for half an hour, once a week, Disp: 12 tablet, Rfl: 1    Cholecalciferol (VITAMIN D PO), Take 5,000 Units by mouth daily  , Disp: , Rfl:     hydroxychloroquine (PLAQUENIL) 200 mg tablet, Take 1 tablet (200 mg total) by mouth daily, Disp: 90 tablet, Rfl: 1    leflunomide (ARAVA) 20 MG tablet, Take 1 tablet (20 mg total) by mouth daily, Disp: 90 tablet, Rfl: 1    LOTEMAX 0 5 % ophth gel, INSTILL 1 DROP 3 TIMES A DAY INTO THE LEFT EYE , Disp: , Rfl: 1    Omega-3 Fatty Acids (FISH OIL) 1200 MG CAPS, Take 1,200 mg by mouth daily With 360mg omega 3, Disp: , Rfl:     phenytoin (DILANTIN) 100 mg ER capsule, Take by mouth 3 (three) times a day , Disp: , Rfl:     Objective:    Vitals:    12/21/18 0805   BP: 118/70   Pulse: 74   Weight: 42 2 kg (93 lb)   Height: 5' 1" (1 549 m)       Physical Exam   Constitutional: She is oriented to person, place, and time  She appears well-developed and well-nourished  HENT:   Head: Normocephalic  Mouth/Throat: Oropharynx is clear and moist    Eyes: Pupils are equal, round, and reactive to light  Conjunctivae are normal    Neck: Normal range of motion  Neck supple  Cardiovascular: Normal rate, regular rhythm and normal heart sounds  Pulmonary/Chest: Effort normal and breath sounds normal    Musculoskeletal:   +Crepitus of the knees      Neurological: She is alert and oriented to person, place, and time  Skin: Skin is warm and dry  Psychiatric: She has a normal mood and affect   Her behavior is normal      Musculoskeletal--Peripheral Joint Exam:  Physical Exam     Tenderness:   LUE: wrist  Right hand: 2nd PIP, 3rd PIP, 4th PIP and 5th PIP  RLE: tibiotalar    Swelling:   Right hand: 2nd PIP, 3rd PIP, 4th PIP and 5th PIP  RLE: tibiotalar    HURD-28 tender joint count: 5  HURD-28 swollen joint count: 4  CRP (mg/L): 13 4  Patient global assessment: 5  HURD-28 CRP score: 3 8 (Moderate Disease Activity)          Labs:   Component      Latest Ref Rng & Units 10/11/2018   WBC      4 31 - 10 16 Thousand/uL 5 18   Red Blood Cell Count      3 81 - 5 12 Million/uL 4 45   Hemoglobin      11 5 - 15 4 g/dL 11 9   HCT      34 8 - 46 1 % 40 0   MCV      82 - 98 fL 90   MCH      26 8 - 34 3 pg 26 7 (L)   MCHC      31 4 - 37 4 g/dL 29 8 (L)   RDW      11 6 - 15 1 % 13 3   MPV      8 9 - 12 7 fL 9 3   Platelet Count      451 - 390 Thousands/uL 298   nRBC      /100 WBCs 0   Neutrophils %      43 - 75 % 49   Immat GRANS %      0 - 2 % 0   Lymphocytes Relative      14 - 44 % 39   Monocytes Relative      4 - 12 % 9   Eosinophils      0 - 6 % 2   Basophils Relative      0 - 1 % 1   Absolute Neutrophils      1 85 - 7 62 Thousands/µL 2 54   Immature Grans Absolute      0 00 - 0 20 Thousand/uL 0 01   Lymphocytes Absolute      0 60 - 4 47 Thousands/µL 2 02   Absolute Monocytes      0 17 - 1 22 Thousand/µL 0 47   Absolute Eosinophils      0 00 - 0 61 Thousand/µL 0 08   Basophils Absolute      0 00 - 0 10 Thousands/µL 0 06   Sodium      136 - 145 mmol/L 138   Potassium      3 5 - 5 3 mmol/L 4 2   Chloride      100 - 108 mmol/L 106   CO2      21 - 32 mmol/L 28   Anion Gap      4 - 13 mmol/L 4   BUN      5 - 25 mg/dL 16   Creatinine      0 60 - 1 30 mg/dL 0 60   GLUCOSE FASTING      65 - 99 mg/dL 70   Calcium      8 3 - 10 1 mg/dL 9 1   AST      5 - 45 U/L 17   ALT      12 - 78 U/L 22   Alkaline Phosphatase      46 - 116 U/L 202 (H)   Total Protein      6 4 - 8 2 g/dL 7 0   Albumin      3 5 - 5 0 g/dL 3 1 (L)   TOTAL BILIRUBIN      0 20 - 1 00 mg/dL 0 40   eGFR      ml/min/1 73sq m 102   C-REACTIVE PROTEIN      <3 0 mg/L 13 4 (H)   Sed Rate      0 - 20 mm/hour 16

## 2018-12-21 ENCOUNTER — OFFICE VISIT (OUTPATIENT)
Dept: RHEUMATOLOGY | Facility: CLINIC | Age: 58
End: 2018-12-21
Payer: COMMERCIAL

## 2018-12-21 VITALS
HEART RATE: 74 BPM | SYSTOLIC BLOOD PRESSURE: 118 MMHG | WEIGHT: 93 LBS | HEIGHT: 61 IN | DIASTOLIC BLOOD PRESSURE: 70 MMHG | BODY MASS INDEX: 17.56 KG/M2

## 2018-12-21 DIAGNOSIS — M06.09 RHEUMATOID ARTHRITIS OF MULTIPLE SITES WITH NEGATIVE RHEUMATOID FACTOR (HCC): Primary | ICD-10-CM

## 2018-12-21 DIAGNOSIS — M81.0 AGE-RELATED OSTEOPOROSIS WITHOUT CURRENT PATHOLOGICAL FRACTURE: ICD-10-CM

## 2018-12-21 DIAGNOSIS — M06.9 RHEUMATOID ARTHRITIS INVOLVING MULTIPLE SITES, UNSPECIFIED RHEUMATOID FACTOR PRESENCE: ICD-10-CM

## 2018-12-21 DIAGNOSIS — E21.3 HYPERPARATHYROIDISM (HCC): ICD-10-CM

## 2018-12-21 PROCEDURE — 99214 OFFICE O/P EST MOD 30 MIN: CPT | Performed by: PHYSICIAN ASSISTANT

## 2018-12-21 RX ORDER — LEFLUNOMIDE 20 MG/1
20 TABLET ORAL DAILY
Qty: 90 TABLET | Refills: 0 | Status: SHIPPED | OUTPATIENT
Start: 2018-12-21 | End: 2019-03-22 | Stop reason: SDUPTHER

## 2018-12-21 RX ORDER — HYDROXYCHLOROQUINE SULFATE 200 MG/1
200 TABLET, FILM COATED ORAL DAILY
Qty: 90 TABLET | Refills: 3 | Status: SHIPPED | OUTPATIENT
Start: 2018-12-21 | End: 2020-01-22

## 2019-01-03 ENCOUNTER — TELEPHONE (OUTPATIENT)
Dept: ENDOCRINOLOGY | Facility: CLINIC | Age: 59
End: 2019-01-03

## 2019-01-03 ENCOUNTER — TRANSCRIBE ORDERS (OUTPATIENT)
Dept: ADMINISTRATIVE | Facility: HOSPITAL | Age: 59
End: 2019-01-03

## 2019-01-03 ENCOUNTER — APPOINTMENT (OUTPATIENT)
Dept: LAB | Facility: MEDICAL CENTER | Age: 59
End: 2019-01-03
Payer: COMMERCIAL

## 2019-01-03 DIAGNOSIS — M81.0 OSTEOPOROSIS WITHOUT CURRENT PATHOLOGICAL FRACTURE, UNSPECIFIED OSTEOPOROSIS TYPE: ICD-10-CM

## 2019-01-03 DIAGNOSIS — D50.0 IRON DEFICIENCY ANEMIA SECONDARY TO BLOOD LOSS (CHRONIC): Primary | ICD-10-CM

## 2019-01-03 DIAGNOSIS — E04.1 THYROID NODULE: ICD-10-CM

## 2019-01-03 DIAGNOSIS — E21.3 HYPERPARATHYROIDISM (HCC): ICD-10-CM

## 2019-01-03 DIAGNOSIS — D50.0 IRON DEFICIENCY ANEMIA SECONDARY TO BLOOD LOSS (CHRONIC): ICD-10-CM

## 2019-01-03 DIAGNOSIS — E55.9 VITAMIN D DEFICIENCY: ICD-10-CM

## 2019-01-03 LAB
25(OH)D3 SERPL-MCNC: 81.3 NG/ML (ref 30–100)
ALBUMIN SERPL BCP-MCNC: 3.3 G/DL (ref 3.5–5)
ALP SERPL-CCNC: 223 U/L (ref 46–116)
ALT SERPL W P-5'-P-CCNC: 17 U/L (ref 12–78)
ANION GAP SERPL CALCULATED.3IONS-SCNC: 6 MMOL/L (ref 4–13)
AST SERPL W P-5'-P-CCNC: 18 U/L (ref 5–45)
BASOPHILS # BLD AUTO: 0.07 THOUSANDS/ΜL (ref 0–0.1)
BASOPHILS NFR BLD AUTO: 1 % (ref 0–1)
BILIRUB SERPL-MCNC: 0.28 MG/DL (ref 0.2–1)
BUN SERPL-MCNC: 16 MG/DL (ref 5–25)
CALCIUM SERPL-MCNC: 9.5 MG/DL (ref 8.3–10.1)
CHLORIDE SERPL-SCNC: 102 MMOL/L (ref 100–108)
CO2 SERPL-SCNC: 29 MMOL/L (ref 21–32)
CREAT SERPL-MCNC: 0.66 MG/DL (ref 0.6–1.3)
CRP SERPL QL: 17.5 MG/L
EOSINOPHIL # BLD AUTO: 0.1 THOUSAND/ΜL (ref 0–0.61)
EOSINOPHIL NFR BLD AUTO: 2 % (ref 0–6)
ERYTHROCYTE [DISTWIDTH] IN BLOOD BY AUTOMATED COUNT: 13.3 % (ref 11.6–15.1)
ERYTHROCYTE [SEDIMENTATION RATE] IN BLOOD: 14 MM/HOUR (ref 0–20)
FERRITIN SERPL-MCNC: 39 NG/ML (ref 8–388)
GFR SERPL CREATININE-BSD FRML MDRD: 98 ML/MIN/1.73SQ M
GLUCOSE SERPL-MCNC: 79 MG/DL (ref 65–140)
HCT VFR BLD AUTO: 36.8 % (ref 34.8–46.1)
HGB BLD-MCNC: 11.3 G/DL (ref 11.5–15.4)
IMM GRANULOCYTES # BLD AUTO: 0.02 THOUSAND/UL (ref 0–0.2)
IMM GRANULOCYTES NFR BLD AUTO: 0 % (ref 0–2)
IRON SERPL-MCNC: 92 UG/DL (ref 50–170)
LYMPHOCYTES # BLD AUTO: 1.78 THOUSANDS/ΜL (ref 0.6–4.47)
LYMPHOCYTES NFR BLD AUTO: 31 % (ref 14–44)
MCH RBC QN AUTO: 27.1 PG (ref 26.8–34.3)
MCHC RBC AUTO-ENTMCNC: 30.7 G/DL (ref 31.4–37.4)
MCV RBC AUTO: 88 FL (ref 82–98)
MONOCYTES # BLD AUTO: 0.68 THOUSAND/ΜL (ref 0.17–1.22)
MONOCYTES NFR BLD AUTO: 12 % (ref 4–12)
NEUTROPHILS # BLD AUTO: 3.03 THOUSANDS/ΜL (ref 1.85–7.62)
NEUTS SEG NFR BLD AUTO: 54 % (ref 43–75)
NRBC BLD AUTO-RTO: 0 /100 WBCS
PLATELET # BLD AUTO: 275 THOUSANDS/UL (ref 149–390)
PMV BLD AUTO: 9.5 FL (ref 8.9–12.7)
POTASSIUM SERPL-SCNC: 4.1 MMOL/L (ref 3.5–5.3)
PROT SERPL-MCNC: 7.2 G/DL (ref 6.4–8.2)
PTH-INTACT SERPL-MCNC: 56.3 PG/ML (ref 18.4–80.1)
RBC # BLD AUTO: 4.17 MILLION/UL (ref 3.81–5.12)
SODIUM SERPL-SCNC: 137 MMOL/L (ref 136–145)
T4 FREE SERPL-MCNC: 0.76 NG/DL (ref 0.76–1.46)
TIBC SERPL-MCNC: 296 UG/DL (ref 250–450)
TSH SERPL DL<=0.05 MIU/L-ACNC: 0.82 UIU/ML (ref 0.36–3.74)
WBC # BLD AUTO: 5.68 THOUSAND/UL (ref 4.31–10.16)

## 2019-01-03 PROCEDURE — 85025 COMPLETE CBC W/AUTO DIFF WBC: CPT | Performed by: PHYSICIAN ASSISTANT

## 2019-01-03 PROCEDURE — 82728 ASSAY OF FERRITIN: CPT

## 2019-01-03 PROCEDURE — 80053 COMPREHEN METABOLIC PANEL: CPT | Performed by: PHYSICIAN ASSISTANT

## 2019-01-03 PROCEDURE — 85652 RBC SED RATE AUTOMATED: CPT | Performed by: PHYSICIAN ASSISTANT

## 2019-01-03 PROCEDURE — 83540 ASSAY OF IRON: CPT

## 2019-01-03 PROCEDURE — 82306 VITAMIN D 25 HYDROXY: CPT

## 2019-01-03 PROCEDURE — 83970 ASSAY OF PARATHORMONE: CPT

## 2019-01-03 PROCEDURE — 83550 IRON BINDING TEST: CPT

## 2019-01-03 PROCEDURE — 84439 ASSAY OF FREE THYROXINE: CPT

## 2019-01-03 PROCEDURE — 86140 C-REACTIVE PROTEIN: CPT | Performed by: PHYSICIAN ASSISTANT

## 2019-01-03 PROCEDURE — 36415 COLL VENOUS BLD VENIPUNCTURE: CPT | Performed by: PHYSICIAN ASSISTANT

## 2019-01-03 PROCEDURE — 84443 ASSAY THYROID STIM HORMONE: CPT

## 2019-01-03 NOTE — TELEPHONE ENCOUNTER
----- Message from Summer Cabrales MD sent at 1/3/2019 12:53 PM EST -----  Please inform pt that thyroid blood work is with in normal limit, vitamin D is normal, continue current dose of vitamin D supplementation

## 2019-01-05 ENCOUNTER — HOSPITAL ENCOUNTER (OUTPATIENT)
Dept: RADIOLOGY | Age: 59
Discharge: HOME | End: 2019-01-05
Payer: COMMERCIAL

## 2019-01-05 DIAGNOSIS — M81.0 OSTEOPOROSIS WITHOUT CURRENT PATHOLOGICAL FRACTURE, UNSPECIFIED OSTEOPOROSIS TYPE: ICD-10-CM

## 2019-01-05 PROCEDURE — 77080 DXA BONE DENSITY AXIAL: CPT

## 2019-01-07 ENCOUNTER — TELEPHONE (OUTPATIENT)
Dept: ENDOCRINOLOGY | Facility: CLINIC | Age: 59
End: 2019-01-07

## 2019-01-07 NOTE — TELEPHONE ENCOUNTER
----- Message from Margot Taylor MD sent at 1/7/2019 12:34 PM EST -----  Please call the patient regarding her abnormal result  Please inform patient that bone density showed osteoporosis, it did not worsen from last DEXA scan, which is assuring, she will need repeat bone density test in 2 years    Continue current management for osteoporosis

## 2019-01-08 ENCOUNTER — TELEPHONE (OUTPATIENT)
Dept: OTHER | Facility: HOSPITAL | Age: 59
End: 2019-01-08

## 2019-01-08 NOTE — TELEPHONE ENCOUNTER
Please inform patient that vitamin-D is normal, continue current dose of vitamin-D supplementation  Thyroid blood work is normal    Sofia Meyer MD

## 2019-03-19 NOTE — PROGRESS NOTES
Assessment and Plan: This is a 55-year-old female presenting today for follow-up for seronegative rheumatoid arthritis currently utilizing leflunomide 20 mg daily with hydroxychloroquine 200 mg daily  The patient continues to report ongoing joint pain of the left wrist and right ankle with associated swelling  She has had surgery of the left wrist and left thumb joint in the past to repair and stabilize these joints from damage of RA per the patient  Her exam today does reveal synovitis of the left wrist with restricted ROM of that joint, as well as the right ankle synovitis and increased warmth  Patient's RA appears to be active and not well controlled with the use of leflunomide and hydroxychloroquine  Her HURD 28-CRP score is consistent with exam today and revealing moderate disease activity  Patient had been sent for imaging of the right ankle at the last appointment however this is scheduled for next Monday  Due to her activity on exam, we did discuss starting biologic therapy, specifically anti TNF therapy to control her inflammatory arthritis  She was advised to obtain blood work including a QuantiFERON TB gold  She was provided with information regarding Enbrel and Humira and these medications were discussed at length with her  She will proceed with imaging this Monday and read over the ACR handout she was provided with at today's visit  If patient would like to proceed with treatment, she will contact the office and we will begin prior authorization  For now, she will continue with leflunomide and hydroxychloroquine  She did have a recent eye exam for drug monitoring of hydroxychloroquine and will plan for blood work in early April  She will return to the office in 3 months time but she will contact the office to proceed with biologic therapy        Plan:  Diagnoses and all orders for this visit:    Rheumatoid arthritis of multiple sites with negative rheumatoid factor (HCC)  -     CBC and differential  -     Comprehensive metabolic panel  -     C-reactive protein  -     Sedimentation rate, automated  -     Quantiferon TB Gold Plus; Future    Age-related osteoporosis without current pathological fracture  -     CBC and differential  -     Comprehensive metabolic panel  -     C-reactive protein  -     Sedimentation rate, automated  -     Quantiferon TB Gold Plus; Future    Long-term use of immunosuppressant medication        Follow-up plan: F/U with me in 3 months      Rheumatic Disease Summary:  1  Seronegative RA  Established care- June, 2015- for left wrist swelling and pain with synovitis confirmed on MR  Dx with seronegative RA by Dr Farida Costa   -Started on Leflunomide due to inability to use MTX with phenytoin  -December, 2015- added HCQ to Leflunomide      2  OP  Found to have hyperparathyroidism and severe OP  Placed on Prolia by Endo and since transitioned to Fosamax( mid 2018)    3  Drug Monitoring     HPI  Abena Mccormick is a 63 y o   female who presents today for follow up for seronegative RA  Swelling and discomfort in the right ankle with rainy weather  Also with right shoulder discomfort and following with ortho for labral tear and completing PT for this  No other joint complaints  No AM stiffness  +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  +Some fatigue  Taking Leflunomide and HCQ  No other OTC medication for joint pain  Recent eye exam    History of the left thumb and wrist surgery for RA deformities  The following portions of the patient's history were reviewed and updated as appropriate: allergies, current medications, past family history, past medical history, past social history, past surgical history and problem list     Review of Systems:   Review of Systems   Constitutional: Positive for fatigue  Negative for appetite change, chills, fever and unexpected weight change  HENT: Negative for congestion, mouth sores and sore throat           No recent infxn Eyes: Negative for pain, redness and visual disturbance  No dry eyes or dry mouth    Respiratory: Positive for cough  Negative for chest tightness and shortness of breath  Cardiovascular: Negative for chest pain and leg swelling  Gastrointestinal: Negative for abdominal pain, blood in stool, constipation, diarrhea, nausea and vomiting  Endocrine: Negative for polydipsia and polyuria  Genitourinary: Negative for frequency and hematuria  Skin: Negative for color change and rash  No hair loss or nail changes  No Raynauds  Neurological: Positive for headaches (related to sinuses  )  Negative for weakness, light-headedness and numbness  Hematological: Negative for adenopathy  Psychiatric/Behavioral: Negative for behavioral problems  The patient is not nervous/anxious  Home Medications:     Current Outpatient Medications:     alendronate (FOSAMAX) 70 mg tablet, Take 1 tablet (70 mg total) by mouth every 7 days WITH full glass of water, on empty stomach, stay upright for half an hour, once a week, Disp: 12 tablet, Rfl: 1    Cholecalciferol (VITAMIN D PO), Take 5,000 Units by mouth daily  , Disp: , Rfl:     hydroxychloroquine (PLAQUENIL) 200 mg tablet, Take 1 tablet (200 mg total) by mouth daily, Disp: 90 tablet, Rfl: 3    leflunomide (ARAVA) 20 MG tablet, Take 1 tablet (20 mg total) by mouth daily, Disp: 90 tablet, Rfl: 0    LOTEMAX 0 5 % ophth gel, INSTILL 1 DROP 3 TIMES A DAY INTO THE LEFT EYE , Disp: , Rfl: 1    Omega-3 Fatty Acids (FISH OIL) 1200 MG CAPS, Take 1,200 mg by mouth daily With 360mg omega 3, Disp: , Rfl:     phenytoin (DILANTIN) 100 mg ER capsule, Take by mouth 3 (three) times a day , Disp: , Rfl:     Objective:    Vitals:    03/22/19 0921   Pulse: 70   Weight: 42 2 kg (93 lb)   Height: 5' 1" (1 549 m)       Physical Exam   Constitutional: She is oriented to person, place, and time  She appears well-developed and well-nourished  HENT:   Head: Normocephalic  Mouth/Throat: Oropharynx is clear and moist    Eyes: Pupils are equal, round, and reactive to light  Conjunctivae are normal    Neck: Normal range of motion  Neck supple  Cardiovascular: Normal rate, regular rhythm and normal heart sounds  Pulmonary/Chest: Effort normal and breath sounds normal    Musculoskeletal:   +Restricted ROM of the left wrist  +Restricted ROM of the right shoulder  +Crepitus of the knees  Increased warmth with tenderness and swelling of the right ankle  Neurological: She is alert and oriented to person, place, and time  Skin: Skin is warm and dry  Psychiatric: She has a normal mood and affect  Her behavior is normal      Musculoskeletal--Peripheral Joint Exam:  Physical Exam     Tenderness:   RUE: glenohumeral  LUE: wrist  RLE: tibiotalar    Swelling:   LUE: wrist  RLE: tibiotalar    HURD-28 tender joint count: 2  HURD-28 swollen joint count: 1  CRP (mg/L): 17  Patient global assessment: 50  HURD-28 CRP score: 3 77 (Moderate Disease Activity)          Imaging:   DEXA Scan 1/5/2019  IMPRESSION:  1  Based on the [facility] classification, the T-score of -3 7 in the left hip is consistent with OSTEOPOROSIS  2  When compared to the prior examination, there has been NO SIGNIFICANT CHANGE in the total bone mineral density of the lumbar spine or hip, when allowing for the least significant change  3   According to the [address] [place] [address], prescription therapy is recommended with a T-score of -2 5 or less in the spine or hip after appropriate evaluation to exclude secondary causes      RESULTS:   LUMBAR SPINE:  L1-L4:  BMD 0 697 gm/cm2  T-score -3 2  Z-score -1 9     LEFT TOTAL HIP:  BMD 0 570 gm/cm2  T-score -3 0  Z-score -2 2     LEFT FEMORAL NECK:  BMD 0 440 gm/cm2  T-score -3 7  Z-score -2 5         Labs:   Component      Latest Ref Rng & Units 1/3/2019   WBC      4 31 - 10 16 Thousand/uL 5 68   Red Blood Cell Count      3 81 - 5 12 Million/uL 4 17   Hemoglobin      11 5 - 15 4 g/dL 11 3 (L)   HCT      34 8 - 46 1 % 36 8   MCV      82 - 98 fL 88   MCH      26 8 - 34 3 pg 27 1   MCHC      31 4 - 37 4 g/dL 30 7 (L)   RDW      11 6 - 15 1 % 13 3   MPV      8 9 - 12 7 fL 9 5   Platelet Count      958 - 390 Thousands/uL 275   nRBC      /100 WBCs 0   Neutrophils %      43 - 75 % 54   Immat GRANS %      0 - 2 % 0   Lymphocytes Relative      14 - 44 % 31   Monocytes Relative      4 - 12 % 12   Eosinophils      0 - 6 % 2   Basophils Relative      0 - 1 % 1   Absolute Neutrophils      1 85 - 7 62 Thousands/µL 3 03   Immature Grans Absolute      0 00 - 0 20 Thousand/uL 0 02   Lymphocytes Absolute      0 60 - 4 47 Thousands/µL 1 78   Absolute Monocytes      0 17 - 1 22 Thousand/µL 0 68   Absolute Eosinophils      0 00 - 0 61 Thousand/µL 0 10   Basophils Absolute      0 00 - 0 10 Thousands/µL 0 07   Sodium      136 - 145 mmol/L 137   Potassium      3 5 - 5 3 mmol/L 4 1   Chloride      100 - 108 mmol/L 102   CO2      21 - 32 mmol/L 29   Anion Gap      4 - 13 mmol/L 6   BUN      5 - 25 mg/dL 16   Creatinine      0 60 - 1 30 mg/dL 0 66   Glucose, Random      65 - 140 mg/dL 79   Calcium      8 3 - 10 1 mg/dL 9 5   AST      5 - 45 U/L 18   ALT      12 - 78 U/L 17   Alkaline Phosphatase      46 - 116 U/L 223 (H)   Total Protein      6 4 - 8 2 g/dL 7 2   Albumin      3 5 - 5 0 g/dL 3 3 (L)   TOTAL BILIRUBIN      0 20 - 1 00 mg/dL 0 28   eGFR      ml/min/1 73sq m 98   C-REACTIVE PROTEIN      <3 0 mg/L 17 5 (H)   Sed Rate      0 - 20 mm/hour 14   Vit D, 25-Hydroxy      30 0 - 100 0 ng/mL 81 3

## 2019-03-22 ENCOUNTER — OFFICE VISIT (OUTPATIENT)
Dept: RHEUMATOLOGY | Facility: CLINIC | Age: 59
End: 2019-03-22
Payer: COMMERCIAL

## 2019-03-22 VITALS — BODY MASS INDEX: 17.56 KG/M2 | HEART RATE: 70 BPM | HEIGHT: 61 IN | WEIGHT: 93 LBS

## 2019-03-22 DIAGNOSIS — M06.09 RHEUMATOID ARTHRITIS OF MULTIPLE SITES WITH NEGATIVE RHEUMATOID FACTOR (HCC): Primary | ICD-10-CM

## 2019-03-22 DIAGNOSIS — Z79.899 LONG-TERM USE OF IMMUNOSUPPRESSANT MEDICATION: ICD-10-CM

## 2019-03-22 DIAGNOSIS — M81.0 AGE-RELATED OSTEOPOROSIS WITHOUT CURRENT PATHOLOGICAL FRACTURE: ICD-10-CM

## 2019-03-22 DIAGNOSIS — M06.9 RHEUMATOID ARTHRITIS INVOLVING MULTIPLE SITES, UNSPECIFIED RHEUMATOID FACTOR PRESENCE: ICD-10-CM

## 2019-03-22 PROCEDURE — 99214 OFFICE O/P EST MOD 30 MIN: CPT | Performed by: PHYSICIAN ASSISTANT

## 2019-03-22 RX ORDER — LEFLUNOMIDE 20 MG/1
TABLET ORAL
Qty: 90 TABLET | Refills: 0 | Status: SHIPPED | OUTPATIENT
Start: 2019-03-22 | End: 2019-06-20 | Stop reason: SDUPTHER

## 2019-03-25 ENCOUNTER — HOSPITAL ENCOUNTER (OUTPATIENT)
Dept: RADIOLOGY | Facility: HOSPITAL | Age: 59
Discharge: HOME | End: 2019-03-25
Payer: COMMERCIAL

## 2019-03-25 ENCOUNTER — TRANSCRIBE ORDERS (OUTPATIENT)
Dept: RADIOLOGY | Facility: HOSPITAL | Age: 59
End: 2019-03-25

## 2019-03-25 ENCOUNTER — TELEPHONE (OUTPATIENT)
Dept: RHEUMATOLOGY | Facility: CLINIC | Age: 59
End: 2019-03-25

## 2019-03-25 DIAGNOSIS — M81.0 AGE-RELATED OSTEOPOROSIS WITHOUT CURRENT PATHOLOGICAL FRACTURE: ICD-10-CM

## 2019-03-25 DIAGNOSIS — M06.09 RHEUMATOID ARTHRITIS OF MULTIPLE SITES WITH NEGATIVE RHEUMATOID FACTOR (HCC): ICD-10-CM

## 2019-03-25 PROCEDURE — 76881 US COMPL JOINT R-T W/IMG: CPT

## 2019-03-25 PROCEDURE — 73610 X-RAY EXAM OF ANKLE: CPT

## 2019-03-25 NOTE — TELEPHONE ENCOUNTER
I called patient and left message regarding her MSK US result and I asked her to call us back to discussed starting biologic therapy

## 2019-03-26 NOTE — TELEPHONE ENCOUNTER
Spoke with patient regarding her MSK US result and I did reiterate the need to advance treatment with biologic therapy to control ongoing inflammation  I did review risks and benefits of these medications again on the phone however patient does not want to proceed with treatment at this time  I told her that if she has further questions or decides she would like to proceed with treatment to contact our office

## 2019-06-20 DIAGNOSIS — M06.9 RHEUMATOID ARTHRITIS INVOLVING MULTIPLE SITES, UNSPECIFIED RHEUMATOID FACTOR PRESENCE: ICD-10-CM

## 2019-06-20 RX ORDER — LEFLUNOMIDE 20 MG/1
TABLET ORAL
Qty: 90 TABLET | Refills: 0 | Status: SHIPPED | OUTPATIENT
Start: 2019-06-20

## 2019-06-27 ENCOUNTER — TRANSCRIBE ORDERS (OUTPATIENT)
Dept: ADMINISTRATIVE | Facility: HOSPITAL | Age: 59
End: 2019-06-27

## 2019-06-27 ENCOUNTER — APPOINTMENT (OUTPATIENT)
Dept: LAB | Facility: MEDICAL CENTER | Age: 59
End: 2019-06-27
Payer: COMMERCIAL

## 2019-06-27 ENCOUNTER — TELEPHONE (OUTPATIENT)
Dept: ENDOCRINOLOGY | Facility: CLINIC | Age: 59
End: 2019-06-27

## 2019-06-27 DIAGNOSIS — M81.0 AGE-RELATED OSTEOPOROSIS WITHOUT CURRENT PATHOLOGICAL FRACTURE: ICD-10-CM

## 2019-06-27 DIAGNOSIS — M81.0 OSTEOPOROSIS WITHOUT CURRENT PATHOLOGICAL FRACTURE, UNSPECIFIED OSTEOPOROSIS TYPE: ICD-10-CM

## 2019-06-27 DIAGNOSIS — M06.09 RHEUMATOID ARTHRITIS OF MULTIPLE SITES WITH NEGATIVE RHEUMATOID FACTOR (HCC): ICD-10-CM

## 2019-06-27 DIAGNOSIS — E55.9 VITAMIN D DEFICIENCY: Primary | ICD-10-CM

## 2019-06-27 DIAGNOSIS — E55.9 VITAMIN D DEFICIENCY: ICD-10-CM

## 2019-06-27 LAB
25(OH)D3 SERPL-MCNC: 97.2 NG/ML (ref 30–100)
ALBUMIN SERPL BCP-MCNC: 3.3 G/DL (ref 3.5–5)
ALP SERPL-CCNC: 209 U/L (ref 46–116)
ALT SERPL W P-5'-P-CCNC: 18 U/L (ref 12–78)
ANION GAP SERPL CALCULATED.3IONS-SCNC: 2 MMOL/L (ref 4–13)
AST SERPL W P-5'-P-CCNC: 15 U/L (ref 5–45)
BASOPHILS # BLD AUTO: 0.06 THOUSANDS/ΜL (ref 0–0.1)
BASOPHILS NFR BLD AUTO: 2 % (ref 0–1)
BILIRUB SERPL-MCNC: 0.31 MG/DL (ref 0.2–1)
BUN SERPL-MCNC: 12 MG/DL (ref 5–25)
CALCIUM SERPL-MCNC: 9.1 MG/DL (ref 8.3–10.1)
CHLORIDE SERPL-SCNC: 108 MMOL/L (ref 100–108)
CO2 SERPL-SCNC: 29 MMOL/L (ref 21–32)
CREAT SERPL-MCNC: 0.56 MG/DL (ref 0.6–1.3)
CRP SERPL QL: 14.4 MG/L
EOSINOPHIL # BLD AUTO: 0.09 THOUSAND/ΜL (ref 0–0.61)
EOSINOPHIL NFR BLD AUTO: 2 % (ref 0–6)
ERYTHROCYTE [DISTWIDTH] IN BLOOD BY AUTOMATED COUNT: 13.9 % (ref 11.6–15.1)
ERYTHROCYTE [SEDIMENTATION RATE] IN BLOOD: 14 MM/HOUR (ref 0–20)
GFR SERPL CREATININE-BSD FRML MDRD: 103 ML/MIN/1.73SQ M
GLUCOSE P FAST SERPL-MCNC: 71 MG/DL (ref 65–99)
HCT VFR BLD AUTO: 37.3 % (ref 34.8–46.1)
HGB BLD-MCNC: 11.3 G/DL (ref 11.5–15.4)
IMM GRANULOCYTES # BLD AUTO: 0.01 THOUSAND/UL (ref 0–0.2)
IMM GRANULOCYTES NFR BLD AUTO: 0 % (ref 0–2)
LYMPHOCYTES # BLD AUTO: 1.66 THOUSANDS/ΜL (ref 0.6–4.47)
LYMPHOCYTES NFR BLD AUTO: 41 % (ref 14–44)
MCH RBC QN AUTO: 27 PG (ref 26.8–34.3)
MCHC RBC AUTO-ENTMCNC: 30.3 G/DL (ref 31.4–37.4)
MCV RBC AUTO: 89 FL (ref 82–98)
MONOCYTES # BLD AUTO: 0.44 THOUSAND/ΜL (ref 0.17–1.22)
MONOCYTES NFR BLD AUTO: 11 % (ref 4–12)
NEUTROPHILS # BLD AUTO: 1.78 THOUSANDS/ΜL (ref 1.85–7.62)
NEUTS SEG NFR BLD AUTO: 44 % (ref 43–75)
NRBC BLD AUTO-RTO: 0 /100 WBCS
PLATELET # BLD AUTO: 270 THOUSANDS/UL (ref 149–390)
PMV BLD AUTO: 9.4 FL (ref 8.9–12.7)
POTASSIUM SERPL-SCNC: 3.8 MMOL/L (ref 3.5–5.3)
PROT SERPL-MCNC: 7.2 G/DL (ref 6.4–8.2)
RBC # BLD AUTO: 4.19 MILLION/UL (ref 3.81–5.12)
SODIUM SERPL-SCNC: 139 MMOL/L (ref 136–145)
WBC # BLD AUTO: 4.04 THOUSAND/UL (ref 4.31–10.16)

## 2019-06-27 PROCEDURE — 80053 COMPREHEN METABOLIC PANEL: CPT | Performed by: PHYSICIAN ASSISTANT

## 2019-06-27 PROCEDURE — 86480 TB TEST CELL IMMUN MEASURE: CPT

## 2019-06-27 PROCEDURE — 36415 COLL VENOUS BLD VENIPUNCTURE: CPT | Performed by: PHYSICIAN ASSISTANT

## 2019-06-27 PROCEDURE — 82306 VITAMIN D 25 HYDROXY: CPT

## 2019-06-27 PROCEDURE — 86140 C-REACTIVE PROTEIN: CPT | Performed by: PHYSICIAN ASSISTANT

## 2019-06-27 PROCEDURE — 85652 RBC SED RATE AUTOMATED: CPT | Performed by: PHYSICIAN ASSISTANT

## 2019-06-27 PROCEDURE — 85025 COMPLETE CBC W/AUTO DIFF WBC: CPT | Performed by: PHYSICIAN ASSISTANT

## 2019-06-28 ENCOUNTER — OFFICE VISIT (OUTPATIENT)
Dept: ENDOCRINOLOGY | Facility: CLINIC | Age: 59
End: 2019-06-28
Payer: COMMERCIAL

## 2019-06-28 VITALS
SYSTOLIC BLOOD PRESSURE: 118 MMHG | WEIGHT: 92.6 LBS | BODY MASS INDEX: 17.48 KG/M2 | DIASTOLIC BLOOD PRESSURE: 70 MMHG | HEIGHT: 61 IN | HEART RATE: 72 BPM

## 2019-06-28 DIAGNOSIS — M81.0 OSTEOPOROSIS WITHOUT CURRENT PATHOLOGICAL FRACTURE, UNSPECIFIED OSTEOPOROSIS TYPE: Primary | ICD-10-CM

## 2019-06-28 DIAGNOSIS — E55.9 VITAMIN D DEFICIENCY: ICD-10-CM

## 2019-06-28 DIAGNOSIS — E04.2 MULTINODULAR GOITER: ICD-10-CM

## 2019-06-28 DIAGNOSIS — M81.0 AGE-RELATED OSTEOPOROSIS WITHOUT CURRENT PATHOLOGICAL FRACTURE: ICD-10-CM

## 2019-06-28 DIAGNOSIS — R74.8 ELEVATED ALKALINE PHOSPHATASE LEVEL: ICD-10-CM

## 2019-06-28 PROCEDURE — 99214 OFFICE O/P EST MOD 30 MIN: CPT | Performed by: INTERNAL MEDICINE

## 2019-06-28 RX ORDER — ALENDRONATE SODIUM 70 MG/1
70 TABLET ORAL
Qty: 12 TABLET | Refills: 1 | Status: SHIPPED | OUTPATIENT
Start: 2019-06-28 | End: 2019-12-17 | Stop reason: SDUPTHER

## 2019-06-28 RX ORDER — ALENDRONATE SODIUM 70 MG/1
70 TABLET ORAL
Qty: 12 TABLET | Refills: 1 | Status: SHIPPED | OUTPATIENT
Start: 2019-06-28 | End: 2019-06-28 | Stop reason: SDUPTHER

## 2019-06-29 ENCOUNTER — HOSPITAL ENCOUNTER (OUTPATIENT)
Dept: ULTRASOUND IMAGING | Facility: HOSPITAL | Age: 59
Discharge: HOME | End: 2019-06-29
Attending: INTERNAL MEDICINE
Payer: COMMERCIAL

## 2019-06-29 DIAGNOSIS — E04.2 MULTIPLE THYROID NODULES: ICD-10-CM

## 2019-06-29 PROCEDURE — 76536 US EXAM OF HEAD AND NECK: CPT

## 2019-07-01 LAB
GAMMA INTERFERON BACKGROUND BLD IA-ACNC: 0.03 IU/ML
M TB IFN-G BLD-IMP: NEGATIVE
M TB IFN-G CD4+ BCKGRND COR BLD-ACNC: 0 IU/ML
M TB IFN-G CD4+ BCKGRND COR BLD-ACNC: 0.03 IU/ML
MITOGEN IGNF BCKGRD COR BLD-ACNC: >10 IU/ML

## 2019-07-02 ENCOUNTER — TELEPHONE (OUTPATIENT)
Dept: ENDOCRINOLOGY | Facility: CLINIC | Age: 59
End: 2019-07-02

## 2019-07-02 NOTE — TELEPHONE ENCOUNTER
----- Message from Milind Addison MD sent at 7/2/2019 12:28 PM EDT -----  Please call the patient regarding her thyroid ultrasound results, please inform patient that thyroid nodules are stable which is assuring, she will need ultrasound again in 1 year for follow-up

## 2019-09-18 DIAGNOSIS — M06.9 RHEUMATOID ARTHRITIS INVOLVING MULTIPLE SITES, UNSPECIFIED RHEUMATOID FACTOR PRESENCE: ICD-10-CM

## 2019-09-20 RX ORDER — LEFLUNOMIDE 20 MG/1
TABLET ORAL
Qty: 90 TABLET | Refills: 4 | OUTPATIENT
Start: 2019-09-20

## 2019-10-17 ENCOUNTER — TRANSCRIBE ORDERS (OUTPATIENT)
Dept: ADMINISTRATIVE | Facility: HOSPITAL | Age: 59
End: 2019-10-17

## 2019-10-17 DIAGNOSIS — Z72.0 CURRENT TOBACCO USE: Primary | ICD-10-CM

## 2019-10-23 ENCOUNTER — HOSPITAL ENCOUNTER (OUTPATIENT)
Dept: CT IMAGING | Facility: HOSPITAL | Age: 59
Discharge: HOME | End: 2019-10-23
Attending: INTERNAL MEDICINE
Payer: COMMERCIAL

## 2019-10-23 DIAGNOSIS — Z72.0 CURRENT TOBACCO USE: ICD-10-CM

## 2019-10-23 PROCEDURE — 71250 CT THORAX DX C-: CPT

## 2019-10-25 ENCOUNTER — TRANSCRIBE ORDERS (OUTPATIENT)
Dept: ADMINISTRATIVE | Facility: HOSPITAL | Age: 59
End: 2019-10-25

## 2019-10-25 DIAGNOSIS — IMO0001 LIVER REGENERATION: Primary | ICD-10-CM

## 2019-11-08 ENCOUNTER — HOSPITAL ENCOUNTER (OUTPATIENT)
Dept: ULTRASOUND IMAGING | Facility: HOSPITAL | Age: 59
Discharge: HOME | End: 2019-11-08
Attending: INTERNAL MEDICINE
Payer: COMMERCIAL

## 2019-11-08 DIAGNOSIS — IMO0001 LIVER REGENERATION: ICD-10-CM

## 2019-11-08 PROCEDURE — 76700 US EXAM ABDOM COMPLETE: CPT

## 2019-12-16 DIAGNOSIS — M06.9 RHEUMATOID ARTHRITIS INVOLVING MULTIPLE SITES, UNSPECIFIED RHEUMATOID FACTOR PRESENCE: ICD-10-CM

## 2019-12-16 RX ORDER — HYDROXYCHLOROQUINE SULFATE 200 MG/1
TABLET, FILM COATED ORAL
Qty: 90 TABLET | Refills: 4 | OUTPATIENT
Start: 2019-12-16

## 2019-12-17 DIAGNOSIS — M81.0 OSTEOPOROSIS WITHOUT CURRENT PATHOLOGICAL FRACTURE, UNSPECIFIED OSTEOPOROSIS TYPE: ICD-10-CM

## 2019-12-17 RX ORDER — ALENDRONATE SODIUM 70 MG/1
TABLET ORAL
Qty: 12 TABLET | Refills: 4 | Status: SHIPPED | OUTPATIENT
Start: 2019-12-17 | End: 2021-02-09

## 2019-12-27 ENCOUNTER — LAB (OUTPATIENT)
Dept: LAB | Facility: MEDICAL CENTER | Age: 59
End: 2019-12-27
Payer: COMMERCIAL

## 2019-12-27 ENCOUNTER — TELEPHONE (OUTPATIENT)
Dept: ENDOCRINOLOGY | Facility: CLINIC | Age: 59
End: 2019-12-27

## 2019-12-27 DIAGNOSIS — E55.9 VITAMIN D DEFICIENCY: ICD-10-CM

## 2019-12-27 DIAGNOSIS — M81.0 OSTEOPOROSIS WITHOUT CURRENT PATHOLOGICAL FRACTURE, UNSPECIFIED OSTEOPOROSIS TYPE: ICD-10-CM

## 2019-12-27 LAB
25(OH)D3 SERPL-MCNC: 58.2 NG/ML (ref 30–100)
ANION GAP SERPL CALCULATED.3IONS-SCNC: 1 MMOL/L (ref 4–13)
BUN SERPL-MCNC: 17 MG/DL (ref 5–25)
CALCIUM SERPL-MCNC: 9.8 MG/DL (ref 8.3–10.1)
CHLORIDE SERPL-SCNC: 106 MMOL/L (ref 100–108)
CO2 SERPL-SCNC: 31 MMOL/L (ref 21–32)
CREAT SERPL-MCNC: 0.64 MG/DL (ref 0.6–1.3)
GFR SERPL CREATININE-BSD FRML MDRD: 99 ML/MIN/1.73SQ M
GLUCOSE SERPL-MCNC: 78 MG/DL (ref 65–140)
POTASSIUM SERPL-SCNC: 4.3 MMOL/L (ref 3.5–5.3)
SODIUM SERPL-SCNC: 138 MMOL/L (ref 136–145)

## 2019-12-27 PROCEDURE — 80048 BASIC METABOLIC PNL TOTAL CA: CPT

## 2019-12-27 PROCEDURE — 36415 COLL VENOUS BLD VENIPUNCTURE: CPT

## 2019-12-27 PROCEDURE — 82306 VITAMIN D 25 HYDROXY: CPT

## 2020-01-22 ENCOUNTER — OFFICE VISIT (OUTPATIENT)
Dept: ENDOCRINOLOGY | Facility: CLINIC | Age: 60
End: 2020-01-22
Payer: COMMERCIAL

## 2020-01-22 VITALS
BODY MASS INDEX: 18.09 KG/M2 | DIASTOLIC BLOOD PRESSURE: 58 MMHG | WEIGHT: 95.8 LBS | SYSTOLIC BLOOD PRESSURE: 106 MMHG | HEART RATE: 74 BPM | HEIGHT: 61 IN

## 2020-01-22 DIAGNOSIS — E04.2 MULTINODULAR GOITER: ICD-10-CM

## 2020-01-22 DIAGNOSIS — E55.9 VITAMIN D DEFICIENCY: ICD-10-CM

## 2020-01-22 DIAGNOSIS — E21.3 HYPERPARATHYROIDISM (HCC): Primary | ICD-10-CM

## 2020-01-22 DIAGNOSIS — M81.0 OSTEOPOROSIS WITHOUT CURRENT PATHOLOGICAL FRACTURE, UNSPECIFIED OSTEOPOROSIS TYPE: ICD-10-CM

## 2020-01-22 PROCEDURE — 99214 OFFICE O/P EST MOD 30 MIN: CPT | Performed by: INTERNAL MEDICINE

## 2020-01-22 RX ORDER — MULTIVIT-MIN/IRON/FOLIC ACID/K 18-600-40
CAPSULE ORAL
Start: 2020-01-22

## 2020-01-22 NOTE — PROGRESS NOTES
Maria T Nails 63 y o  female MRN: [id number]    Encounter: [id number]      Assessment/Plan     Assessment: This is a 63y o -year-old female with following medical condition     Plan:  Diagnoses and all orders for this visit:    Hyperparathyroidism [facility])  This is resolved after parathyroid adenoma surgery  Since then calcium and PTH has been in normal range, last PTH 56 3  Lab Results   Component Value Date    CALCIUM 9 8 12/27/2019    PHOS 3 0 11/25/2017     -     Cholecalciferol (VITAMIN D) 50 MCG (2000 UT) CAPS; Take one capsule every other day  -     Basic metabolic panel; Future    Osteoporosis without current pathological fracture, unspecified osteoporosis type  DEXA scan from January 2019 showed T-score at lumbar spine-3 2, T-score at left total hip -3 0, T-score at left femoral neck -3 7 consistent with osteoporosis  Currently she is doing well with the Fosamax, 70 mg once a week  She admits sometimes missing doses, discussed importance of taking medication regularly  Will repeat DEXA scan again in 1 year she is due for next DEXA scan in January 2021  Discussed importance of weight-bearing exercises and fall prevention  Continue vitamin-D supplementation and calcium supplementation, 500 mg daily    -     Cholecalciferol (VITAMIN D) 50 MCG (2000 UT) CAPS; Take one capsule every other day  -     DXA bone density spine hip and pelvis; Future  -     DXA forearm wrist and heel; Future    Vitamin D deficiency  Most recent vitamin-D is 58 2  She is currently taking vitamin D3, 1000 International Units every other day, her dose was reduced because of elevated vitamin-D level  -     Vitamin D 25 hydroxy; Future    Multinodular goiter  Most recent thyroid ultrasound from June 2019 showed stable thyroid nodules which do not meet ultrasound criteria for fine-needle aspiration    Previously biopsied nodule in left mid gland has been shown benign results and stable for 3 5 years recommend thyroid ultrasound in 24 months to confirm stability  She will need next ultrasound for follow-up in June 2021  -     T4, free; Future  -     TSH, 3rd generation; Future          CC:   Multinodular goiter, osteoporosis vitamin-D deficiency    History of Present Illness     HPI:  50-year-old woman with past medical history of primary hyperparathyroidism status post minimally invasive parathyroid adenoma surgery in 2016 with normalization of calcium, osteoporosis vitamin-D deficiency, multiple thyroid nodules is here for follow-up    She has history of osteoporosis which was diagnosed in 2016 based on T-score of lumbar spine-3 2, left total hip T-score-3 2, left femoral neck T-score -3 8 consistent with osteoporosis  She was started on Prolia injection every 6 months, she received 3 injections after that Prolia was denied by [employer], and she was started on bisphosphonates Fosamax 70 mg once a week  She is tolerating Fosamax very well  She is taking vitamin-D 3 supplementation 2000 International Units every other day  She denies history of gastroesophageal reflux disease or hiatal hernia or history of gastritis      She also has history of elevated alkaline phosphatase for which she had a bone scan and did not show any evidence of metastasis or bony lesions  She also saw Gastroenterology for elevated alkaline phosphatase  She has multinodular goiter and had fine-needle aspiration of left upper pole middle pole and lower pole nodule in 2016, which showed FL U S , repeat fine-needle aspiration by Nick was benign      She denies difficulty in swallowing, she has positive family history of thyroid cancer  She denies history of radiation to the neck chest or head area  She has history of metatarsal stress fracture    Component      Latest Ref Rng & Units 1/3/2019 6/27/2019 12/27/2019   Chloride      100 - 108 mmol/L   106   CO2      21 - 32 mmol/L   31   Anion Gap      4 - 13 mmol/L   1 (L)   BUN      5 - 25 mg/dL   17   Creatinine 0 60 - 1 30 mg/dL   0 64   Glucose, Random      65 - 140 mg/dL   78   Calcium      8 3 - 10 1 mg/dL   9 8   eGFR      ml/min/1 73sq m   99   TSH 3RD GENERATON      0 358 - 3 740 uIU/mL 0 824     Free T4      0 76 - 1 46 ng/dL 0 76     Sed Rate      0 - 20 mm/hour  14    Vit D, 25-Hydroxy      30 0 - 100 0 ng/mL  97 2 58 2     Review of Systems   Constitutional: Negative for activity change, diaphoresis, fatigue, fever and unexpected weight change  HENT: Negative  Eyes: Negative for visual disturbance  Respiratory: Negative for cough, chest tightness and shortness of breath  Cardiovascular: Negative for chest pain, palpitations and leg swelling  Gastrointestinal: Negative for abdominal pain, blood in stool, constipation, diarrhea, nausea and vomiting  Endocrine: Negative for cold intolerance, heat intolerance, polydipsia, polyphagia and polyuria  Genitourinary: Negative for dysuria, enuresis, frequency and urgency  Musculoskeletal: Negative for arthralgias and myalgias  Skin: Negative for pallor, rash and wound  Allergic/Immunologic: Negative  Neurological: Negative for dizziness, tremors, weakness and numbness  Hematological: Negative  Psychiatric/Behavioral: Negative          Historical Information   Past Medical History:   Diagnosis Date    Arthritis     [facility]roidism [facility] [address] )     Osteoporosis     Pollen allergy     [facility]      Past Surgical History:   Procedure Laterality Date    BUNIONECTOMY      PARATHYROID GLAND SURGERY      DC EXPLORE PARATHYROID GLANDS Left 2/25/2016    Procedure: MINIMALLY INVASIVE PARATHYROIDECTOMY; PTH MONITORING;  Surgeon: Kevyn Heredia MD;  Location:  MAIN OR;  Service: Surgical Oncology    TUBAL LIGATION       Social History   Social History     Substance and Sexual Activity   Alcohol Use Yes    Comment: social     Social History     Substance and Sexual Activity   Drug Use No     Social History     Tobacco Use   Smoking Status Heavy Tobacco Smoker    Packs/day: 0 50   Smokeless Tobacco Never Used     Family History:   Family History   Problem Relation Age of Onset    Thyroid disease Daughter        Meds/Allergies   Current Outpatient Medications   Medication Sig Dispense Refill    alendronate (FOSAMAX) 70 mg tablet TAKE 1 TABLET EVERY 7 DAYS WITH FULL GLASS OF WATER ON AN EMPTY STOMACH, STAY UPRIGHT FOR 30 MINUTES, ONCE A WEEK  12 tablet 4    hydroxychloroquine (PLAQUENIL) 200 mg tablet Take 1 tablet (200 mg total) by mouth daily 90 tablet 3    leflunomide (ARAVA) 20 MG tablet TAKE 1 TABLET DAILY 90 tablet 0    Omega-3 Fatty Acids (FISH OIL) 1200 MG CAPS Take 1,200 mg by mouth daily With 360mg omega 3      phenytoin (DILANTIN) 100 mg ER capsule Take by mouth 3 (three) times a day   Cholecalciferol (VITAMIN D) 50 MCG (2000 UT) CAPS Take one capsule every other day      LOTEMAX 0 5 % ophth gel INSTILL 1 DROP 3 TIMES A DAY INTO THE LEFT EYE   1     No current facility-administered medications for this visit  Allergies   Allergen Reactions    Amoxicillin-Pot Clavulanate     Penicillins Itching and Rash     Specifically Amoxicillin       Objective   Vitals: Blood pressure 106/58, pulse 74, height 5' 1" (1 549 m), weight 43 5 kg (95 lb 12 8 oz), not currently breastfeeding  Physical Exam   Constitutional: She is oriented to person, place, and time  She appears well-developed and well-nourished  No distress  HENT:   Head: Normocephalic and atraumatic  Eyes: Conjunctivae and EOM are normal  Right eye exhibits no discharge  Left eye exhibits no discharge  Neck: Normal range of motion  Neck supple  No thyromegaly present  Cardiovascular: Normal rate, regular rhythm and normal heart sounds  No murmur heard  Pulmonary/Chest: Effort normal and breath sounds normal  No respiratory distress  She has no wheezes  Musculoskeletal: Normal range of motion  She exhibits no edema, tenderness or deformity  Neurological: She is alert and oriented to person, place, and time  She has normal reflexes  Skin: Skin is warm and dry  She is not diaphoretic  No erythema  Psychiatric: She has a normal mood and affect  Her behavior is normal    Vitals reviewed  The history was obtained from the review of the chart, patient  Lab Results:   Lab Results   Component Value Date/Time    Potassium 4 3 12/27/2019 11:29 AM    Potassium 3 8 06/27/2019 12:31 PM    Chloride 106 12/27/2019 11:29 AM    Chloride 108 06/27/2019 12:31 PM    CO2 31 12/27/2019 11:29 AM    CO2 29 06/27/2019 12:31 PM    BUN 17 12/27/2019 11:29 AM    BUN 12 06/27/2019 12:31 PM    Creatinine 0 64 12/27/2019 11:29 AM    Creatinine 0 56 (L) 06/27/2019 12:31 PM    Glucose, Fasting 71 06/27/2019 12:31 PM    Calcium 9 8 12/27/2019 11:29 AM    Calcium 9 1 06/27/2019 12:31 PM    eGFR 99 12/27/2019 11:29 AM    eGFR 103 06/27/2019 12:31 PM    Vit D, 25-Hydroxy 58 2 12/27/2019 11:29 AM    Vit D, 25-Hydroxy 97 2 06/27/2019 12:31 PM         Imaging Studies:            Results for orders placed during the hospital encounter of 01/05/19   DXA bone density spine hip and pelvis    Impression 1  Based on the [facility] classification, the T-score of -3 7 in the left hip is consistent with OSTEOPOROSIS  2  When compared to the prior examination, there has been NO SIGNIFICANT CHANGE in the total bone mineral density of the lumbar spine or hip, when allowing for the least significant change  3   According to the [address] [place] [address], prescription therapy is recommended with a T-score of -2 5 or less in the spine or hip after appropriate evaluation to exclude secondary causes  4   A daily intake of at least 1200 mg Calcium and 800 to 1000 IU of Vitamin D, as well as weight bearing and muscle strengthening exercise, fall prevention and avoidance of tobacco and excessive alcohol intake as  basic preventive measures are   suggested    5   Repeat DXA  in 18 - 24 months, on the same machine, as clinically indicated  The 10 year risk of hip fracture is 11%, with the 10 year risk of major osteoporotic fracture being 26%, as calculated by the [facility] fracture risk assessment tool (FRAX)  The current NOF guidelines recommend treating patients with FRAX 10 year risk score   of >3% for hip fracture and >20% for major osteoporotic fracture  WHO CLASSIFICATION:  Normal (a T-score of -1 0 or higher)  Low bone mineral density (a T-score of less than -1 0 but higher than -2 5)  Osteoporosis (a T-score of -2 5 or less)  Severe osteoporosis (a T-score of -2 5 or less with a fragility fracture)                Workstation performed: [id number]                  I have personally reviewed pertinent reports  Portions of the record may have been created with voice recognition software  Occasional wrong word or "sound a like" substitutions may have occurred due to the inherent limitations of voice recognition software  Read the chart carefully and recognize, using context, where substitutions have occurred

## 2021-01-20 NOTE — PROGRESS NOTES
33F with symptoms for the last 2 weeks of abdominal pain and bloating. Reports symptoms are debilitating, intermittent, and began suddenly 2 weeks ago. Previously has not had abdominal issues. Reports significant bloating in her mid abdomen, with frequent belching. In addition has lower abdominal discomfort, not relieved with bowel movements.  She has tried OTC tums, which has worsened her constipation She took Reglan yesterday, which did not help her symptoms but made her very sleepy. SIgnificant nausea with urge to vomit but has not vomited  Has baseline constipation prior to her recent symptoms, for which she takes magnesium pills. Assessment    1  Rheumatoid arthritis of multiple sites with negative rheumatoid factor (714 0) (M06 09)   2  Osteoporosis (733 00) (M81 0)   3  Vitamin D deficiency (268 9) (E55 9)   4  Long-term use of hydroxychloroquine (V58 69) (Z79 899)   5  Encounter for monitoring leflunomide therapy (V58 83,V58 69) (Z51 81,Z79 899)   6  Hyperparathyroidism (252 00) (E21 3)   7  Parathyroid adenoma (227 1) (D35 1)   8  Seizure disorder (345 90) (G40 909)   9  Anemia, deficiency (281 9) (D53 9)    Plan    1  US GUIDED THYROID BIOPSY; Status:Active; Requested for:28Apr2016;     2  Hydroxychloroquine Sulfate 200 MG Oral Tablet; take 1 tablet daily with food    3  Follow-up visit in 3 months Evaluation and Treatment  Follow-up  Status: Hold For -   Scheduling  Requested for: 29Apr2016    4  Call [phone] if: The pain seems worse ; Status:Complete;   Done: 98ZBD9800   5  Call [phone] if: The symptoms seem worse ; Status:Complete;   Done:   29Apr2016   6  Call [phone] if: You have questions or concerns about your problem ;   Status:Complete;   Done: 29Apr2016    7  Leflunomide 20 MG Oral Tablet; TAKE 1 TABLET DAILY    Discussion/Summary    Ms Anna Marie Betts underwent a parathyroidectomy since her last evaluation, and since that time she has been feeling better  She continues to have some pain in her left wrist and left great toe, which is intermittent  She does report that her pain worsens with inclement weather  She has been using Aspercreme for her discomfort with good relief  She also continues to have some right sided shoulder pain  She will occasionally take Advil on severe days, with good relief of her pain  She has noted swelling in her left wrist and left great toe  She denies any morning stiffness or difficulty sleeping because of pain  She does report occasional nonrestorative sleep, but she denies any fatigue  She does feel the Plaquenil has been helping with her overall arthritic symptoms   On exam, there is swelling and mild tenderness to palpation of the left wrist with decreased range of motion of the left wrist  There is also tenderness to palpation of the right shoulder and right ankle, with mild swelling of the right ankle  There is also tenderness to palpation decreased range of motion of the left first IP joint  No new labs were available for review today  At this time, her rheumatoid arthritis does appear mildly active but relatively stable on leflunomide and Plaquenil  She did have laboratory studies drawn today for endocrinology, so I will await these results  I will not make any other changes to her medication regimen at this time, as it may take several more months for the Plaquenil to become fully effective  I will reevaluate her in 3 months  She will call in interim if there are any questions or concerns  Counseling  Rheumatology Counseling Documentation: The patient was counseled regarding instructions for management and impressions  Chief Complaint  F/U RA   Patient is here today for follow up of chronic conditions described in HPI  History of Present Illness  Pt  returns for F/U for RA  Had surgery for parathyroids in February  Feeling better since surgery  c/o some pain in L wrist and L great toe, which is intermittent  Pain worsens with inclement weather  Using Aspercreme for pain with good relief  Also with some R shoulder pain as well  Takes Advil on occasion for pain with good relief  + swelling in L wrist and L great toe  No AM stiffness  No difficulty sleeping due to pain  + occasional non-restorative sleep  No fatigue  RAPID3: not completed      Review of Systems    Constitutional: recent 2-3 lb weight gain, but no fever, fatigue, no chills, no recent weight loss and no anorexia     HEENT: dryness mouth and feeling congested, but no blurred vision, no double vision, no amaurosis fugax, no dryness of the eyes, no eye pain, no erythema eye(s), no mouth sores and no sore throat  Cardiovascular: no chest pain or pressure, no dyspnea on exertion and no swelling in the arms or legs  Respiratory: no unusual or persistent cough, no shortness of breath and no pleurisy  Gastrointestinal: no abdominal pain, no vomiting, no heartburn, no diarrhea, no constipation, no melena and no BRBPR    The patient presents with complaints of occasional episodes of nausea  Genitourinary: no foamy urine, but no dysuria and no hematuria  Musculoskeletal: as noted in HPI  Integumentary alopecia, but no rash, no Raynaud's, no nail changes and no photosensitivity  Endocrine no polyuria and no polydipsia  Hematologic/Lymphatic: no unusual bleeding and no tendency for easy bruising  Neurological: headache and tingling, but no vertigo or dizziness and no weakness  Psychiatric: no insomnia and no non-restorative sleep  Active Problems    1  Abnormal liver ultrasound (793 3) (R93 2)   2  Anemia, deficiency (281 9) (D53 9)   3  CRP elevated (790 95) (R79 82)   4  Elevated alkaline phosphatase level (790 5) (R74 8)   5  Encounter for monitoring leflunomide therapy (V58 83,V58 69) (Z51 81,Z79 899)   6  History of anemia (V12 3) (Z86 2)   7  Hypercalcemia (275 42) (E83 52)   8  Hyperparathyroidism (252 00) (E21 3)   9  Long-term use of hydroxychloroquine (V58 69) (Z79 899)   10  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)   11  Multiple thyroid nodules (241 1) (E04 2)   12  Osteoporosis (733 00) (M81 0)   13  Parathyroid adenoma (227 1) (D35 1)   14  Rheumatoid arthritis of multiple sites with negative rheumatoid factor (714 0) (M06 09)   15  Rotator cuff tendinitis, right (726 10) (M75 81)   16  Seizure disorder (345 90) (G40 909)   17  Shoulder pain (719 41) (M25 519)   18  Swelling of joint of left wrist (719 03) (M25 432)   19  Thyroid nodule (241 0) (E04 1)   20  Vitamin D deficiency (268 9) (E55 9)    Past Medical History    1  History of anemia (V12 3) (Z86 2)   2   History of arthritis (V13 4) (Z87 39)   3  History of cataract (V12 49) (Z86 69)   4  History of thyroid disease (V12 29) (Z86 39)   5  History of Left wrist pain (719 43) (M25 532)   6  Seizure disorder (345 90) (G40 909)   7  History of Seronegative rheumatoid arthritis (714 0) (M06 00)    The active problems and past medical history were reviewed and updated today  Surgical History    1  History of Arthrodesis Thumb Carpometacarpal Joint   2  History of Bunion Correction With Metatarsal Osteotomy   3  History of Cataract Surgery   4  History of Parathyroid Resection   5  History of Tubal Ligation   6  History of Wrist Surgery    The surgical history was reviewed and updated today  Family History  Mother    1  Family history of arthritis (V17 7) (Z82 61)  Daughter    2  Family history of malignant neoplasm of thyroid (V16 8) (Z80 8)  Maternal Grandmother    3  Family history of rheumatoid arthritis (V17 7) (Z82 61)  Family History    4  Family history of Diabetes (250 00) (E11 9)   5  Denied: Family history of psoriasis   6  Family history of systemic lupus erythematosus (V19 4) (Z82 69)   7  Denied: Family history of ulcerative colitis    The family history was reviewed and updated today  Social History    · Current smoker (305 1) (F17 200)   · Drinks coffee   · No drug use   · Occasional alcohol use  The social history was reviewed and updated today  The social history was reviewed and is unchanged  Current Meds   1  Advil 200 MG Oral Tablet; TAKE 2 TABLET 3 times daily PRN pain; Therapy: (Recorded:95Mlj1586) to Recorded   2  Hydroxychloroquine Sulfate 200 MG Oral Tablet; take 1 tablet daily with food; Therapy: 87NMS5875 to (Evaluate:83Bzs6483)  Requested for: 24LZB0278; Last   Rx:25Wlw1273 Ordered   3  Leflunomide 20 MG Oral Tablet; TAKE 1 TABLET DAILY; Therapy: 52UKI9508 to (Evaluate:30Twl6977)  Requested for: 10XOE0961; Last   Rx:21Dok3439 Ordered   4   Phenytoin Sodium 100 MG CAPS; TAKE 1 CAPSULE 3 TIMES DAILY; Therapy: (Recorded:33Pln9437) to Recorded   5  Vitamin D 2000 UNIT Oral Tablet; Take 1 tablet daily; Therapy: 18JYI3460 to (Evaluate:73Scd7215) Recorded    The medication list was reviewed and updated today  Allergies    1  Penicillins    2  Pollen    Vitals  Signs [Data Includes: Current Encounter]   Recorded: 66EIO5354 11:25AM   Heart Rate: 80  Systolic: 94  Diastolic: 60  Weight: 81 lb   BMI Calculated: 15 31  BSA Calculated: 1 29    Physical Exam    Constitutional   General appearance: Abnormal   chronically ill and underweight  Eyes   Conjunctiva and lids: No swelling, erythema or discharge  Pupils and irises: Equal, round and reactive to light  Ears, Nose, Mouth, and Throat   External inspection of ears and nose: Normal     Oropharynx: Abnormal   (+ poor dentition)   Pulmonary   Respiratory effort: No increased work of breathing or signs of respiratory distress  Auscultation of lungs: Abnormal   Auscultation of the lungs revealed decreased breath sounds diffusely  Cardiovascular   Auscultation of heart: Normal rate and rhythm, normal S1 and S2, without murmurs  Examination of extremities for edema and/or varicosities: Normal     Lymphatic   Palpation of lymph nodes in neck: No lymphadenopathy  Psychiatric   Orientation to person, place, and time: Normal     Mood and affect: Normal         Left wrist swelling and restricted ROM  Right ankle swelling  Left great toe IP tenderness and restricted ROM  Musculoskeletal - Joints, bones, and muscles: Abnormal  Palpation - bilateral knee crepitus  Skin - Skin and subcutaneous tissue: Normal    Neurologic - Sensation: Normal       Results/Data  Results   (1) CBC/PLT/DIFF 45SBI4394 10:58AM Reagan Carota     Test Name Result Flag Reference   WBC COUNT 5 59 Thousand/uL  4 31-10 16   RBC COUNT 3 58 Million/uL L 3 81-5 12   HEMOGLOBIN 9 6 g/dL L 11 5-15 4   HEMATOCRIT 31 1 % L 34 8-46  1   MCV 87 fL  82-98   MCH 26 8 pg 26 8-34 3   MCHC 30 9 g/dL L 31 4-37 4   RDW 13 6 %  11 6-15 1   MPV 9 3 fL  8 9-12 7   PLATELET COUNT 009 Thousands/uL  149-390   NEUTROPHILS RELATIVE PERCENT 57 %  43-75   LYMPHOCYTES RELATIVE PERCENT 26 %  14-44   MONOCYTES RELATIVE PERCENT 14 % H 4-12   EOSINOPHILS RELATIVE PERCENT 2 %  0-6   BASOPHILS RELATIVE PERCENT 1 %  0-1   NEUTROPHILS ABSOLUTE COUNT 3 20 Thousands/µL  1 85-7 62   LYMPHOCYTES ABSOLUTE COUNT 1 47 Thousands/µL  0 60-4 47   MONOCYTES ABSOLUTE COUNT 0 77 Thousand/µL  0 17-1 22   EOSINOPHILS ABSOLUTE COUNT 0 09 Thousand/µL  0 00-0 61   BASOPHILS ABSOLUTE COUNT 0 06 Thousands/µL  0 00-0 10     (1) BASIC METABOLIC PROFILE 93FDT9569 10:58AM Mason Davis   National Kidney Disease Education Program recommendations are as follows:  GFR calculation is accurate only with a steady state creatinine  Chronic Kidney disease less than 60 ml/min/1 73 sq  meters  Kidney failure less than 15 ml/min/1 73 sq  meters  Test Name Result Flag Reference   GLUCOSE,RANDM 79 mg/dL     If the patient is fasting, the ADA then defines impaired fasting glucose as > 100 mg/dL and diabetes as > or equal to 123 mg/dL  SODIUM 141 mmol/L  136-145   POTASSIUM 4 0 mmol/L  3 5-5 3   CHLORIDE 105 mmol/L  100-108   CARBON DIOXIDE 30 mmol/L  21-32   ANION GAP (CALC) 6 mmol/L  4-13   BLOOD UREA NITROGEN 18 mg/dL  5-25   CREATININE 0 60 mg/dL  0 60-1 30   Standardized to IDMS reference method   CALCIUM 10 2 mg/dL H 8 3-10 1   eGFR Non-African American      >60 0 ml/min/1 73sq m       Future Appointments    Date/Time Provider Specialty Site   06/30/2016 11:15 AM [facility]   07/29/2016 10:20 AM Lyssa Fonseca DO Rheumatology [facility]   09/12/2016 09:30 AM Mason Davis MD Surgical Oncology [facility]     Signatures   Electronically signed by : Shashi Avalos DO;  Apr 29 2016  5:09PM EST (Author)

## 2021-02-09 DIAGNOSIS — M81.0 OSTEOPOROSIS WITHOUT CURRENT PATHOLOGICAL FRACTURE, UNSPECIFIED OSTEOPOROSIS TYPE: ICD-10-CM

## 2021-02-09 RX ORDER — ALENDRONATE SODIUM 70 MG/1
TABLET ORAL
Qty: 12 TABLET | Refills: 3 | Status: SHIPPED | OUTPATIENT
Start: 2021-02-09 | End: 2022-01-11 | Stop reason: SDUPTHER

## 2022-01-11 DIAGNOSIS — M81.0 OSTEOPOROSIS WITHOUT CURRENT PATHOLOGICAL FRACTURE, UNSPECIFIED OSTEOPOROSIS TYPE: ICD-10-CM

## 2022-01-11 RX ORDER — ALENDRONATE SODIUM 70 MG/1
TABLET ORAL
Qty: 12 TABLET | Refills: 3 | Status: SHIPPED | OUTPATIENT
Start: 2022-01-11

## 2022-12-13 DIAGNOSIS — M81.0 OSTEOPOROSIS WITHOUT CURRENT PATHOLOGICAL FRACTURE, UNSPECIFIED OSTEOPOROSIS TYPE: ICD-10-CM

## 2022-12-13 RX ORDER — ALENDRONATE SODIUM 70 MG/1
TABLET ORAL
Qty: 12 TABLET | Refills: 3 | Status: SHIPPED | OUTPATIENT
Start: 2022-12-13

## 2023-03-06 ENCOUNTER — HOSPITAL ENCOUNTER (EMERGENCY)
Facility: HOSPITAL | Age: 63
Discharge: HOME | End: 2023-03-06
Attending: EMERGENCY MEDICINE

## 2023-03-06 VITALS
SYSTOLIC BLOOD PRESSURE: 131 MMHG | OXYGEN SATURATION: 97 % | DIASTOLIC BLOOD PRESSURE: 68 MMHG | RESPIRATION RATE: 20 BRPM | HEART RATE: 67 BPM | TEMPERATURE: 98.4 F

## 2023-03-06 DIAGNOSIS — H60.501 ACUTE OTITIS EXTERNA OF RIGHT EAR, UNSPECIFIED TYPE: Primary | ICD-10-CM

## 2023-03-06 RX ORDER — NEOMYCIN SULFATE, POLYMYXIN B SULFATE AND HYDROCORTISONE 10; 3.5; 1 MG/ML; MG/ML; [USP'U]/ML
4 SUSPENSION/ DROPS AURICULAR (OTIC) 3 TIMES DAILY
Qty: 10 ML | Refills: 0 | Status: SHIPPED | OUTPATIENT
Start: 2023-03-06

## 2023-03-06 NOTE — ED PROVIDER NOTES
History  Chief Complaint   Patient presents with   • Hearing Loss     Pt went to family doctor last week for hearing loss and was told their was wax build up  Pt put peroxide in R ear last night and now cannot hear out of it     81-year-old female, presenting with decreased hearing from right ear  Patient was told that she had wax buildup by PCP, has been using hydrogen peroxide and Q-tips  Denies any fevers or congestion  History provided by:  Patient   used: No        Prior to Admission Medications   Prescriptions Last Dose Informant Patient Reported? Taking? Cholecalciferol (VITAMIN D) 50 MCG (2000 UT) CAPS   No No   Sig: Take one capsule every other day   LOTEMAX 0 5 % ophth gel   Yes No   Sig: INSTILL 1 DROP 3 TIMES A DAY INTO THE LEFT EYE  Omega-3 Fatty Acids (FISH OIL) 1200 MG CAPS   Yes No   Sig: Take 1,200 mg by mouth daily With 360mg omega 3   alendronate (FOSAMAX) 70 mg tablet   No No   Sig: TAKE 1 TABLET EVERY 7 DAYS WITH FULL GLASS OF WATER ON AN EMPTY STOMACH, STAY UPRIGHT FOR 30 MINUTES, ONCE A WEEK    hydroxychloroquine (PLAQUENIL) 200 mg tablet   No No   Sig: Take 1 tablet (200 mg total) by mouth daily   leflunomide (ARAVA) 20 MG tablet   No No   Sig: TAKE 1 TABLET DAILY   phenytoin (DILANTIN) 100 mg ER capsule   Yes No   Sig: Take by mouth 3 (three) times a day        Facility-Administered Medications: None       Past Medical History:   Diagnosis Date   • Arthritis    • Hyperparathyroidism ([facility] [address] )    • Osteoporosis    • Pollen allergy    • Seizure disorder [facility])        Past Surgical History:   Procedure Laterality Date   • BUNIONECTOMY     • PARATHYROID GLAND SURGERY     • MS PARATHYROIDECTOMY/EXPLORATION PARATHYROIDS Left 2/25/2016    Procedure: MINIMALLY INVASIVE PARATHYROIDECTOMY; PTH MONITORING;  Surgeon: Radha Alston MD;  Location: [facility];  Service: Surgical Oncology   • TUBAL LIGATION         Family History   Problem Relation Age of Onset   • Thyroid disease Daughter      I have reviewed and agree with the history as documented  E-Cigarette/Vaping     E-Cigarette/Vaping Substances     Social History     Tobacco Use   • Smoking status: Heavy Smoker     Packs/day: 0 50     Types: Cigarettes   • Smokeless tobacco: Never   Substance Use Topics   • Alcohol use: Yes     Comment: social   • Drug use: No       Review of Systems   Constitutional: Negative  Negative for fever  Eyes: Negative  Respiratory: Negative  Gastrointestinal: Negative  Neurological: Negative  Physical Exam  Physical Exam  Vitals and nursing note reviewed  Constitutional:       General: She is not in acute distress  HENT:      Head: Normocephalic and atraumatic  Right Ear: External ear normal       Left Ear: Tympanic membrane and ear canal normal       Ears:      Comments: Right canal diffusely swollen, unable to visualize TM  No foreign body or cerumen noted  No drainage or bleeding  Mouth/Throat:      Mouth: Mucous membranes are moist       Pharynx: Oropharynx is clear  Cardiovascular:      Rate and Rhythm: Normal rate  Pulmonary:      Effort: Pulmonary effort is normal    Neurological:      General: No focal deficit present  Mental Status: She is alert and oriented to person, place, and time           Vital Signs  ED Triage Vitals [03/06/23 1332]   Temperature Pulse Respirations Blood Pressure SpO2   98 4 °F (36 9 °C) 67 20 131/68 97 %      Temp Source Heart Rate Source Patient Position - Orthostatic VS BP Location FiO2 (%)   Oral Monitor Sitting Right arm --      Pain Score       --           Vitals:    03/06/23 1332   BP: 131/68   Pulse: 67   Patient Position - Orthostatic VS: Sitting         Visual Acuity      ED Medications  Medications - No data to display    Diagnostic Studies  Results Reviewed     None                 No orders to display              Procedures  Procedures         ED Course                                             Medical Decision Making  71-year-old female, presenting with decreased hearing in right ear  Differential diagnosis includes cerumen impaction, foreign body, otitis externa among other diagnoses  On exam, patient has findings of otitis externa in right ear  States that she has been using hydroperoxide and Q-tips in right ear  Will place patient on antibiotic suspension, instructed not to get water or place any objects in ear  Discussed with patient follow-up with ENT or return to emergency department for any worsening or new concerning symptoms  Risk  Prescription drug management  Disposition  Final diagnoses:   Acute otitis externa of right ear, unspecified type     Time reflects when diagnosis was documented in both MDM as applicable and the Disposition within this note     Time User Action Codes Description Comment    3/6/2023  2:40 PM Pierre Jackson Add [D37 105] Acute otitis externa of right ear, unspecified type       ED Disposition     ED Disposition   Discharge    Condition   Stable    Date/Time   Mon Mar 6, 2023  2:40 PM    Comment   Anamaria Heap discharge to home/self care  Follow-up Information     Follow up With Specialties Details Why Contact Info Additional Information    [facility], [facility] [address] Throat Otolaryngology   [address] [facility] [address] [facility] [address] [facility], [address] [facility] [address] [facility], [place], [place] [address]          Patient's Medications   Discharge Prescriptions    NEOMYCIN-POLYMYXIN-HYDROCORTISONE (CORTISPORIN) 0 35%-10,000 UNITS/ML-1% OTIC SUSPENSION    Administer 4 drops to the right ear 3 (three) times a day       Start Date: 3/6/2023  End Date: --       Order Dose: 4 drops       Quantity: 10 mL    Refills: 0       No discharge procedures on file      PDMP Review     None          ED Provider  Electronically Signed by           Ana Sandoval MD  03/06/23 6128

## 2023-11-15 ENCOUNTER — OFFICE VISIT (OUTPATIENT)
Dept: ENDOCRINOLOGY | Facility: CLINIC | Age: 63
End: 2023-11-15
Payer: COMMERCIAL

## 2023-11-15 VITALS
HEART RATE: 70 BPM | DIASTOLIC BLOOD PRESSURE: 74 MMHG | WEIGHT: 106.8 LBS | BODY MASS INDEX: 20.16 KG/M2 | HEIGHT: 61 IN | SYSTOLIC BLOOD PRESSURE: 128 MMHG

## 2023-11-15 DIAGNOSIS — E55.9 VITAMIN D DEFICIENCY: ICD-10-CM

## 2023-11-15 DIAGNOSIS — M81.0 OSTEOPOROSIS WITHOUT CURRENT PATHOLOGICAL FRACTURE, UNSPECIFIED OSTEOPOROSIS TYPE: Primary | ICD-10-CM

## 2023-11-15 DIAGNOSIS — E04.2 MULTIPLE THYROID NODULES: ICD-10-CM

## 2023-11-15 PROCEDURE — 99214 OFFICE O/P EST MOD 30 MIN: CPT | Performed by: INTERNAL MEDICINE

## 2023-11-15 NOTE — PROGRESS NOTES
Edis Porras 59 y.o. female MRN: [id number]    Encounter: [id number]      Assessment/Plan     Assessment: This is a 59y.o.-year-old female with multiple thyroid nodules     Plan:  Diagnoses and all orders for this visit:    Osteoporosis without current pathological fracture, unspecified osteoporosis type  Previously treated with Prolia injection, x3 then switched to Fosamax which she took it for 1-1/2-year and currently off of all medications. She is here to establish visit again for osteoporosis management. Patient prefers IV Reclast infusion once a year as she forgets to take once a week Fosamax. She is due for DEXA scan. Discussed to obtain DEXA scan first and based on the result will decide the next steps  Discussed importance of taking calcium supplementation 500 mg twice a day and vitamin D3 2000 IU daily  -     DXA bone density spine hip and pelvis; Future  -     Protein electrophoresis, serum Lab Collect; Future  -     PTH, intact- Lab Collect; Future  -     NTX Panel, Urine  -     Basic metabolic panel; Future    Multiple thyroid nodules  Pt  is due for ultrasound of thyroid last ultrasound was done in 2019 which showed stable thyroid nodules do not meet criteria for aspiration. Previously biopsied TI-RADS 4 lesion in left mid gland has been biopsied with benign results in stable for 3.5 years, follow-up as needed  -     US thyroid; Future    Vitamin D deficiency  Take vitamin D 3, 2000 IU daily   -     Vitamin D 25 hydroxy; Future  -     Basic metabolic panel; Future  -     Vitamin D 25 hydroxy; Future         CC:     Multiple thyroid nodules, osteoporosis      History of Present Illness     HPI:    Edis Porras is a 29-year-old woman with medical history of primary hyperparathyroidism status post minimally invasive parathyroid adenoma surgery in 2016 with normalization of calcium, osteoporosis, vitamin D deficiency, multiple thyroid nodules is here for follow-up.     She has history of osteoporosis which was diagnosed in 2016, based on T score of lumbar spine -3.2, left total hip T score -3.0,  left femoral neck T score -3.7 consistent with osteoporosis. She was started on Prolia injection every 6 months, she received 3 injection of her diet Prolia was denied by [employer] and she was started on bisphosphonate 70 mg once a week. She took Fosamax for 1 year, stopped taking Fosamax for last few months. she takes vitamin D3 supplementation 2000 IU daily. She denies history of GERD or hiatal hernia or history of gastritis    She also has history of elevated alkaline phosphatase for which she had a bone scan and did not show any evidence of metastasis or bone lesions. He was evaluated by gastroenterology for elevated alkaline phosphatase level. He has history of multinodular goiter, FNA of left upper pole, middle pole and lower pole nodule in 2016 which showed atypia of undetermined significance, [facility].     Denies history of thyroid cancer in family, history of radiation to the neck chest or head area     Component      Latest Ref Rng 1/3/2019 6/27/2019 12/27/2019   Sodium      136 - 145 mmol/L   138    Potassium      3.5 - 5.3 mmol/L   4.3    Chloride      100 - 108 mmol/L   106    CO2      21 - 32 mmol/L   31    Anion Gap      4 - 13 mmol/L   1 (L)    BUN      5 - 25 mg/dL   17    Creatinine      0.60 - 1.30 mg/dL   0.64    Glucose, Random      65 - 140 mg/dL   78    Calcium      8.3 - 10.1 mg/dL   9.8    eGFR      ml/min/1.73sq m   99    Vit D, 25-Hydroxy      30.0 - 100.0 ng/mL 81.3  97.2  58.2    PARATHYROID HORMONE      18.4 - 80.1 pg/mL 56.3      TSH 3RD GENERATON      0.358 - 3.740 uIU/mL 0.824      Free T4      0.76 - 1.46 ng/dL 0.76           BP Readings from Last 3 Encounters:   11/15/23 128/74   03/06/23 131/68   01/22/20 106/58        Review of Systems   Constitutional:  Negative for activity change, diaphoresis, fatigue, fever and unexpected weight change. HENT: Negative. Eyes:  Negative for visual disturbance. Respiratory:  Negative for cough, chest tightness and shortness of breath. Cardiovascular:  Negative for chest pain, palpitations and leg swelling. Gastrointestinal:  Negative for abdominal pain, blood in stool, constipation, diarrhea, nausea and vomiting. Endocrine: Negative for cold intolerance, heat intolerance, polydipsia, polyphagia and polyuria. Genitourinary:  Negative for dysuria, enuresis, frequency and urgency. Musculoskeletal:  Negative for arthralgias and myalgias. Skin:  Negative for pallor, rash and wound. Allergic/Immunologic: Negative. Neurological:  Negative for dizziness, tremors, weakness and numbness. Hematological: Negative. Psychiatric/Behavioral: Negative.          Historical Information   Past Medical History:   Diagnosis Date    Arthritis     Hyperparathyroidism ([address] [facility])     Osteoporosis     Pollen allergy     Seizure disorder [facility])      Past Surgical History:   Procedure Laterality Date    BUNIONECTOMY      PARATHYROID GLAND SURGERY      [place] PARATHYROIDECTOMY/EXPLORATION PARATHYROIDS Left 02/25/2016    Procedure: MINIMALLY INVASIVE PARATHYROIDECTOMY; PTH MONITORING;  Surgeon: Jimmy De La Cruz MD;  Location: [facility];  Service: Surgical Oncology    THYROID SURGERY      TUBAL LIGATION       Social History   Social History     Substance and Sexual Activity   Alcohol Use Yes    Comment: social     Social History     Substance and Sexual Activity   Drug Use No     Social History     Tobacco Use   Smoking Status Heavy Smoker    Packs/day: 0.50    Types: Cigarettes   Smokeless Tobacco Never     Family History:   Family History   Problem Relation Age of Onset    Thyroid disease Daughter        Meds/Allergies   Current Outpatient Medications   Medication Sig Dispense Refill    alendronate (FOSAMAX) 70 mg tablet TAKE 1 TABLET EVERY 7 DAYS WITH FULL GLASS OF WATER ON AN EMPTY STOMACH, STAY UPRIGHT FOR 30 MINUTES, ONCE A WEEK. 12 tablet 3    ibuprofen (MOTRIN) 200 mg tablet       Omega-3 Fatty Acids (FISH OIL) 1200 MG CAPS Take 1,200 mg by mouth daily With 360mg omega 3      phenytoin (DILANTIN) 100 mg ER capsule Take 100 mg by mouth every 8 (eight) hours      Cholecalciferol (VITAMIN D) 50 MCG (2000 UT) CAPS Take one capsule every other day (Patient not taking: Reported on 11/15/2023)      hydroxychloroquine (PLAQUENIL) 200 mg tablet Take 1 tablet (200 mg total) by mouth daily (Patient not taking: Reported on 4/25/2023) 90 tablet 3    leflunomide (ARAVA) 20 MG tablet TAKE 1 TABLET DAILY (Patient not taking: Reported on 4/25/2023) 90 tablet 0    LOTEMAX 0.5 % ophth gel INSTILL 1 DROP 3 TIMES A DAY INTO THE LEFT EYE. (Patient not taking: Reported on 4/25/2023)  1    neomycin-polymyxin-hydrocortisone (CORTISPORIN) 0.35%-10,000 units/mL-1% otic suspension Administer 4 drops to the right ear 3 (three) times a day (Patient not taking: Reported on 4/25/2023) 10 mL 0    phenytoin (DILANTIN) 100 mg ER capsule Take by mouth 3 (three) times a day. (Patient not taking: Reported on 4/25/2023)       No current facility-administered medications for this visit. Allergies   Allergen Reactions    Amoxicillin-Pot Clavulanate     Penicillins Itching and Rash     Specifically Amoxicillin       Objective   Vitals: Blood pressure 128/74, pulse 70, height 5' 1" (1.549 m), weight 48.4 kg (106 lb 12.8 oz), not currently breastfeeding. Physical Exam  Vitals reviewed. Constitutional:       General: She is not in acute distress. Appearance: Normal appearance. She is not ill-appearing. HENT:      Head: Normocephalic and atraumatic. Nose: Nose normal.   Eyes:      Extraocular Movements: Extraocular movements intact. Conjunctiva/sclera: Conjunctivae normal.   Pulmonary:      Effort: No respiratory distress. Musculoskeletal:      Cervical back: Normal range of motion.    Neurological:      General: No focal deficit present. Mental Status: She is alert and oriented to person, place, and time. Psychiatric:         Mood and Affect: Mood normal.         Behavior: Behavior normal.         The history was obtained from the review of the chart, patient. Lab Results:          Imaging Studies:         Results for orders placed during the hospital encounter of 01/05/19    DXA bone density spine hip and pelvis    Impression  1. Based on the [facility] classification, the T-score of -3.7 in the left hip is consistent with OSTEOPOROSIS. 2. When compared to the prior examination, there has been NO SIGNIFICANT CHANGE in the total bone mineral density of the lumbar spine or hip, when allowing for the least significant change. 3.  According to the [employer], prescription therapy is recommended with a T-score of -2.5 or less in the spine or hip after appropriate evaluation to exclude secondary causes. 4.  A daily intake of at least 1200 mg Calcium and 800 to 1000 IU of Vitamin D, as well as weight bearing and muscle strengthening exercise, fall prevention and avoidance of tobacco and excessive alcohol intake as  basic preventive measures are  suggested. 5.  Repeat DXA  in 18 - 24 months, on the same machine, as clinically indicated. The 10 year risk of hip fracture is 11%, with the 10 year risk of major osteoporotic fracture being 26%, as calculated by the [facility] fracture risk assessment tool (FRAX). The current NOF guidelines recommend treating patients with FRAX 10 year risk score  of >3% for hip fracture and >20% for major osteoporotic fracture. WHO CLASSIFICATION:  Normal (a T-score of -1.0 or higher)  Low bone mineral density (a T-score of less than -1.0 but higher than -2.5)  Osteoporosis (a T-score of -2.5 or less)  Severe osteoporosis (a T-score of -2.5 or less with a fragility fracture)              Workstation performed: [id number]            I have personally reviewed pertinent reports. Portions of the record may have been created with voice recognition software. Occasional wrong word or "sound a like" substitutions may have occurred due to the inherent limitations of voice recognition software. Read the chart carefully and recognize, using context, where substitutions have occurred.

## 2023-11-16 ENCOUNTER — APPOINTMENT (OUTPATIENT)
Dept: LAB | Facility: MEDICAL CENTER | Age: 63
End: 2023-11-16
Payer: COMMERCIAL

## 2023-11-16 DIAGNOSIS — M81.0 OSTEOPOROSIS WITHOUT CURRENT PATHOLOGICAL FRACTURE, UNSPECIFIED OSTEOPOROSIS TYPE: ICD-10-CM

## 2023-11-16 DIAGNOSIS — E55.9 VITAMIN D DEFICIENCY: ICD-10-CM

## 2023-11-16 LAB
25(OH)D3 SERPL-MCNC: 10.8 NG/ML (ref 30–100)
ANION GAP SERPL CALCULATED.3IONS-SCNC: 10 MMOL/L
BUN SERPL-MCNC: 14 MG/DL (ref 5–25)
CALCIUM SERPL-MCNC: 11.1 MG/DL (ref 8.4–10.2)
CHLORIDE SERPL-SCNC: 105 MMOL/L (ref 96–108)
CO2 SERPL-SCNC: 27 MMOL/L (ref 21–32)
CREAT SERPL-MCNC: 0.65 MG/DL (ref 0.6–1.3)
GFR SERPL CREATININE-BSD FRML MDRD: 95 ML/MIN/1.73SQ M
GLUCOSE P FAST SERPL-MCNC: 80 MG/DL (ref 65–99)
POTASSIUM SERPL-SCNC: 4.2 MMOL/L (ref 3.5–5.3)
PTH-INTACT SERPL-MCNC: 89.2 PG/ML (ref 12–88)
SODIUM SERPL-SCNC: 142 MMOL/L (ref 135–147)

## 2023-11-16 PROCEDURE — 84165 PROTEIN E-PHORESIS SERUM: CPT

## 2023-11-16 PROCEDURE — 83970 ASSAY OF PARATHORMONE: CPT

## 2023-11-16 PROCEDURE — 80048 BASIC METABOLIC PNL TOTAL CA: CPT

## 2023-11-16 PROCEDURE — 82570 ASSAY OF URINE CREATININE: CPT | Performed by: INTERNAL MEDICINE

## 2023-11-16 PROCEDURE — 82523 COLLAGEN CROSSLINKS: CPT | Performed by: INTERNAL MEDICINE

## 2023-11-16 PROCEDURE — 82306 VITAMIN D 25 HYDROXY: CPT

## 2023-11-16 PROCEDURE — 36415 COLL VENOUS BLD VENIPUNCTURE: CPT

## 2023-11-18 LAB
ALBUMIN SERPL ELPH-MCNC: 3.9 G/DL (ref 3.2–5.1)
ALBUMIN SERPL ELPH-MCNC: 54.1 % (ref 48–70)
ALPHA1 GLOB SERPL ELPH-MCNC: 0.38 G/DL (ref 0.15–0.47)
ALPHA1 GLOB SERPL ELPH-MCNC: 5.3 % (ref 1.8–7)
ALPHA2 GLOB SERPL ELPH-MCNC: 0.94 G/DL (ref 0.42–1.04)
ALPHA2 GLOB SERPL ELPH-MCNC: 13 % (ref 5.9–14.9)
BETA GLOB ABNORMAL SERPL ELPH-MCNC: 0.4 G/DL (ref 0.31–0.57)
BETA1 GLOB SERPL ELPH-MCNC: 5.6 % (ref 4.7–7.7)
BETA2 GLOB SERPL ELPH-MCNC: 7.1 % (ref 3.1–7.9)
BETA2+GAMMA GLOB SERPL ELPH-MCNC: 0.51 G/DL (ref 0.2–0.58)
GAMMA GLOB ABNORMAL SERPL ELPH-MCNC: 1.07 G/DL (ref 0.4–1.66)
GAMMA GLOB SERPL ELPH-MCNC: 14.9 % (ref 6.9–22.3)
IGG/ALB SER: 1.18 {RATIO} (ref 1.1–1.8)
PROT PATTERN SERPL ELPH-IMP: NORMAL
PROT SERPL-MCNC: 7.2 G/DL (ref 6.4–8.2)

## 2023-11-18 PROCEDURE — 84165 PROTEIN E-PHORESIS SERUM: CPT | Performed by: STUDENT IN AN ORGANIZED HEALTH CARE EDUCATION/TRAINING PROGRAM

## 2023-11-20 ENCOUNTER — HOSPITAL ENCOUNTER (OUTPATIENT)
Dept: ULTRASOUND IMAGING | Facility: HOSPITAL | Age: 63
Discharge: HOME | End: 2023-11-20
Attending: INTERNAL MEDICINE
Payer: COMMERCIAL

## 2023-11-20 DIAGNOSIS — E04.2 MULTIPLE THYROID NODULES: ICD-10-CM

## 2023-11-20 LAB
COLLAGEN NTX UR-SCNC: 1263 NMOL BCE
COLLAGEN NTX/CREAT UR-SRTO: 109 NM BCE/MM CR (ref 0–89)
CREAT UR-MCNC: 131.4 MG/DL
INTERPRETIVE GUIDE:: ABNORMAL

## 2023-11-20 PROCEDURE — 76536 US EXAM OF HEAD AND NECK: CPT

## 2024-03-09 ENCOUNTER — APPOINTMENT (EMERGENCY)
Dept: RADIOLOGY | Facility: HOSPITAL | Age: 64
DRG: 093 | End: 2024-03-09
Payer: COMMERCIAL

## 2024-03-09 ENCOUNTER — APPOINTMENT (EMERGENCY)
Dept: CT IMAGING | Facility: HOSPITAL | Age: 64
DRG: 093 | End: 2024-03-09
Payer: COMMERCIAL

## 2024-03-09 ENCOUNTER — HOSPITAL ENCOUNTER (INPATIENT)
Facility: HOSPITAL | Age: 64
LOS: 3 days | Discharge: HOME | DRG: 093 | End: 2024-03-12
Attending: EMERGENCY MEDICINE | Admitting: INTERNAL MEDICINE
Payer: COMMERCIAL

## 2024-03-09 DIAGNOSIS — Z91.81 HISTORY OF RECENT FALL: ICD-10-CM

## 2024-03-09 DIAGNOSIS — J32.9 SINUSITIS: ICD-10-CM

## 2024-03-09 DIAGNOSIS — T14.8XXA HEMATOMA: ICD-10-CM

## 2024-03-09 DIAGNOSIS — R27.0 ATAXIA: ICD-10-CM

## 2024-03-09 DIAGNOSIS — H55.00 NYSTAGMUS: ICD-10-CM

## 2024-03-09 DIAGNOSIS — E78.5 HYPERLIPIDEMIA, UNSPECIFIED HYPERLIPIDEMIA TYPE: ICD-10-CM

## 2024-03-09 DIAGNOSIS — T42.0X1A DILANTIN TOXICITY: Primary | ICD-10-CM

## 2024-03-09 PROBLEM — R26.89 IMBALANCE: Status: ACTIVE | Noted: 2024-03-09

## 2024-03-09 PROBLEM — E04.1 THYROID NODULE: Status: ACTIVE | Noted: 2024-03-09

## 2024-03-09 PROBLEM — J44.9 COPD (CHRONIC OBSTRUCTIVE PULMONARY DISEASE) (HCC): Status: ACTIVE | Noted: 2024-03-09

## 2024-03-09 LAB
2HR DELTA HS TROPONIN: 1 NG/L
4HR DELTA HS TROPONIN: 1 NG/L
ALBUMIN SERPL BCP-MCNC: 4.1 G/DL (ref 3.5–5)
ALP SERPL-CCNC: 192 U/L (ref 34–104)
ALT SERPL W P-5'-P-CCNC: 10 U/L (ref 7–52)
ANION GAP SERPL CALCULATED.3IONS-SCNC: 5 MMOL/L
APTT PPP: 32 SECONDS (ref 23–37)
AST SERPL W P-5'-P-CCNC: 13 U/L (ref 13–39)
ATRIAL RATE: 68 BPM
BACTERIA UR QL AUTO: NORMAL /HPF
BASOPHILS # BLD AUTO: 0.05 THOUSANDS/ÂΜL (ref 0–0.1)
BASOPHILS NFR BLD AUTO: 1 % (ref 0–1)
BILIRUB SERPL-MCNC: 0.28 MG/DL (ref 0.2–1)
BILIRUB UR QL STRIP: NEGATIVE
BUN SERPL-MCNC: 16 MG/DL (ref 5–25)
CALCIUM SERPL-MCNC: 9.3 MG/DL (ref 8.4–10.2)
CARDIAC TROPONIN I PNL SERPL HS: 3 NG/L
CARDIAC TROPONIN I PNL SERPL HS: 4 NG/L
CARDIAC TROPONIN I PNL SERPL HS: 4 NG/L
CHLORIDE SERPL-SCNC: 105 MMOL/L (ref 96–108)
CLARITY UR: CLEAR
CO2 SERPL-SCNC: 28 MMOL/L (ref 21–32)
COLOR UR: ABNORMAL
CREAT SERPL-MCNC: 0.64 MG/DL (ref 0.6–1.3)
EOSINOPHIL # BLD AUTO: 0.02 THOUSAND/ÂΜL (ref 0–0.61)
EOSINOPHIL NFR BLD AUTO: 0 % (ref 0–6)
ERYTHROCYTE [DISTWIDTH] IN BLOOD BY AUTOMATED COUNT: 13.6 % (ref 11.6–15.1)
EST. AVERAGE GLUCOSE BLD GHB EST-MCNC: 117 MG/DL
GFR SERPL CREATININE-BSD FRML MDRD: 95 ML/MIN/1.73SQ M
GLUCOSE SERPL-MCNC: 104 MG/DL (ref 65–140)
GLUCOSE SERPL-MCNC: 93 MG/DL (ref 65–140)
GLUCOSE UR STRIP-MCNC: NEGATIVE MG/DL
HBA1C MFR BLD: 5.7 %
HCT VFR BLD AUTO: 39.5 % (ref 34.8–46.1)
HGB BLD-MCNC: 12.5 G/DL (ref 11.5–15.4)
HGB UR QL STRIP.AUTO: ABNORMAL
IMM GRANULOCYTES # BLD AUTO: 0.02 THOUSAND/UL (ref 0–0.2)
IMM GRANULOCYTES NFR BLD AUTO: 0 % (ref 0–2)
INR PPP: 0.98 (ref 0.84–1.19)
KETONES UR STRIP-MCNC: NEGATIVE MG/DL
LEUKOCYTE ESTERASE UR QL STRIP: NEGATIVE
LYMPHOCYTES # BLD AUTO: 1.73 THOUSANDS/ÂΜL (ref 0.6–4.47)
LYMPHOCYTES NFR BLD AUTO: 31 % (ref 14–44)
MCH RBC QN AUTO: 28 PG (ref 26.8–34.3)
MCHC RBC AUTO-ENTMCNC: 31.6 G/DL (ref 31.4–37.4)
MCV RBC AUTO: 89 FL (ref 82–98)
MONOCYTES # BLD AUTO: 0.41 THOUSAND/ÂΜL (ref 0.17–1.22)
MONOCYTES NFR BLD AUTO: 7 % (ref 4–12)
NEUTROPHILS # BLD AUTO: 3.45 THOUSANDS/ÂΜL (ref 1.85–7.62)
NEUTS SEG NFR BLD AUTO: 61 % (ref 43–75)
NITRITE UR QL STRIP: NEGATIVE
NON-SQ EPI CELLS URNS QL MICRO: NORMAL /HPF
NRBC BLD AUTO-RTO: 0 /100 WBCS
P AXIS: 67 DEGREES
PH UR STRIP.AUTO: 5.5 [PH]
PHENYTOIN SERPL-MCNC: 50.6 UG/ML (ref 10–20)
PLATELET # BLD AUTO: 323 THOUSANDS/UL (ref 149–390)
PMV BLD AUTO: 8.9 FL (ref 8.9–12.7)
POTASSIUM SERPL-SCNC: 4.3 MMOL/L (ref 3.5–5.3)
PR INTERVAL: 142 MS
PROT SERPL-MCNC: 7.4 G/DL (ref 6.4–8.4)
PROT UR STRIP-MCNC: NEGATIVE MG/DL
PROTHROMBIN TIME: 13.6 SECONDS (ref 11.6–14.5)
QRS AXIS: 11 DEGREES
QRSD INTERVAL: 100 MS
QT INTERVAL: 414 MS
QTC INTERVAL: 416 MS
RBC # BLD AUTO: 4.46 MILLION/UL (ref 3.81–5.12)
RBC #/AREA URNS AUTO: NORMAL /HPF
SODIUM SERPL-SCNC: 138 MMOL/L (ref 135–147)
SP GR UR STRIP.AUTO: 1.01 (ref 1–1.03)
T WAVE AXIS: 204 DEGREES
UROBILINOGEN UR STRIP-ACNC: <2 MG/DL
VENTRICULAR RATE: 61 BPM
WBC # BLD AUTO: 5.68 THOUSAND/UL (ref 4.31–10.16)
WBC #/AREA URNS AUTO: NORMAL /HPF

## 2024-03-09 PROCEDURE — 36415 COLL VENOUS BLD VENIPUNCTURE: CPT | Performed by: PHYSICIAN ASSISTANT

## 2024-03-09 PROCEDURE — 82948 REAGENT STRIP/BLOOD GLUCOSE: CPT

## 2024-03-09 PROCEDURE — 90686 IIV4 VACC NO PRSV 0.5 ML IM: CPT | Performed by: HOSPITALIST

## 2024-03-09 PROCEDURE — 85025 COMPLETE CBC W/AUTO DIFF WBC: CPT | Performed by: PHYSICIAN ASSISTANT

## 2024-03-09 PROCEDURE — 99222 1ST HOSP IP/OBS MODERATE 55: CPT | Performed by: INTERNAL MEDICINE

## 2024-03-09 PROCEDURE — 83036 HEMOGLOBIN GLYCOSYLATED A1C: CPT

## 2024-03-09 PROCEDURE — 99291 CRITICAL CARE FIRST HOUR: CPT | Performed by: EMERGENCY MEDICINE

## 2024-03-09 PROCEDURE — 85730 THROMBOPLASTIN TIME PARTIAL: CPT | Performed by: PHYSICIAN ASSISTANT

## 2024-03-09 PROCEDURE — 80185 ASSAY OF PHENYTOIN TOTAL: CPT | Performed by: PHYSICIAN ASSISTANT

## 2024-03-09 PROCEDURE — 96361 HYDRATE IV INFUSION ADD-ON: CPT

## 2024-03-09 PROCEDURE — 99285 EMERGENCY DEPT VISIT HI MDM: CPT

## 2024-03-09 PROCEDURE — 85610 PROTHROMBIN TIME: CPT | Performed by: PHYSICIAN ASSISTANT

## 2024-03-09 PROCEDURE — 81001 URINALYSIS AUTO W/SCOPE: CPT | Performed by: PHYSICIAN ASSISTANT

## 2024-03-09 PROCEDURE — 70496 CT ANGIOGRAPHY HEAD: CPT

## 2024-03-09 PROCEDURE — 96360 HYDRATION IV INFUSION INIT: CPT

## 2024-03-09 PROCEDURE — 90471 IMMUNIZATION ADMIN: CPT | Performed by: HOSPITALIST

## 2024-03-09 PROCEDURE — 99255 IP/OBS CONSLTJ NEW/EST HI 80: CPT | Performed by: STUDENT IN AN ORGANIZED HEALTH CARE EDUCATION/TRAINING PROGRAM

## 2024-03-09 PROCEDURE — 70498 CT ANGIOGRAPHY NECK: CPT

## 2024-03-09 PROCEDURE — 93005 ELECTROCARDIOGRAM TRACING: CPT

## 2024-03-09 PROCEDURE — 71045 X-RAY EXAM CHEST 1 VIEW: CPT

## 2024-03-09 PROCEDURE — 80053 COMPREHEN METABOLIC PANEL: CPT | Performed by: PHYSICIAN ASSISTANT

## 2024-03-09 PROCEDURE — 84484 ASSAY OF TROPONIN QUANT: CPT | Performed by: PHYSICIAN ASSISTANT

## 2024-03-09 RX ORDER — SODIUM CHLORIDE 9 MG/ML
100 INJECTION, SOLUTION INTRAVENOUS CONTINUOUS
Status: DISPENSED | OUTPATIENT
Start: 2024-03-09 | End: 2024-03-10

## 2024-03-09 RX ORDER — LEVETIRACETAM 100 MG/ML
500 SOLUTION ORAL EVERY 12 HOURS SCHEDULED
Status: DISCONTINUED | OUTPATIENT
Start: 2024-03-09 | End: 2024-03-10

## 2024-03-09 RX ORDER — ENOXAPARIN SODIUM 100 MG/ML
40 INJECTION SUBCUTANEOUS
Status: DISCONTINUED | OUTPATIENT
Start: 2024-03-10 | End: 2024-03-12 | Stop reason: HOSPADM

## 2024-03-09 RX ORDER — ACETAMINOPHEN 325 MG/1
650 TABLET ORAL EVERY 6 HOURS PRN
Status: DISCONTINUED | OUTPATIENT
Start: 2024-03-09 | End: 2024-03-12 | Stop reason: HOSPADM

## 2024-03-09 RX ADMIN — INFLUENZA VIRUS VACCINE 0.5 ML: 15; 15; 15; 15 SUSPENSION INTRAMUSCULAR at 17:31

## 2024-03-09 RX ADMIN — SODIUM CHLORIDE 1000 ML: 0.9 INJECTION, SOLUTION INTRAVENOUS at 12:54

## 2024-03-09 RX ADMIN — IOHEXOL 85 ML: 350 INJECTION, SOLUTION INTRAVENOUS at 14:18

## 2024-03-09 RX ADMIN — SODIUM CHLORIDE 100 ML/HR: 0.9 INJECTION, SOLUTION INTRAVENOUS at 17:24

## 2024-03-09 RX ADMIN — LEVETIRACETAM 500 MG: 100 SOLUTION ORAL at 22:00

## 2024-03-09 NOTE — PLAN OF CARE
Problem: Potential for Falls  Goal: Patient will remain free of falls  Description: INTERVENTIONS:  - Educate patient/family on patient safety including physical limitations  - Instruct patient to call for assistance with activity   - Consult OT/PT to assist with strengthening/mobility   - Keep Call bell within reach  - Keep bed low and locked with side rails adjusted as appropriate  - Keep care items and personal belongings within reach  - Initiate and maintain comfort rounds  - Make Fall Risk Sign visible to staff  - Offer Toileting every  Hours, in advance of need  - Initiate/Maintain alarm  - Obtain necessary fall risk management equipment:   - Apply yellow socks and bracelet for high fall risk patients  - Consider moving patient to room near nurses station  Outcome: Progressing     Problem: PAIN - ADULT  Goal: Verbalizes/displays adequate comfort level or baseline comfort level  Description: Interventions:  - Encourage patient to monitor pain and request assistance  - Assess pain using appropriate pain scale  - Administer analgesics based on type and severity of pain and evaluate response  - Implement non-pharmacological measures as appropriate and evaluate response  - Consider cultural and social influences on pain and pain management  - Notify physician/advanced practitioner if interventions unsuccessful or patient reports new pain  Outcome: Progressing     Problem: SAFETY ADULT  Goal: Patient will remain free of falls  Description: INTERVENTIONS:  - Educate patient/family on patient safety including physical limitations  - Instruct patient to call for assistance with activity   - Consult OT/PT to assist with strengthening/mobility   - Keep Call bell within reach  - Keep bed low and locked with side rails adjusted as appropriate  - Keep care items and personal belongings within reach  - Initiate and maintain comfort rounds  - Make Fall Risk Sign visible to staff  - Offer Toileting every  Hours, in advance  of need  - Initiate/Maintain alarm  - Obtain necessary fall risk management equipment:   - Apply yellow socks and bracelet for high fall risk patients  - Consider moving patient to room near nurses station  Outcome: Progressing  Goal: Maintain or return to baseline ADL function  Description: INTERVENTIONS:  -  Assess patient's ability to carry out ADLs; assess patient's baseline for ADL function and identify physical deficits which impact ability to perform ADLs (bathing, care of mouth/teeth, toileting, grooming, dressing, etc.)  - Assess/evaluate cause of self-care deficits   - Assess range of motion  - Assess patient's mobility; develop plan if impaired  - Assess patient's need for assistive devices and provide as appropriate  - Encourage maximum independence but intervene and supervise when necessary  - Involve family in performance of ADLs  - Assess for home care needs following discharge   - Consider OT consult to assist with ADL evaluation and planning for discharge  - Provide patient education as appropriate  Outcome: Progressing  Goal: Maintains/Returns to pre admission functional level  Description: INTERVENTIONS:  - Perform AM-PAC 6 Click Basic Mobility/ Daily Activity assessment daily.  - Set and communicate daily mobility goal to care team and patient/family/caregiver.   - Collaborate with rehabilitation services on mobility goals if consulted  - Perform Range of Motion  times a day.  - Reposition patient every  hours.  - Dangle patient  times a day  - Stand patient  times a day  - Ambulate patient  times a day  - Out of bed to chair  times a day   - Out of bed for meals times a day  - Out of bed for toileting  - Record patient progress and toleration of activity level   Outcome: Progressing     Problem: NEUROSENSORY - ADULT  Goal: Achieves stable or improved neurological status  Description: INTERVENTIONS  - Monitor and report changes in neurological status  - Monitor vital signs such as temperature,  blood pressure, glucose, and any other labs ordered   - Initiate measures to prevent increased intracranial pressure  - Monitor for seizure activity and implement precautions if appropriate      Outcome: Progressing  Goal: Remains free of injury related to seizures activity  Description: INTERVENTIONS  - Maintain airway, patient safety  and administer oxygen as ordered  - Monitor patient for seizure activity, document and report duration and description of seizure to physician/advanced practitioner  - If seizure occurs,  ensure patient safety during seizure  - Reorient patient post seizure  - Seizure pads on all 4 side rails  - Instruct patient/family to notify RN of any seizure activity including if an aura is experienced  - Instruct patient/family to call for assistance with activity based on nursing assessment  - Administer anti-seizure medications if ordered    Outcome: Progressing  Goal: Achieves maximal functionality and self care  Description: INTERVENTIONS  - Monitor swallowing and airway patency with patient fatigue and changes in neurological status  - Encourage and assist patient to increase activity and self care.   - Encourage visually impaired, hearing impaired and aphasic patients to use assistive/communication devices  Outcome: Progressing

## 2024-03-09 NOTE — ED PROVIDER NOTES
History  Chief Complaint   Patient presents with    Fall    Dizziness     Fell today because she felt dizzy. Did not pass out, + head strike, no thinners. L pupil also looks smaller than R pupil.        History provided by:  Patient  Fall  Mechanism of injury: fall    Injury location:  Head/neck  Head/neck injury location:  Head  Incident location:  Home  Time since incident:  1 hour  Arrived directly from scene: yes    Fall:     Fall occurred:  Standing    Impact surface:  Hard floor    Point of impact:  Head    Entrapped after fall: no    Suspicion of alcohol use: no    Suspicion of drug use: no    Prior to arrival data:     Patient ambulatory at scene: yes      Blood loss:  None    Responsiveness at scene:  Alert    Orientation at scene:  Person, place, situation and time    Loss of consciousness: no      Amnesic to event: no    Associated symptoms: no abdominal pain, no back pain, no blindness, no chest pain, no difficulty breathing, no headaches, no hearing loss, no loss of consciousness, no nausea, no neck pain, no seizures and no vomiting    Risk factors: no AICD, no anticoagulation therapy, no asthma, no beta blocker therapy, no CABG, no CAD, no CHF, no COPD, no diabetes, no dialysis, no hemophilia, no kidney disease, no pacemaker, no past MI and no steroid use    Dizziness  Quality:  Lightheadedness and imbalance  Severity:  Moderate  Onset quality:  Gradual  Duration:  2 weeks  Timing:  Intermittent  Progression:  Waxing and waning  Chronicity:  New  Context: bending over, eye movement, head movement, physical activity and standing up    Context: not with bowel movement, not with ear pain, not with inactivity, not with loss of consciousness, not with medication and not when urinating    Relieved by:  Being still  Worsened by:  Eye movement and standing up  Ineffective treatments:  Being still  Associated symptoms: no blood in stool, no chest pain, no headaches, no hearing loss, no nausea, no palpitations,  no shortness of breath and no vomiting    Risk factors: multiple medications    Risk factors: no anemia, no heart disease, no hx of stroke, no hx of vertigo, no Meniere's disease and no new medications        Prior to Admission Medications   Prescriptions Last Dose Informant Patient Reported? Taking?   Cholecalciferol (VITAMIN D) 50 MCG (2000 UT) CAPS Not Taking Self No No   Sig: Take one capsule every other day   Patient not taking: Reported on 3/9/2024   LOTEMAX 0.5 % ophth gel Not Taking Self Yes No   Sig: INSTILL 1 DROP 3 TIMES A DAY INTO THE LEFT EYE.   Patient not taking: Reported on 4/25/2023   Omega-3 Fatty Acids (FISH OIL) 1200 MG CAPS Not Taking Self Yes No   Sig: Take 1,200 mg by mouth daily With 360mg omega 3   Patient not taking: Reported on 3/9/2024   alendronate (FOSAMAX) 70 mg tablet Not Taking Self No No   Sig: TAKE 1 TABLET EVERY 7 DAYS WITH FULL GLASS OF WATER ON AN EMPTY STOMACH, STAY UPRIGHT FOR 30 MINUTES, ONCE A WEEK.   Patient not taking: Reported on 3/9/2024   hydroxychloroquine (PLAQUENIL) 200 mg tablet   No No   Sig: Take 1 tablet (200 mg total) by mouth daily   Patient not taking: Reported on 4/25/2023   ibuprofen (MOTRIN) 200 mg tablet Not Taking Self Yes No   Patient not taking: Reported on 3/9/2024   leflunomide (ARAVA) 20 MG tablet Not Taking Self No No   Sig: TAKE 1 TABLET DAILY   Patient not taking: Reported on 4/25/2023   neomycin-polymyxin-hydrocortisone (CORTISPORIN) 0.35%-10,000 units/mL-1% otic suspension Not Taking Self No No   Sig: Administer 4 drops to the right ear 3 (three) times a day   Patient not taking: Reported on 4/25/2023   phenytoin (DILANTIN) 100 mg ER capsule  Self Yes No   Sig: Take by mouth 3 (three) times a day.   Patient not taking: Reported on 4/25/2023   phenytoin (DILANTIN) 100 mg ER capsule 3/9/2024 Self Yes Yes   Sig: Take 100 mg by mouth every 8 (eight) hours      Facility-Administered Medications: None       Past Medical History:   Diagnosis Date     Arthritis     Hyperparathyroidism (HCC)     Osteoporosis     Pollen allergy     Seizure disorder (HCC)        Past Surgical History:   Procedure Laterality Date    BUNIONECTOMY      PARATHYROID GLAND SURGERY      GA PARATHYROIDECTOMY/EXPLORATION PARATHYROIDS Left 02/25/2016    Procedure: MINIMALLY INVASIVE PARATHYROIDECTOMY; PTH MONITORING;  Surgeon: Erlin Camarena MD;  Location: [facility];  Service: Surgical Oncology    THYROID SURGERY      TUBAL LIGATION         Family History   Problem Relation Age of Onset    Thyroid disease Daughter      I have reviewed and agree with the history as documented.    E-Cigarette/Vaping     E-Cigarette/Vaping Substances     Social History     Tobacco Use    Smoking status: Former     Current packs/day: 0.50     Types: Cigarettes    Smokeless tobacco: Never   Substance Use Topics    Alcohol use: Yes     Comment: social    Drug use: No       Review of Systems   Constitutional:  Positive for activity change. Negative for appetite change, chills, diaphoresis, fatigue and fever.   HENT:  Positive for postnasal drip, rhinorrhea and sinus pressure. Negative for congestion, ear discharge, ear pain, hearing loss and sore throat.    Eyes:  Negative for blindness, photophobia, pain, discharge, redness, itching and visual disturbance.   Respiratory:  Negative for cough, chest tightness and shortness of breath.    Cardiovascular:  Negative for chest pain, palpitations and leg swelling.   Gastrointestinal:  Negative for abdominal pain, anal bleeding, blood in stool, nausea and vomiting.   Genitourinary:  Negative for dysuria, frequency, hematuria and urgency.   Musculoskeletal:  Negative for back pain and neck pain.   Skin:  Negative for color change, pallor and rash.   Neurological:  Positive for dizziness. Negative for seizures, loss of consciousness and headaches.   Psychiatric/Behavioral:  Negative for confusion.    All other systems reviewed and are negative.      Physical Exam  Physical  Exam  Vitals and nursing note reviewed.   Constitutional:       General: She is not in acute distress.     Appearance: Normal appearance. She is normal weight. She is not ill-appearing or toxic-appearing.   HENT:      Head: Normocephalic and atraumatic.      Right Ear: Tympanic membrane, ear canal and external ear normal.      Left Ear: Tympanic membrane, ear canal and external ear normal.      Nose: No congestion or rhinorrhea.      Mouth/Throat:      Pharynx: No oropharyngeal exudate or posterior oropharyngeal erythema.   Eyes:      General:         Right eye: No discharge.         Left eye: No discharge.      Extraocular Movements: Extraocular movements intact.      Conjunctiva/sclera: Conjunctivae normal.      Comments: When patient looked to the left, she swayed with the eye movement.  Faint horizontal nystagmus.  Pupils are equal.  The left pupil is 2 mm larger than the right.  6 mm on the left, 4 mm on the right.   Cardiovascular:      Rate and Rhythm: Normal rate and regular rhythm.      Heart sounds: Normal heart sounds. No murmur heard.     No friction rub. No gallop.   Pulmonary:      Effort: Pulmonary effort is normal.      Breath sounds: Normal breath sounds.   Musculoskeletal:         General: Normal range of motion.      Cervical back: Neck supple. No rigidity or tenderness.      Right lower leg: No edema.      Left lower leg: No edema.      Comments: Motor 5/5   Lymphadenopathy:      Cervical: No cervical adenopathy.   Skin:     General: Skin is warm.      Capillary Refill: Capillary refill takes less than 2 seconds.   Neurological:      General: No focal deficit present.      Mental Status: She is alert and oriented to person, place, and time. Mental status is at baseline.      Cranial Nerves: No cranial nerve deficit.      Sensory: No sensory deficit.      Motor: No weakness.      Gait: Gait abnormal.      Comments: Heel-to-shin is normal.  Finger-to-nose is normal.   Psychiatric:         Mood and  Affect: Mood normal.         Behavior: Behavior normal.         Thought Content: Thought content normal.         Judgment: Judgment normal.         Vital Signs  ED Triage Vitals   Temperature Pulse Respirations Blood Pressure SpO2   03/09/24 1150 03/09/24 1150 03/09/24 1150 03/09/24 1150 03/09/24 1150   97.7 °F (36.5 °C) 71 16 146/65 95 %      Temp Source Heart Rate Source Patient Position - Orthostatic VS BP Location FiO2 (%)   03/09/24 1150 03/09/24 1150 03/09/24 1150 03/09/24 1150 --   Oral Monitor Sitting Right arm       Pain Score       03/09/24 1705       6           Vitals:    03/09/24 1430 03/09/24 1500 03/09/24 1530 03/09/24 1634   BP: 134/79 145/74 152/88 (!) 129/107   Pulse: 65 61 65 71   Patient Position - Orthostatic VS: Sitting Sitting Sitting          Visual Acuity  Visual Acuity      Flowsheet Row Most Recent Value   L Pupil Size (mm) 3   R Pupil Size (mm) 4            ED Medications  Medications   sodium chloride 0.9 % infusion (100 mL/hr Intravenous New Bag 3/9/24 1724)   levETIRAcetam (KEPPRA) oral solution 500 mg (has no administration in time range)   acetaminophen (TYLENOL) tablet 650 mg (has no administration in time range)   enoxaparin (LOVENOX) subcutaneous injection 40 mg (has no administration in time range)   sodium chloride 0.9 % bolus 1,000 mL (0 mL Intravenous Stopped 3/9/24 1546)   iohexol (OMNIPAQUE) 350 MG/ML injection (MULTI-DOSE) 85 mL (85 mL Intravenous Given 3/9/24 1418)   influenza vaccine, quadrivalent (FLUARIX) IM injection 0.5 mL (0.5 mL Intramuscular Given 3/9/24 1731)       Diagnostic Studies  Results Reviewed       Procedure Component Value Units Date/Time    HS Troponin I 4hr [[id number]] Collected: 03/09/24 1744    Lab Status: In process Specimen: Blood from Arm, Left Updated: 03/09/24 1750    Hemoglobin A1C w/ EAG Estimation [[id number]] Collected: 03/09/24 1218    Lab Status: In process Specimen: Blood from Arm, Right Updated: 03/09/24 1704    HS Troponin I 2hr  [[id number]]  (Normal) Collected: 03/09/24 1559    Lab Status: Final result Specimen: Blood from Line, Venous Updated: 03/09/24 1635     hs TnI 2hr 4 ng/L      Delta 2hr hsTnI 1 ng/L     HS Troponin 0hr (reflex protocol) [[id number]]  (Normal) Collected: 03/09/24 1345    Lab Status: Final result Specimen: Blood from Arm, Right Updated: 03/09/24 1414     hs TnI 0hr 3 ng/L     Urine Microscopic [[id number]]  (Normal) Collected: 03/09/24 1252    Lab Status: Final result Specimen: Urine, Clean Catch Updated: 03/09/24 1324     RBC, UA 1-2 /hpf      WBC, UA None Seen /hpf      Epithelial Cells Occasional /hpf      Bacteria, UA None Seen /hpf     UA w Reflex to Microscopic w Reflex to Culture [[id number]]  (Abnormal) Collected: 03/09/24 1252    Lab Status: Final result Specimen: Urine, Clean Catch Updated: 03/09/24 1323     Color, UA Light Yellow     Clarity, UA Clear     Specific Gravity, UA 1.009     pH, UA 5.5     Leukocytes, UA Negative     Nitrite, UA Negative     Protein, UA Negative mg/dl      Glucose, UA Negative mg/dl      Ketones, UA Negative mg/dl      Urobilinogen, UA <2.0 mg/dl      Bilirubin, UA Negative     Occult Blood, UA Trace    Phenytoin level, total [[id number]]  (Abnormal) Collected: 03/09/24 1218    Lab Status: Final result Specimen: Blood from Arm, Right Updated: 03/09/24 1323     Phenytoin Lvl 50.6 ug/mL     Narrative:      Repeated value    Comprehensive metabolic panel [[id number]]  (Abnormal) Collected: 03/09/24 1218    Lab Status: Final result Specimen: Blood from Arm, Right Updated: 03/09/24 1300     Sodium 138 mmol/L      Potassium 4.3 mmol/L      Chloride 105 mmol/L      CO2 28 mmol/L      ANION GAP 5 mmol/L      BUN 16 mg/dL      Creatinine 0.64 mg/dL      Glucose 93 mg/dL      Calcium 9.3 mg/dL      AST 13 U/L      ALT 10 U/L      Alkaline Phosphatase 192 U/L      Total Protein 7.4 g/dL      Albumin 4.1 g/dL      Total Bilirubin 0.28 mg/dL      eGFR 95 ml/min/1.73sq m     Narrative:       [employer] guidelines for Chronic Kidney Disease (CKD):     Stage 1 with normal or high GFR (GFR > 90 mL/min/1.73 square meters)    Stage 2 Mild CKD (GFR = 60-89 mL/min/1.73 square meters)    Stage 3A Moderate CKD (GFR = 45-59 mL/min/1.73 square meters)    Stage 3B Moderate CKD (GFR = 30-44 mL/min/1.73 square meters)    Stage 4 Severe CKD (GFR = 15-29 mL/min/1.73 square meters)    Stage 5 End Stage CKD (GFR <15 mL/min/1.73 square meters)  Note: GFR calculation is accurate only with a steady state creatinine    Protime-INR [[id number]]  (Normal) Collected: 03/09/24 1218    Lab Status: Final result Specimen: Blood from Arm, Right Updated: 03/09/24 1249     Protime 13.6 seconds      INR 0.98    APTT [[id number]]  (Normal) Collected: 03/09/24 1218    Lab Status: Final result Specimen: Blood from Arm, Right Updated: 03/09/24 1249     PTT 32 seconds     CBC and differential [[id number]] Collected: 03/09/24 1218    Lab Status: Final result Specimen: Blood from Arm, Right Updated: 03/09/24 1233     WBC 5.68 Thousand/uL      RBC 4.46 Million/uL      Hemoglobin 12.5 g/dL      Hematocrit 39.5 %      MCV 89 fL      MCH 28.0 pg      MCHC 31.6 g/dL      RDW 13.6 %      MPV 8.9 fL      Platelets 323 Thousands/uL      nRBC 0 /100 WBCs      Neutrophils Relative 61 %      Immat GRANS % 0 %      Lymphocytes Relative 31 %      Monocytes Relative 7 %      Eosinophils Relative 0 %      Basophils Relative 1 %      Neutrophils Absolute 3.45 Thousands/µL      Immature Grans Absolute 0.02 Thousand/uL      Lymphocytes Absolute 1.73 Thousands/µL      Monocytes Absolute 0.41 Thousand/µL      Eosinophils Absolute 0.02 Thousand/µL      Basophils Absolute 0.05 Thousands/µL     Fingerstick Glucose (POCT) [[id number]]  (Normal) Collected: 03/09/24 1155    Lab Status: Final result Updated: 03/09/24 1155     POC Glucose 104 mg/dl                    CTA head and neck with and without contrast   ED Interpretation by Alma Womack  Gutzweiler, PA-C (03/09 1431)   CTA head and neck with and without contrast  Status: Final result    PACS Images     Show images for CTA head and neck with and without contrast  Study Result    Narrative & Impression  CTA NECK AND BRAIN WITH AND WITHOUT CONTRAST     INDICATION: unequal pupils, lightheaded, fall, positive head strike, no LOC     COMPARISON:   CT from 2/24/2009     TECHNIQUE:  Routine CT imaging of the Brain without contrast.  Post contrast imaging was performed after administration of iodinated contrast through the neck and brain. Post contrast axial 0.625 mm images timed to opacify the arterial system.     3D rendering was performed on an independent workstation.   MIP reconstructions performed. Coronal reconstructions were performed of the noncontrast portion of the brain.     Radiation dose length product (DLP) for this visit:  2238 mGy-cm .  This examination, like all CT scans performed in the [facility], was performed utilizing techniques to minimize radiation dose exposure, inc   luding the use of iterative   reconstruction and automated exposure control.     IV Contrast:  85 mL of iohexol (OMNIPAQUE)     IMAGE QUALITY:   Diagnostic     FINDINGS:  NONCONTRAST BRAIN  PARENCHYMA:  No intracranial mass, mass effect or midline shift. No CT signs of acute infarction.  No acute parenchymal hemorrhage.     VENTRICLES AND EXTRA-AXIAL SPACES:  Normal for the patient's age.     VISUALIZED ORBITS: Normal visualized orbits.     PARANASAL SINUSES: Subtotal opacification of the left sphenoid sinus. There is chronic osteitis.     CERVICAL VASCULATURE  AORTIC ARCH AND GREAT VESSELS:  Normal aortic arch and great vessel origins. Normal visualized subclavian vessels.     RIGHT VERTEBRAL ARTERY CERVICAL SEGMENT:  Normal origin. The vessel is normal in caliber throughout the neck.     LEFT VERTEBRAL ARTERY CERVICAL SEGMENT:  Normal origin. The vessel is normal in caliber throughout the neck.      RIGHT EXTRACRANIAL CAROTID SEGMENT:  Normal caliber common carotid artery.  Normal bi   furcation and cervical internal carotid artery.  No stenosis or dissection.     LEFT EXTRACRANIAL CAROTID SEGMENT:  Normal caliber common carotid artery.  Normal bifurcation and cervical internal carotid artery.  No stenosis or dissection.     NASCET criteria was used to determine the degree of internal carotid artery diameter stenosis.        INTRACRANIAL VASCULATURE  INTERNAL CAROTID ARTERIES:  Normal enhancement of the intracranial portions of the internal carotid arteries.  Normal ophthalmic artery origins.  Normal ICA terminus. Triangular-shaped infundibulum along the supraclinoid segment of the right ICA.     ANTERIOR CIRCULATION:  Symmetric A1 segments and anterior cerebral arteries with normal enhancement.  Normal anterior communicating artery.     MIDDLE CEREBRAL ARTERY CIRCULATION:  M1 segment and middle cerebral artery branches demonstrate normal enhancement bilaterally.     DISTAL VERTEBRAL ARTERIES:  Normal distal vertebral arteries.  Posterior inferior cerebellar artery    origins are normal. Normal vertebral basilar junction.     BASILAR ARTERY:  Basilar artery is normal in caliber.  Normal superior cerebellar arteries.     POSTERIOR CEREBRAL ARTERIES: Both posterior cerebral arteries arises from the basilar tip.  Both arteries demonstrate normal enhancement.   Normal posterior communicating arteries.     VENOUS STRUCTURES:  Normal.        NON VASCULAR ANATOMY  BONY STRUCTURES:  No acute osseous abnormality.     SOFT TISSUES OF THE NECK: There are low-attenuation thyroid nodules bilaterally.     THORACIC INLET:  Normal.        IMPRESSION:     No mass effect, acute intracranial hemorrhage or evidence of recent infarction.     No evidence for high-grade stenosis, focal occlusion or vascular aneurysm of the cervical or intracranial vessels.     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                      Final Result by Dae Dallas MD (03/09 1427)      No mass effect, acute intracranial hemorrhage or evidence of recent infarction.      No evidence for high-grade stenosis, focal occlusion or vascular aneurysm of the cervical or intracranial vessels.      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            Workstation performed: [id number]         XR chest 1 view portable   ED Interpretation by Julie Lynn Gutzweiler, PA-C (03/09 1253)   Flattening of the hemidiaphragms concerning for COPD.  No acute cardiopulmonary disease.      Final Result by Reinaldo Bermudez MD (03/09 1257)      No acute cardiopulmonary disease.      Pulmonary hyperinflation with flattened contour of diaphragm which may indicate COPD.      Workstation performed: [id number]         MRI inpatient order    (Results Pending)              Procedures  ECG 12 Lead Documentation Only    Date/Time: 3/9/2024 2:39 PM    Performed by: Julie Lynn Gutzweiler, PA-C  Authorized by: Julie Lynn Gutzweiler, PA-C    Indications / Diagnosis:  Ataxia, dilantin toxicity  ECG reviewed by me, the ED Provider: yes    Patient location:  ED  Previous ECG:     Previous ECG:  Compared to current    Comparison ECG info:  8/24/2012    Similarity:  Changes noted    Comparison to cardiac monitor: Yes    Interpretation:     Interpretation: abnormal    Rate:     ECG rate:  61    ECG rate assessment: normal    Rhythm:     Rhythm: sinus rhythm    Ectopy:     Ectopy: none    QRS:     QRS axis:  Normal    QRS intervals:  Wide  Conduction:     Conduction: abnormal      Abnormal conduction: incomplete RBBB    ST segments:     ST segments:  Abnormal    Elevation:  V1 and V2  T waves:     T waves: inverted      Inverted:  II, III, aVF, V1, V2, V3, V4, V5 and V6  Comments:      QT interval was 414/416.  Incomplete at right bundle branch block is old.  The inverted T waves and ST elevation in V1 V2 is new.  Patient is asymptomatic and has a normal  troponin.  No STEMI alert at this time.  I spoke to Dr. Charlton as well as Dr. Woodard, we feel that this is secondary to the Dilantin toxicity.  Will observe for now.    Independently evaluated by me, Dr. Escobar,Toxicology,  and Dr. Woodard.  CriticalCare Time    Date/Time: 3/9/2024 2:57 PM    Performed by: Julie Lynn Gutzweiler, PA-C  Authorized by: Julie Lynn Gutzweiler, PA-C    Critical care provider statement:     Critical care time (minutes):  45    Critical care time was exclusive of:  Separately billable procedures and treating other patients    Critical care was necessary to treat or prevent imminent or life-threatening deterioration of the following conditions:  CNS failure or compromise (dilantin toxicity, CNS and cardiac changes)    Critical care was time spent personally by me on the following activities:  Obtaining history from patient or surrogate, development of treatment plan with patient or surrogate, discussions with consultants, evaluation of patient's response to treatment, ordering and performing treatments and interventions, ordering and review of laboratory studies, ordering and review of radiographic studies, re-evaluation of patient's condition, review of old charts and examination of patient    I assumed direction of critical care for this patient from another provider in my specialty: yes (Dr Woodard and Dr Escobar)             ED Course  ED Course as of 03/09/24 1811   Sat Mar 09, 2024   1418 I spoke to the patient in regards to her medications.  The only medications that she is presently taking is Dilantin extended release 100 mg 3 times a day.  She is also taking MegaKrill oil.  She stopped the Plaquenil as well as the Arava that she was taking for her inflammatory arthritis.  Patient denies any history of depression, suicidal ideation or plan.  She denies any overdose of the medication.  This is an accidental overdose toxicity.                                             Medical Decision  Making  Patient presents emergency room because over the past 2 weeks she has noticed difficulty walking.  She feels lightheaded with change of position position and activity.  She has noticed that her left pupil is slightly larger than her right.  She denies any headaches, vision changes, chest pain or shortness of breath.  She was seen and evaluated.  She is positive for gait ataxia.  She does have positive mild horizontal nystagmus.  Her left pupil is 6 mm her right pupil is 4 mm.  They react to light.  The remainder of her exam is normal.  Her laboratory test were taken and were reviewed.  Her EKG showed some ST elevation in V1 and V2 with inverted T waves in the inferior anterior leads and lateral leads.  This is most likely secondary to her Dilantin toxicity.  Her troponin was normal range.  Will plan to serial a 2-hour and 4-hour troponin.  A CTA of the head and neck were negative for any acute process.  I did personally speak to Dr. Escobar from toxicology.  Patient will warrant a admission to the telemetry floor for observation.  Will intravenously hydrate her and monitor her for any potential arrhythmias.  Critical care time was 45 minutes.  Please see note.    Amount and/or Complexity of Data Reviewed  Labs: ordered.     Details: Independently interpreted by me  Radiology: ordered and independent interpretation performed.     Details: Portable chest x-ray was independently interpreted by me, flattening of the hemidiaphragms suggesting COPD.  No acute cardiopulmonary disease.  ECG/medicine tests: ordered and independent interpretation performed.     Details: ST elevation in lead V1 and V2 concerning for ischemia.  Also T wave inversion in the inferior, anterior, lateral leads.  This is new from a previous changing.  Patient has an old right incomplete bundle branch block.    Risk  Prescription drug management.  Decision regarding hospitalization.             Disposition  Final diagnoses:   Dilantin toxicity    Ataxia   Nystagmus   History of recent fall   Hematoma - scalp/left occipital     Time reflects when diagnosis was documented in both MDM as applicable and the Disposition within this note       Time User Action Codes Description Comment    3/9/2024  1:42 PM Gutzweiler, Julie Add [T42.0X1A] Dilantin toxicity     3/9/2024  2:53 PM Gutzweiler, Julie Add [R27.0] Ataxia     3/9/2024  2:53 PM Gutzweiler, Julie Add [H55.00] Nystagmus     3/9/2024  2:53 PM Gutzweiler, Julie Add [Z91.81] History of recent fall     3/9/2024  2:53 PM Gutzweiler, Julie Add [T14.8XXA] Hematoma     3/9/2024  2:53 PM Gutzweiler, Julie Modify [T14.8XXA] Hematoma scalp    3/9/2024  2:54 PM Gutzweiler, Julie Modify [T14.8XXA] Hematoma scalp/left occipital          ED Disposition       ED Disposition   Admit    Condition   Stable    Date/Time   Sat Mar 9, 2024  2:55 PM    Comment   Case was discussed with Dr Resendiz and the patient's admission status was agreed to be Admission Status: inpatient status to the service of Dr. Resendiz .               Follow-up Information    None         Current Discharge Medication List        CONTINUE these medications which have NOT CHANGED    Details   !! phenytoin (DILANTIN) 100 mg ER capsule Take 100 mg by mouth every 8 (eight) hours      alendronate (FOSAMAX) 70 mg tablet TAKE 1 TABLET EVERY 7 DAYS WITH FULL GLASS OF WATER ON AN EMPTY STOMACH, STAY UPRIGHT FOR 30 MINUTES, ONCE A WEEK.  Qty: 12 tablet, Refills: 3    Associated Diagnoses: Osteoporosis without current pathological fracture, unspecified osteoporosis type      Cholecalciferol (VITAMIN D) 50 MCG (2000 UT) CAPS Take one capsule every other day    Associated Diagnoses: Hyperparathyroidism (HCC); Osteoporosis without current pathological fracture, unspecified osteoporosis type      hydroxychloroquine (PLAQUENIL) 200 mg tablet Take 1 tablet (200 mg total) by mouth daily  Qty: 90 tablet, Refills: 3    Associated Diagnoses: Rheumatoid arthritis involving  multiple sites, unspecified rheumatoid factor presence      ibuprofen (MOTRIN) 200 mg tablet       leflunomide (ARAVA) 20 MG tablet TAKE 1 TABLET DAILY  Qty: 90 tablet, Refills: 0    Associated Diagnoses: Rheumatoid arthritis involving multiple sites, unspecified rheumatoid factor presence      LOTEMAX 0.5 % ophth gel INSTILL 1 DROP 3 TIMES A DAY INTO THE LEFT EYE.  Refills: 1      neomycin-polymyxin-hydrocortisone (CORTISPORIN) 0.35%-10,000 units/mL-1% otic suspension Administer 4 drops to the right ear 3 (three) times a day  Qty: 10 mL, Refills: 0    Associated Diagnoses: Acute otitis externa of right ear, unspecified type      Omega-3 Fatty Acids (FISH OIL) 1200 MG CAPS Take 1,200 mg by mouth daily With 360mg omega 3      !! phenytoin (DILANTIN) 100 mg ER capsule Take by mouth 3 (three) times a day.       !! - Potential duplicate medications found. Please discuss with provider.          No discharge procedures on file.    PDMP Review       None            ED Provider  Electronically Signed by             Julie Lynn Gutzweiler, PA-C  03/09/24 [id number]

## 2024-03-09 NOTE — ASSESSMENT & PLAN NOTE
Patient has osteoporosis history but does not take bisphosphonate anymore  She does not take calcium and vitamin D as well.

## 2024-03-09 NOTE — CONSULTS
NEUROLOGY RESIDENCY CONSULT NOTE     Name: Leighann Dolan   Age & Sex: 63 y.o. female   MRN: [id number]  Unit/Bed#: ED-39   Encounter: [id number]  Length of Stay: 0    ASSESSMENT & PLAN     Phenytoin toxicity  Assessment & Plan  62yo F w/ a PMH of hyperparathyroidism, seronegative RA not on medications, arthritis, seizures on phenytoin coming in for having multiple falls and felt like she was constantly staggering and walking like she was drunk.  She fell this AM due to her imbalance. She has falls 1-2 falls/month although she also has arthritis in R ankle. She said it been ongoing for the past 2 weeks. She mentioned she had similar issues when her dilantin level was too high 40 years ago. She was attempted to be weaned off the dilantin but had seizures as a result but was not put on a replacement ASM.   Compliant w/ meds and no recent changes w/ medications. Has been on dilantin 100mg TID for the past 40 years w/o changes.       Exam:   R upward, horizontal, and downward nystagmus , Was veering to the side when walking, Some mild/very subtle ataxia in LUE; Mild piano fingers in LUE       W/U:   UA bland   Phenytoin level: 50. Levels   CMP WNL except for elevated Alk Phos    CTA H/N: No mass effect, acute intracranial hemorrhage or evidence of recent infarction. no evidence for high-grade stenosis, focal occlusion or vascular aneurysm of the cervical or intracranial vessels.    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         Impression: Concern phenytoin toxicity given falls, LUE ataxia, nystagmus, and elevated phenytoin level and would be best to r/o structural changes due to long term side effects of phenytoin        Plan   D/w attending neurologist Dr. Carson   - open to changing to different medication as long as no seizures and minimal side effects  - will start keppra 500mg BID for seizure medications starting tonight 03/09   - discussed mood effects w/ pt   - hold phenytoin at this  time   - MRI brain w/ and w/o contrast   - fall precautions   - seizure precautions   - CTH for any acute changes in neuro exam   - rest of care as per primary           Leighann Dolan will need follow up in 6-8 weeks  with epilepsy attending/AP/resident .  She will not require outpatient neurological testing.    This should be completed prior to removing patient from list or discharge     SUBJECTIVE     Reason for Consult / Principal Problem: ataxia, phenytoin tox  Hx and PE limited by: none    HPI: Leighann Dolan is a 63 y.o.  female w/ a PMH of hyperparathyroidism, seronegative RA not on medications, arthritis, seizures on phenytoin coming in for having multiple falls and felt like she was constantly staggering and walking like she was drunk.  She fell this AM due to her imbalance. She has falls 1-2 falls/month although she also has arthritis in R ankle. She said it been ongoing for the past 2 weeks. She mentioned she had similar issues when her dilantin level was too high 40 years ago. She was attempted to be weaned off the dilantin but had seizures as a result but was not put on a replacement ASM. She's maintained dilantin 100mg TID for 40 years.    She was diagnosed a seizure when she was 9/10 yo when she had a first seizure. She was started on phenobarbital initially and then switched to dilantin. She's been taking dilatnin for 50 years. She had Davin Mcbride who was her child neurologist. Her child neurologist then went into psychiatry so followed with her family doctor that prescribed her dilantin and was prescribed it by her family doctor.     She states she feel sometimes her cheeks feel numb but it doesn't feel like it when she touches.   She has not increased her dilantin dose when the 2 week occurred.   She stated she missed the dilantin 2 days ago.     She has had gum recession. Denies any diplopia or vision issues.   SHe denies any headaches, syncope, paresthesias, diplopia, visual changes, sudden  onset weakness, garbled speech, dysarthria/slurring of words,  Loss of bowel or bladder  Denies any kidney issues  No new medications. Has gained about 12 lbs over the past few months.     Special Features  Semiology: her vision stops, L arms starts having abnormal movements and then has to hold onto her LUE and then once stops, she gets full body shaking and has a headache once her shaking stops     Epilepsy Risk Factors:  Abnormal pregnancy:    no  Abnormal birth/:   no  Abnormal Development:   no  Febrile seizures, simple:   no  Febrile seizures, complex:   no  CNS infection:    no  Intellectual disability:    no  Cerebral palsy:    no  Head injury (moderate/severe):  no  CNS neoplasm:    no  CNS malformation:    no  Neurosurgical procedure:   no  Stroke:     no  Alcohol abuse:    no  Drug abuse:     no  Family history Sz/epilepsy:   no  Prior physical/sexual/emotional abuse: no    Prior AEDs:  Phenytoin 100mg TID (9am, 4-6pm, and then before bed)     Prior Evaluation:  - Labs: Phenytoin 50.6 (2024)    Psychiatric History:  Depression: none  Anxiety: none  Psychosis: none  Psychiatric Admissions: none    Last seizure was about 20 years ago unsure about cause.   ETOH: none regularly, occasional glass of wine  Drug use: none  Smoking: quit 1 year ago (previous smoked 1/2ppd previously)    Inpatient consult to Neurology  Consult performed by: Butch Concepcion DO  Consult ordered by: Julie Lynn Gutzweiler, PA-C          Historical Information   Past Medical History:   Diagnosis Date    Arthritis     Hyperparathyroidism (HCC)     Osteoporosis     Pollen allergy     Seizure disorder (HCC)      Past Surgical History:   Procedure Laterality Date    BUNIONECTOMY      PARATHYROID GLAND SURGERY      NC PARATHYROIDECTOMY/EXPLORATION PARATHYROIDS Left 2016    Procedure: MINIMALLY INVASIVE PARATHYROIDECTOMY; PTH MONITORING;  Surgeon: Erlin Camarena MD;  Location: [facility];  Service: Surgical Oncology     THYROID SURGERY      TUBAL LIGATION       Social History   Social History     Substance and Sexual Activity   Alcohol Use Yes    Comment: social     Social History     Substance and Sexual Activity   Drug Use No     E-Cigarette/Vaping     E-Cigarette/Vaping Substances     Social History     Tobacco Use   Smoking Status Former    Current packs/day: 0.50    Types: Cigarettes   Smokeless Tobacco Never     Family History:   Family History   Problem Relation Age of Onset    Thyroid disease Daughter      Meds/Allergies   all current active meds have been reviewed, current meds:   Current Facility-Administered Medications   Medication Dose Route Frequency    levETIRAcetam (KEPPRA) oral solution 500 mg  500 mg Oral Q12H DAVID    sodium chloride 0.9 % infusion  125 mL/hr Intravenous Continuous   , and PTA meds:   Prior to Admission Medications   Prescriptions Last Dose Informant Patient Reported? Taking?   Cholecalciferol (VITAMIN D) 50 MCG (2000 UT) CAPS  Self No No   Sig: Take one capsule every other day   LOTEMAX 0.5 % ophth gel  Self Yes No   Sig: INSTILL 1 DROP 3 TIMES A DAY INTO THE LEFT EYE.   Patient not taking: Reported on 4/25/2023   Omega-3 Fatty Acids (FISH OIL) 1200 MG CAPS  Self Yes No   Sig: Take 1,200 mg by mouth daily With 360mg omega 3   alendronate (FOSAMAX) 70 mg tablet  Self No No   Sig: TAKE 1 TABLET EVERY 7 DAYS WITH FULL GLASS OF WATER ON AN EMPTY STOMACH, STAY UPRIGHT FOR 30 MINUTES, ONCE A WEEK.   hydroxychloroquine (PLAQUENIL) 200 mg tablet   No No   Sig: Take 1 tablet (200 mg total) by mouth daily   Patient not taking: Reported on 4/25/2023   ibuprofen (MOTRIN) 200 mg tablet  Self Yes No   leflunomide (ARAVA) 20 MG tablet  Self No No   Sig: TAKE 1 TABLET DAILY   Patient not taking: Reported on 4/25/2023   neomycin-polymyxin-hydrocortisone (CORTISPORIN) 0.35%-10,000 units/mL-1% otic suspension  Self No No   Sig: Administer 4 drops to the right ear 3 (three) times a day   Patient not taking:  Reported on 2023   phenytoin (DILANTIN) 100 mg ER capsule  Self Yes No   Sig: Take by mouth 3 (three) times a day.   Patient not taking: Reported on 2023   phenytoin (DILANTIN) 100 mg ER capsule  Self Yes No   Sig: Take 100 mg by mouth every 8 (eight) hours      Facility-Administered Medications: None     Allergies   Allergen Reactions    Amoxicillin-Pot Clavulanate     Penicillins Itching and Rash     Specifically Amoxicillin       Review of previous medical records was  completed.       Review of Systems   Constitutional:  Negative for chills and fever.   HENT:  Negative for ear pain and sore throat.    Eyes:  Negative for pain and visual disturbance.   Respiratory:  Negative for cough and shortness of breath.    Cardiovascular:  Negative for chest pain and palpitations.   Gastrointestinal:  Negative for abdominal pain and vomiting.   Genitourinary:  Negative for dysuria and hematuria.   Musculoskeletal:  Negative for arthralgias and back pain.   Skin:  Negative for color change and rash.   Neurological:  Positive for dizziness and light-headedness. Negative for seizures and syncope.   All other systems reviewed and are negative.      OBJECTIVE     Patient ID: Leighann Dolan is a 63 y.o. female.    Vitals:   Vitals:    24 1332 24 1430 24 1500 24 1530   BP: 135/67 134/79 145/74 152/88   BP Location: Left arm Left arm Left arm Left arm   Pulse: 62 65 61 65   Resp: 16 16 16 16   Temp:       TempSrc:       SpO2: 99% 98% 98% 97%      There is no height or weight on file to calculate BMI.     Intake/Output Summary (Last 24 hours) at 3/9/2024 1619  Last data filed at 3/9/2024 1546  Gross per 24 hour   Intake 1000 ml   Output --   Net 1000 ml       Temperature:   Temp (24hrs), Av.7 °F (36.5 °C), Min:97.7 °F (36.5 °C), Max:97.7 °F (36.5 °C)    Temperature: 97.7 °F (36.5 °C)    Invasive Devices:   Invasive Devices       Peripheral Intravenous Line  Duration             Peripheral IV  03/09/24 Proximal;Right;Ventral (anterior) Forearm <1 day                    Physical Exam  Eyes:      Extraocular Movements: EOM normal.      Pupils: Pupils are equal, round, and reactive to light.   Neurological:      Mental Status: She is oriented to person, place, and time.      Motor: Motor strength is normal.     Coordination: Finger-Nose-Finger Test abnormal. Heel to Tate Test normal.      Deep Tendon Reflexes:      Reflex Scores:       Tricep reflexes are 2+ on the right side and 2+ on the left side.       Bicep reflexes are 2+ on the right side and 2+ on the left side.       Brachioradialis reflexes are 2+ on the right side and 2+ on the left side.       Patellar reflexes are 2+ on the right side and 2+ on the left side.       Achilles reflexes are 2+ on the right side and 2+ on the left side.  Psychiatric:         Speech: Speech normal.          Neurologic Exam     Mental Status   Oriented to person, place, and time.   Speech: speech is normal   Able to perform simple calculations.   Able to name object. Able to repeat. Normal comprehension.     Cranial Nerves     CN II   Visual fields full to confrontation.     CN III, IV, VI   Pupils are equal, round, and reactive to light.  Extraocular motions are normal.   CN III: no CN III palsy  CN VI: no CN VI palsy  Nystagmus: right   Nystagmus type: horizontal and vertical    CN V   Facial sensation intact.     CN VII   Facial expression full, symmetric.     CN VIII   CN VIII normal.     CN IX, X   CN IX normal.   CN X normal.     CN XI   CN XI normal.     CN XII   CN XII normal.     R upward, horizontal, and downward nystagmus      Motor Exam   Right arm pronator drift: absent  Left arm pronator drift: absent    Strength   Strength 5/5 throughout.     Sensory Exam   Light touch normal.     Gait, Coordination, and Reflexes     Coordination   Finger to nose coordination: abnormal  Heel to shin coordination: normal    Tremor   Resting tremor: absent  Intention  tremor: absent  Action tremor: absent    Reflexes   Right brachioradialis: 2+  Left brachioradialis: 2+  Right biceps: 2+  Left biceps: 2+  Right triceps: 2+  Left triceps: 2+  Right patellar: 2+  Left patellar: 2+  Right achilles: 2+  Left achilles: 2+  Right plantar: normal  Left plantar: normal  Right Ardon: present  Left Ardon: present  Right ankle clonus: absent  Left ankle clonus: absent  Was veering to the side when walking    Some mild ataxia in LUE   Mild piano fingers in LUE             LABORATORY DATA     Labs: I have personally reviewed pertinent reports.   and I have personally reviewed pertinent films in PACS  Results from last 7 days   Lab Units 03/09/24  1218   WBC Thousand/uL 5.68   HEMOGLOBIN g/dL 12.5   HEMATOCRIT % 39.5   PLATELETS Thousands/uL 323   NEUTROS PCT % 61   MONOS PCT % 7   EOS PCT % 0      Results from last 7 days   Lab Units 03/09/24  1218   POTASSIUM mmol/L 4.3   CHLORIDE mmol/L 105   CO2 mmol/L 28   BUN mg/dL 16   CREATININE mg/dL 0.64   CALCIUM mg/dL 9.3   ALK PHOS U/L 192*   ALT U/L 10   AST U/L 13              Results from last 7 days   Lab Units 03/09/24  1218   INR  0.98   PTT seconds 32               IMAGING & DIAGNOSTIC TESTING     Radiology Results: I have personally reviewed pertinent reports.   and I have personally reviewed pertinent films in PACS  CTA head and neck with and without contrast   ED Interpretation by Julie Lynn Gutzweiler, PA-C (03/09 1851)   CTA head and neck with and without contrast  Status: Final result    PACS Images     Show images for CTA head and neck with and without contrast  Study Result    Narrative & Impression  CTA NECK AND BRAIN WITH AND WITHOUT CONTRAST     INDICATION: unequal pupils, lightheaded, fall, positive head strike, no LOC     COMPARISON:   CT from 2/24/2009     TECHNIQUE:  Routine CT imaging of the Brain without contrast.  Post contrast imaging was performed after administration of iodinated contrast through the neck and brain.  Post contrast axial 0.625 mm images timed to opacify the arterial system.     3D rendering was performed on an independent workstation.   MIP reconstructions performed. Coronal reconstructions were performed of the noncontrast portion of the brain.     Radiation dose length product (DLP) for this visit:  2238 mGy-cm .  This examination, like all CT scans performed in the [facility], was performed utilizing techniques to minimize radiation dose exposure, inc   luding the use of iterative   reconstruction and automated exposure control.     IV Contrast:  85 mL of iohexol (OMNIPAQUE)     IMAGE QUALITY:   Diagnostic     FINDINGS:  NONCONTRAST BRAIN  PARENCHYMA:  No intracranial mass, mass effect or midline shift. No CT signs of acute infarction.  No acute parenchymal hemorrhage.     VENTRICLES AND EXTRA-AXIAL SPACES:  Normal for the patient's age.     VISUALIZED ORBITS: Normal visualized orbits.     PARANASAL SINUSES: Subtotal opacification of the left sphenoid sinus. There is chronic osteitis.     CERVICAL VASCULATURE  AORTIC ARCH AND GREAT VESSELS:  Normal aortic arch and great vessel origins. Normal visualized subclavian vessels.     RIGHT VERTEBRAL ARTERY CERVICAL SEGMENT:  Normal origin. The vessel is normal in caliber throughout the neck.     LEFT VERTEBRAL ARTERY CERVICAL SEGMENT:  Normal origin. The vessel is normal in caliber throughout the neck.     RIGHT EXTRACRANIAL CAROTID SEGMENT:  Normal caliber common carotid artery.  Normal bi   furcation and cervical internal carotid artery.  No stenosis or dissection.     LEFT EXTRACRANIAL CAROTID SEGMENT:  Normal caliber common carotid artery.  Normal bifurcation and cervical internal carotid artery.  No stenosis or dissection.     NASCET criteria was used to determine the degree of internal carotid artery diameter stenosis.        INTRACRANIAL VASCULATURE  INTERNAL CAROTID ARTERIES:  Normal enhancement of the intracranial portions of the internal  carotid arteries.  Normal ophthalmic artery origins.  Normal ICA terminus. Triangular-shaped infundibulum along the supraclinoid segment of the right ICA.     ANTERIOR CIRCULATION:  Symmetric A1 segments and anterior cerebral arteries with normal enhancement.  Normal anterior communicating artery.     MIDDLE CEREBRAL ARTERY CIRCULATION:  M1 segment and middle cerebral artery branches demonstrate normal enhancement bilaterally.     DISTAL VERTEBRAL ARTERIES:  Normal distal vertebral arteries.  Posterior inferior cerebellar artery    origins are normal. Normal vertebral basilar junction.     BASILAR ARTERY:  Basilar artery is normal in caliber.  Normal superior cerebellar arteries.     POSTERIOR CEREBRAL ARTERIES: Both posterior cerebral arteries arises from the basilar tip.  Both arteries demonstrate normal enhancement.   Normal posterior communicating arteries.     VENOUS STRUCTURES:  Normal.        NON VASCULAR ANATOMY  BONY STRUCTURES:  No acute osseous abnormality.     SOFT TISSUES OF THE NECK: There are low-attenuation thyroid nodules bilaterally.     THORACIC INLET:  Normal.        IMPRESSION:     No mass effect, acute intracranial hemorrhage or evidence of recent infarction.     No evidence for high-grade stenosis, focal occlusion or vascular aneurysm of the cervical or intracranial vessels.     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                     Final Result by Dae Dallas MD (03/09 7748)      No mass effect, acute intracranial hemorrhage or evidence of recent infarction.      No evidence for high-grade stenosis, focal occlusion or vascular aneurysm of the cervical or intracranial vessels.      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            Workstation performed: [id number]         XR chest 1 view portable   ED Interpretation by Julie Lynn Gutzweiler, PA-C (03/09 6383)   Flattening of the hemidiaphragms concerning for COPD.  No acute cardiopulmonary  disease.      Final Result by Reinaldo Bermudez MD (03/09 1257)      No acute cardiopulmonary disease.      Pulmonary hyperinflation with flattened contour of diaphragm which may indicate COPD.      Workstation performed: [id number]         MRI inpatient order    (Results Pending)       Other Diagnostic Testing: I have personally reviewed pertinent reports.   and I have personally reviewed pertinent films in PACS    ACTIVE MEDICATIONS     Current Facility-Administered Medications   Medication Dose Route Frequency    levETIRAcetam (KEPPRA) oral solution 500 mg  500 mg Oral Q12H DAVID    sodium chloride 0.9 % infusion  125 mL/hr Intravenous Continuous       Prior to Admission medications    Medication Sig Start Date End Date Taking? Authorizing Provider   alendronate (FOSAMAX) 70 mg tablet TAKE 1 TABLET EVERY 7 DAYS WITH FULL GLASS OF WATER ON AN EMPTY STOMACH, STAY UPRIGHT FOR 30 MINUTES, ONCE A WEEK. 12/13/22   Scott Loya MD   Cholecalciferol (VITAMIN D) 50 MCG (2000 UT) CAPS Take one capsule every other day 1/22/20   Scott Loya MD   hydroxychloroquine (PLAQUENIL) 200 mg tablet Take 1 tablet (200 mg total) by mouth daily  Patient not taking: Reported on 4/25/2023 12/21/18 1/22/20  Caryn Matamoros PA-C   ibuprofen (MOTRIN) 200 mg tablet     Historical Provider, MD   leflunomide (ARAVA) 20 MG tablet TAKE 1 TABLET DAILY  Patient not taking: Reported on 4/25/2023 6/20/19   Caryn Matamoros PA-C   LOTEMAX 0.5 % ophth gel INSTILL 1 DROP 3 TIMES A DAY INTO THE LEFT EYE.  Patient not taking: Reported on 4/25/2023 5/18/18   Historical Provider, MD   neomycin-polymyxin-hydrocortisone (CORTISPORIN) 0.35%-10,000 units/mL-1% otic suspension Administer 4 drops to the right ear 3 (three) times a day  Patient not taking: Reported on 4/25/2023 3/6/23   Devyn Damian MD   Omega-3 Fatty Acids (FISH OIL) 1200 MG CAPS Take 1,200 mg by mouth daily With 360mg omega 3    Historical Provider, MD   phenytoin  (DILANTIN) 100 mg ER capsule Take by mouth 3 (three) times a day.  Patient not taking: Reported on 4/25/2023    Historical Provider, MD   phenytoin (DILANTIN) 100 mg ER capsule Take 100 mg by mouth every 8 (eight) hours 7/26/13   Historical Provider, MD         CODE STATUS & ADVANCED DIRECTIVES     Code Status: No Order  Advance Directive and Living Will:      Power of :    POLST:        VTE Pharmacologic Prophylaxis:  as per primary   VTE Mechanical Prophylaxis: sequential compression device    ==  Butch Concepcion DO   [facility] Neurology Residency, PGY-3

## 2024-03-09 NOTE — ASSESSMENT & PLAN NOTE
63-year-old female with epilepsy on phenytoin, hyperparathyroidism, seronegative RA not on medications, arthritis, presented with multiple falls and ataxic gait.    She fell due to her imbalance. She has falls 1-2 falls/month although she also has arthritis in R ankle. She said it has been ongoing for the past 2 weeks. She stated she had similar issues when her phenytoin level was too high 40 years ago. There was prior attempt to wean her off phenytoin in the past but had seizures as a result but was not put on a replacement AED.   Compliant w/ meds and no recent changes w/ medications. Has been on phenytoin 100mg TID for the past 40 years w/o changes.       Exam:   Multidirectional nystagmus and mild ataxia in LUE      W/U:   -Phenytoin level: 50.   -CMP WNL except for elevated Alk Phos  -MRI brain demonstrated tiny infarct in the right corona radiata vs. artifact. Cerebellar atrophy noted.  -CTA H/N: No mass effect, acute intracranial hemorrhage or evidence of recent infarction. no evidence for high-grade stenosis, focal occlusion or vascular aneurysm of the cervical or intracranial vessels.    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     Ataxia/falls likely due to phenytoin toxicity.   Favor artifact noted on MRI but cannot entirely rule out tiny incidental stroke. Patient agreeable to start ASA and f/u with Neurovascular as outpatient.  Cerebellar atrophy likely related to longstanding phenytoin use.    Plan:   - Continue off phenytoin.  - Check phenytoin levels daily until normalized.  - Started on Keppra 500mg BID on 03/09  - Start ASA 81mg daily  - fall precautions   - seizure precautions   - CTH for any acute changes in neuro exam   - rest of care as per primary

## 2024-03-09 NOTE — ASSESSMENT & PLAN NOTE
Patient has been taking 100 mg phenytoin 3 times a day for the past more than 40 years.  Many years ago, patient had ataxia complicated with fall and patient was try to wean off phenytoin but patient had breakthrough seizure and patient was put on phenytoin 100 mg 3 times a day again.  During this admission, phenytoin level level: 50.6  EKG: ST elevation in lead I and QRS complex widening, possibly changes because of phenytoin, antiarrhythmic class Ib  CTA head unremarkable.    Plan:  Neurology did start Keppra 500 mg twice daily and stoppage of phenytoin.  Follow-up with MRI brain to rule out cerebellar degeneration induced ataxia and nystagmus.  Seizure and fall precautions

## 2024-03-09 NOTE — H&P
[facility]  H&P  Name: Leighann Dolan I  MRN: [id number]  Unit/Bed#: S -01 I Date of Admission: 3/9/2024   Date of Service: 3/9/2024 I Hospital Day: 0    Assessment/Plan   * Phenytoin toxicity  Assessment & Plan  Patient has been taking 100 mg phenytoin 3 times a day for the past more than 40 years.  Many years ago, patient had ataxia complicated with fall and patient was try to wean off phenytoin but patient had breakthrough seizure and patient was put on phenytoin 100 mg 3 times a day again.  During this admission, phenytoin level level: 50.6  EKG: ST elevation in lead I and QRS complex widening, possibly changes because of phenytoin, antiarrhythmic class Ib  CTA head unremarkable.    Plan:  Neurology did start Keppra 500 mg twice daily and stoppage of phenytoin.  Follow-up with MRI brain to rule out cerebellar degeneration induced ataxia and nystagmus.  Seizure and fall precautions      COPD (chronic obstructive pulmonary disease) (HCC)  Assessment & Plan  Quit smoking 1 year ago.  CXR showed hyperinflated lungs, possibly emphysema.    Thyroid nodule  Assessment & Plan  Patient has multinodular goiter.  The most recent [facility] thyroid was unremarkable.    Plan:  Follow-up with thyroid [facility] every 1 year.    Osteoporosis  Assessment & Plan  Patient has osteoporosis history but does not take bisphosphonate anymore  She does not take calcium and vitamin D as well.    Rheumatoid arthritis of multiple sites with negative rheumatoid factor ([facility])  Assessment & Plan  Patient does not take hydroxychloroquine anymore and takes over-the-counter supplement       VTE Pharmacologic Prophylaxis: VTE Score: 3 Lovenox  Code Status: Level 1 - Full Code   Discussion with family:  Will attempt informing patient's family..     Anticipated Length of Stay: Possibly 24-48 hours because of workup for fall    Chief Complaint: Ataxia, imbalance and subsequently fall    History of Present Illness:  Leighann Dolan  is a 63 y.o. female with a PMH of seizure in the childhood status post phenytoin 100 mg 3 times daily for more than 40 years COPD, nodular thyroid, arthritis who presents with ataxia, nystagmus, anisocoria and subsequent fall and hitting the head.    It was a witnessed event and patient did not have strokelike symptoms, seizure-like activities, syncope or was not on any medication apart from phenytoin.    There has been no recent change in the medication.    Approximately 40 years ago, patient had a similar episode and patient was try to wean of from the phenytoin because of suspected phenytoin toxicity but patient had episode of seizure so phenytoin 100 mg 3 times daily was restarted and since then patient is on that.    During this admission, neurology was consulted and they did recommend Keppra 500 mg twice daily and stoppage of creatinine.  CTA head is unremarkable and MRI brain is pending to rule out cerebellar degeneration induced ataxia and nystagmus.    EKG shows NSR with ST segment changes in lead V1 and QRS widening compared to 2012 EKG.  This is possibly because of phenytoin, which is class I antiarrhythmic.    Medical toxicology was consulted for phenytoin toxicity (labs showed 50.6 phenytoin serum level) and medical toxicology recommended stoppage of phenytoin.    Patient has been admitted under telemetry and neurology is on board.    Review of Systems:  Review of Systems   Constitutional:  Negative for appetite change, chills, diaphoresis, fatigue, fever and unexpected weight change.   Respiratory:  Negative for apnea, cough, choking, chest tightness, shortness of breath, wheezing and stridor.    Cardiovascular:  Negative for chest pain, palpitations and leg swelling.   Gastrointestinal:  Negative for abdominal distention, abdominal pain, anal bleeding, blood in stool, constipation, diarrhea, nausea, rectal pain and vomiting.   Genitourinary:  Negative for difficulty urinating.   Skin:  Negative for  color change, pallor and rash.   Neurological:  Positive for dizziness and headaches. Negative for tremors, seizures, syncope, speech difficulty, weakness and numbness.   Psychiatric/Behavioral:  Negative for agitation, behavioral problems, confusion, decreased concentration, dysphoric mood, hallucinations, self-injury and sleep disturbance. The patient is not nervous/anxious and is not hyperactive.        Past Medical and Surgical History:   Past Medical History:   Diagnosis Date    Arthritis     Hyperparathyroidism (HCC)     Osteoporosis     Pollen allergy     Seizure disorder (HCC)        Past Surgical History:   Procedure Laterality Date    BUNIONECTOMY      PARATHYROID GLAND SURGERY      RI PARATHYROIDECTOMY/EXPLORATION PARATHYROIDS Left 02/25/2016    Procedure: MINIMALLY INVASIVE PARATHYROIDECTOMY; PTH MONITORING;  Surgeon: Erlin Camarena MD;  Location: [facility];  Service: Surgical Oncology    THYROID SURGERY      TUBAL LIGATION         Meds/Allergies:  Prior to Admission medications    Medication Sig Start Date End Date Taking? Authorizing Provider   phenytoin (DILANTIN) 100 mg ER capsule Take 100 mg by mouth every 8 (eight) hours 7/26/13  Yes Historical Provider, MD   alendronate (FOSAMAX) 70 mg tablet TAKE 1 TABLET EVERY 7 DAYS WITH FULL GLASS OF WATER ON AN EMPTY STOMACH, STAY UPRIGHT FOR 30 MINUTES, ONCE A WEEK.  Patient not taking: Reported on 3/9/2024 12/13/22   Scott Loya MD   Cholecalciferol (VITAMIN D) 50 MCG (2000 UT) CAPS Take one capsule every other day  Patient not taking: Reported on 3/9/2024 1/22/20   Scott Loya MD   hydroxychloroquine (PLAQUENIL) 200 mg tablet Take 1 tablet (200 mg total) by mouth daily  Patient not taking: Reported on 4/25/2023 12/21/18 1/22/20  Caryn Matamoros PA-C   ibuprofen (MOTRIN) 200 mg tablet     Historical Provider, MD   leflunomide (ARAVA) 20 MG tablet TAKE 1 TABLET DAILY  Patient not taking: Reported on 4/25/2023 6/20/19   Caryn Matamoros  CHINEDU   LOTEMAX 0.5 % ophth gel INSTILL 1 DROP 3 TIMES A DAY INTO THE LEFT EYE.  Patient not taking: Reported on 4/25/2023 5/18/18   Historical Provider, MD   neomycin-polymyxin-hydrocortisone (CORTISPORIN) 0.35%-10,000 units/mL-1% otic suspension Administer 4 drops to the right ear 3 (three) times a day  Patient not taking: Reported on 4/25/2023 3/6/23   Devyn Damian MD   Omega-3 Fatty Acids (FISH OIL) 1200 MG CAPS Take 1,200 mg by mouth daily With 360mg omega 3  Patient not taking: Reported on 3/9/2024    Historical Provider, MD   phenytoin (DILANTIN) 100 mg ER capsule Take by mouth 3 (three) times a day.  Patient not taking: Reported on 4/25/2023    Historical Provider, MD     I have reviewed home medications with patient personally.    Allergies:   Allergies   Allergen Reactions    Amoxicillin-Pot Clavulanate     Penicillins Itching and Rash     Specifically Amoxicillin       Social History:  Marital Status: /Civil Union   Occupation: Retired  Patient Pre-hospital Living Situation: Home  Patient Pre-hospital Level of Mobility: walks  Patient Pre-hospital Diet Restrictions: none  Substance Use History:   Social History     Substance and Sexual Activity   Alcohol Use Yes    Comment: social     Social History     Tobacco Use   Smoking Status Former    Current packs/day: 0.50    Types: Cigarettes   Smokeless Tobacco Never     Social History     Substance and Sexual Activity   Drug Use No       Family History:  Family History   Problem Relation Age of Onset    Thyroid disease Daughter        Physical Exam:     Vitals:   Blood Pressure: (!) 129/107 (03/09/24 1634)  Pulse: 71 (03/09/24 1634)  Temperature: 97.7 °F (36.5 °C) (03/09/24 1150)  Temp Source: Oral (03/09/24 1150)  Respirations: 16 (03/09/24 1530)  SpO2: 99 % (03/09/24 1634)    Physical Exam  Constitutional:       Appearance: Normal appearance. She is normal weight.   Eyes:      General: No scleral icterus.        Right eye: No discharge.          Left eye: No discharge.   Cardiovascular:      Rate and Rhythm: Normal rate and regular rhythm.      Pulses: Normal pulses.      Heart sounds: Normal heart sounds. No murmur heard.     No friction rub. No gallop.   Pulmonary:      Effort: Pulmonary effort is normal. No respiratory distress.      Breath sounds: Normal breath sounds. No wheezing, rhonchi or rales.   Chest:      Chest wall: No tenderness.   Abdominal:      General: There is no distension.      Palpations: There is no mass.      Tenderness: There is no abdominal tenderness.      Hernia: No hernia is present.   Musculoskeletal:      Right lower leg: No edema.      Left lower leg: No edema.   Neurological:      Mental Status: She is oriented to person, place, and time.      Cranial Nerves: No cranial nerve deficit.      Sensory: No sensory deficit.      Motor: No weakness.      Coordination: Coordination normal.      Comments: Left-sided horizontal unidirectional nystagmus.  Anisocoria (right greater than left)   Psychiatric:         Mood and Affect: Mood normal.         Behavior: Behavior normal.         Thought Content: Thought content normal.         Judgment: Judgment normal.         Additional Data:     Lab Results:  Results from last 7 days   Lab Units 03/09/24  1218   WBC Thousand/uL 5.68   HEMOGLOBIN g/dL 12.5   HEMATOCRIT % 39.5   PLATELETS Thousands/uL 323   NEUTROS PCT % 61   LYMPHS PCT % 31   MONOS PCT % 7   EOS PCT % 0     Results from last 7 days   Lab Units 03/09/24  1218   SODIUM mmol/L 138   POTASSIUM mmol/L 4.3   CHLORIDE mmol/L 105   CO2 mmol/L 28   BUN mg/dL 16   CREATININE mg/dL 0.64   ANION GAP mmol/L 5   CALCIUM mg/dL 9.3   ALBUMIN g/dL 4.1   TOTAL BILIRUBIN mg/dL 0.28   ALK PHOS U/L 192*   ALT U/L 10   AST U/L 13   GLUCOSE RANDOM mg/dL 93     Results from last 7 days   Lab Units 03/09/24  1218   INR  0.98     Results from last 7 days   Lab Units 03/09/24  1155   POC GLUCOSE mg/dl 104               Lines/Drains:  Invasive Devices        Peripheral Intravenous Line  Duration             Peripheral IV 03/09/24 Proximal;Right;Ventral (anterior) Forearm <1 day                        Imaging: Reviewed radiology reports from this admission including: chest xray and CTA head  CTA head and neck with and without contrast   ED Interpretation by Julie Lynn Gutzweiler, PA-C (03/09 5821)   CTA head and neck with and without contrast  Status: Final result    PACS Images     Show images for CTA head and neck with and without contrast  Study Result    Narrative & Impression  CTA NECK AND BRAIN WITH AND WITHOUT CONTRAST     INDICATION: unequal pupils, lightheaded, fall, positive head strike, no LOC     COMPARISON:   CT from 2/24/2009     TECHNIQUE:  Routine CT imaging of the Brain without contrast.  Post contrast imaging was performed after administration of iodinated contrast through the neck and brain. Post contrast axial 0.625 mm images timed to opacify the arterial system.     3D rendering was performed on an independent workstation.   MIP reconstructions performed. Coronal reconstructions were performed of the noncontrast portion of the brain.     Radiation dose length product (DLP) for this visit:  2238 mGy-cm .  This examination, like all CT scans performed in the [facility], was performed utilizing techniques to minimize radiation dose exposure, inc   luding the use of iterative   reconstruction and automated exposure control.     IV Contrast:  85 mL of iohexol (OMNIPAQUE)     IMAGE QUALITY:   Diagnostic     FINDINGS:  NONCONTRAST BRAIN  PARENCHYMA:  No intracranial mass, mass effect or midline shift. No CT signs of acute infarction.  No acute parenchymal hemorrhage.     VENTRICLES AND EXTRA-AXIAL SPACES:  Normal for the patient's age.     VISUALIZED ORBITS: Normal visualized orbits.     PARANASAL SINUSES: Subtotal opacification of the left sphenoid sinus. There is chronic osteitis.     CERVICAL VASCULATURE  AORTIC ARCH AND GREAT  VESSELS:  Normal aortic arch and great vessel origins. Normal visualized subclavian vessels.     RIGHT VERTEBRAL ARTERY CERVICAL SEGMENT:  Normal origin. The vessel is normal in caliber throughout the neck.     LEFT VERTEBRAL ARTERY CERVICAL SEGMENT:  Normal origin. The vessel is normal in caliber throughout the neck.     RIGHT EXTRACRANIAL CAROTID SEGMENT:  Normal caliber common carotid artery.  Normal bi   furcation and cervical internal carotid artery.  No stenosis or dissection.     LEFT EXTRACRANIAL CAROTID SEGMENT:  Normal caliber common carotid artery.  Normal bifurcation and cervical internal carotid artery.  No stenosis or dissection.     NASCET criteria was used to determine the degree of internal carotid artery diameter stenosis.        INTRACRANIAL VASCULATURE  INTERNAL CAROTID ARTERIES:  Normal enhancement of the intracranial portions of the internal carotid arteries.  Normal ophthalmic artery origins.  Normal ICA terminus. Triangular-shaped infundibulum along the supraclinoid segment of the right ICA.     ANTERIOR CIRCULATION:  Symmetric A1 segments and anterior cerebral arteries with normal enhancement.  Normal anterior communicating artery.     MIDDLE CEREBRAL ARTERY CIRCULATION:  M1 segment and middle cerebral artery branches demonstrate normal enhancement bilaterally.     DISTAL VERTEBRAL ARTERIES:  Normal distal vertebral arteries.  Posterior inferior cerebellar artery    origins are normal. Normal vertebral basilar junction.     BASILAR ARTERY:  Basilar artery is normal in caliber.  Normal superior cerebellar arteries.     POSTERIOR CEREBRAL ARTERIES: Both posterior cerebral arteries arises from the basilar tip.  Both arteries demonstrate normal enhancement.   Normal posterior communicating arteries.     VENOUS STRUCTURES:  Normal.        NON VASCULAR ANATOMY  BONY STRUCTURES:  No acute osseous abnormality.     SOFT TISSUES OF THE NECK: There are low-attenuation thyroid nodules bilaterally.      THORACIC INLET:  Normal.        IMPRESSION:     No mass effect, acute intracranial hemorrhage or evidence of recent infarction.     No evidence for high-grade stenosis, focal occlusion or vascular aneurysm of the cervical or intracranial vessels.     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                     Final Result by Dae Dallas MD (03/09 1427)      No mass effect, acute intracranial hemorrhage or evidence of recent infarction.      No evidence for high-grade stenosis, focal occlusion or vascular aneurysm of the cervical or intracranial vessels.      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            Workstation performed: [id number]         XR chest 1 view portable   ED Interpretation by Julie Lynn Gutzweiler, PA-C (03/09 1253)   Flattening of the hemidiaphragms concerning for COPD.  No acute cardiopulmonary disease.      Final Result by Reinaldo Bermudez MD (03/09 1257)      No acute cardiopulmonary disease.      Pulmonary hyperinflation with flattened contour of diaphragm which may indicate COPD.      Workstation performed: [id number]         MRI inpatient order    (Results Pending)       EKG and Other Studies Reviewed on Admission:   EKG:  Normal sinus rhythm but ST elevation in lead V1, widening QRS complex compared to 2002 and EKG..    ** Please Note: This note has been constructed using a voice recognition system. **

## 2024-03-09 NOTE — ASSESSMENT & PLAN NOTE
Patient has multinodular goiter.  The most recent USG thyroid was unremarkable.    Plan:  Follow-up with thyroid USG every 1 year.

## 2024-03-09 NOTE — CONSULTS
INTERPROFESSIONAL (PHONE) CONSULTATION - Medical Toxicology  Leighann Dolan 63 y.o. female MRN: [id number]  Unit/Bed#: ED-39 Encounter: [id number]      Reason for Consult / Principal Problem: Phenytoin toxicity  Inpatient consult to Toxicology  Consult performed by: Jorge Escobar DO  Consult ordered by: Julie Lynn Gutzweiler, PA-C        03/09/24      ASSESSMENT:  63-year-old female with phenytoin toxicity, acuity and intention unknown    RECOMMENDATIONS:  Please admit patient and continue supportive care, including cardiac and respiratory monitoring, fall precautions, IV fluid hydration.  Please hold phenytoin.  Her serum phenytoin concentration is 50.6 ng/mL and her renal function and albumin are normal.  She reports phenytoin dosing of extended release formulary 3 times daily and has becoming increasingly ataxic during the last 2 weeks with a recent fall.  There is no specific antidote for phenytoin toxicity and resulting seizures and dysrhythmias are rare given the fast on/off action of phenytoin being a type Ib antidysrhythmic agent.  Please defer to neurology regarding future dosing of phenytoin or alternative treatments.  Please follow-up neuroimaging as differential also includes CVA.  Her EKG is abnormal and is not necessarily related to the phenytoin toxicity so please also evaluate for any cardiac issues.  From a toxicology standpoint regarding the phenytoin toxicity, she can be considered stable when her mental status is normal and she is able to ambulate well, in the setting of a decreasing serum phenytoin concentration, preferably within normal range.    For further questions, please contact the medical  on call via [facility] between 8am and 9pm. If between 9pm and 8am, please reach out to the [facility] at [phone].     Please see additional teaching note below:    Hx and PE limited by the dynamics of a phone consultation. I have not personally interviewed or evaluated the  patient, but only advised based on the information provided to me. Primary provider is responsible for all clinical decisions.     Pertinent history, physical exam and clinical findings and course discussed: Leighann Dolan is a 63 y.o. year old female who presents with ataxia for 2 weeks.  She denied overdose.  She denies taking any other medications other than phenytoin.    Review of systems and physical exam not performed by me.    Historical Information   Past Medical History:   Diagnosis Date    Arthritis     Hyperparathyroidism (HCC)     Osteoporosis     Pollen allergy     Seizure disorder (HCC)      Past Surgical History:   Procedure Laterality Date    BUNIONECTOMY      PARATHYROID GLAND SURGERY      ND PARATHYROIDECTOMY/EXPLORATION PARATHYROIDS Left 02/25/2016    Procedure: MINIMALLY INVASIVE PARATHYROIDECTOMY; PTH MONITORING;  Surgeon: Erlin Camarena MD;  Location: [facility];  Service: Surgical Oncology    THYROID SURGERY      TUBAL LIGATION       Social History   Social History     Substance and Sexual Activity   Alcohol Use Yes    Comment: social     Social History     Substance and Sexual Activity   Drug Use No     Social History     Tobacco Use   Smoking Status Former    Current packs/day: 0.50    Types: Cigarettes   Smokeless Tobacco Never     Family History   Problem Relation Age of Onset    Thyroid disease Daughter         Prior to Admission medications    Medication Sig Start Date End Date Taking? Authorizing Provider   alendronate (FOSAMAX) 70 mg tablet TAKE 1 TABLET EVERY 7 DAYS WITH FULL GLASS OF WATER ON AN EMPTY STOMACH, STAY UPRIGHT FOR 30 MINUTES, ONCE A WEEK. 12/13/22   Scott Loya MD   Cholecalciferol (VITAMIN D) 50 MCG (2000 UT) CAPS Take one capsule every other day 1/22/20   Scott Loya MD   hydroxychloroquine (PLAQUENIL) 200 mg tablet Take 1 tablet (200 mg total) by mouth daily  Patient not taking: Reported on 4/25/2023 12/21/18 1/22/20  Caryn Matamoros PA-C    ibuprofen (MOTRIN) 200 mg tablet     Historical Provider, MD   leflunomide (ARAVA) 20 MG tablet TAKE 1 TABLET DAILY  Patient not taking: Reported on 4/25/2023 6/20/19   Caryn Matamoros PA-C   LOTEMAX 0.5 % ophth gel INSTILL 1 DROP 3 TIMES A DAY INTO THE LEFT EYE.  Patient not taking: Reported on 4/25/2023 5/18/18   Historical Provider, MD   neomycin-polymyxin-hydrocortisone (CORTISPORIN) 0.35%-10,000 units/mL-1% otic suspension Administer 4 drops to the right ear 3 (three) times a day  Patient not taking: Reported on 4/25/2023 3/6/23   Devyn Dmaian MD   Omega-3 Fatty Acids (FISH OIL) 1200 MG CAPS Take 1,200 mg by mouth daily With 360mg omega 3    Historical Provider, MD   phenytoin (DILANTIN) 100 mg ER capsule Take by mouth 3 (three) times a day.  Patient not taking: Reported on 4/25/2023    Historical Provider, MD   phenytoin (DILANTIN) 100 mg ER capsule Take 100 mg by mouth every 8 (eight) hours 7/26/13   Historical Provider, MD       No current facility-administered medications for this encounter.       Allergies   Allergen Reactions    Amoxicillin-Pot Clavulanate     Penicillins Itching and Rash     Specifically Amoxicillin       Objective     No intake or output data in the 24 hours ending 03/09/24 1408    Invasive Devices:   Peripheral IV 03/09/24 Proximal;Right;Ventral (anterior) Forearm (Active)   Site Assessment WDL;Clean;Dry;Intact 03/09/24 1220   Dressing Type Transparent 03/09/24 1220   Line Status Blood return noted;Flushed 03/09/24 1220   Dressing Status Clean;Dry;Intact 03/09/24 1220       Vitals   Vitals:    03/09/24 1150 03/09/24 1332   BP: 146/65 135/67   TempSrc: Oral    Pulse: 71 62   Resp: 16 16   Patient Position - Orthostatic VS:  Sitting   Temp: 97.7 °F (36.5 °C)          EKG, Pathology, and/or Other Studies: I have personally reviewed pertinent reports.        Lab Results: I have personally reviewed pertinent reports.      Labs:    Results from last 7 days   Lab Units 03/09/24  1218  "  WBC Thousand/uL 5.68   HEMOGLOBIN g/dL 12.5   HEMATOCRIT % 39.5   PLATELETS Thousands/uL 323   NEUTROS PCT % 61   LYMPHS PCT % 31   MONOS PCT % 7   EOS PCT % 0      Results from last 7 days   Lab Units 03/09/24  1218   SODIUM mmol/L 138   POTASSIUM mmol/L 4.3   CHLORIDE mmol/L 105   CO2 mmol/L 28   BUN mg/dL 16   CREATININE mg/dL 0.64   CALCIUM mg/dL 9.3   ALK PHOS U/L 192*   ALT U/L 10   AST U/L 13      Results from last 7 days   Lab Units 03/09/24  1218   INR  0.98   PTT seconds 32         No results found for: \"TROPONINI\"          Invalid input(s): \"EXTPREGUR\"      Imaging Studies: I have personally reviewed pertinent reports.      Counseling / Coordination of Care  Total time spent today 23 minutes. This was a phone consultation.       "

## 2024-03-10 ENCOUNTER — APPOINTMENT (INPATIENT)
Dept: MRI IMAGING | Facility: HOSPITAL | Age: 64
DRG: 093 | End: 2024-03-10
Payer: COMMERCIAL

## 2024-03-10 LAB
ANION GAP SERPL CALCULATED.3IONS-SCNC: 6 MMOL/L
BASOPHILS # BLD AUTO: 0.04 THOUSANDS/ÂΜL (ref 0–0.1)
BASOPHILS NFR BLD AUTO: 1 % (ref 0–1)
BUN SERPL-MCNC: 12 MG/DL (ref 5–25)
CALCIUM SERPL-MCNC: 8.5 MG/DL (ref 8.4–10.2)
CHLORIDE SERPL-SCNC: 109 MMOL/L (ref 96–108)
CO2 SERPL-SCNC: 25 MMOL/L (ref 21–32)
CREAT SERPL-MCNC: 0.68 MG/DL (ref 0.6–1.3)
EOSINOPHIL # BLD AUTO: 0.08 THOUSAND/ÂΜL (ref 0–0.61)
EOSINOPHIL NFR BLD AUTO: 1 % (ref 0–6)
ERYTHROCYTE [DISTWIDTH] IN BLOOD BY AUTOMATED COUNT: 13.7 % (ref 11.6–15.1)
GFR SERPL CREATININE-BSD FRML MDRD: 94 ML/MIN/1.73SQ M
GLUCOSE SERPL-MCNC: 94 MG/DL (ref 65–140)
HCT VFR BLD AUTO: 35.1 % (ref 34.8–46.1)
HGB BLD-MCNC: 11 G/DL (ref 11.5–15.4)
IMM GRANULOCYTES # BLD AUTO: 0.02 THOUSAND/UL (ref 0–0.2)
IMM GRANULOCYTES NFR BLD AUTO: 0 % (ref 0–2)
LYMPHOCYTES # BLD AUTO: 2.3 THOUSANDS/ÂΜL (ref 0.6–4.47)
LYMPHOCYTES NFR BLD AUTO: 37 % (ref 14–44)
MCH RBC QN AUTO: 28 PG (ref 26.8–34.3)
MCHC RBC AUTO-ENTMCNC: 31.3 G/DL (ref 31.4–37.4)
MCV RBC AUTO: 89 FL (ref 82–98)
MONOCYTES # BLD AUTO: 0.61 THOUSAND/ÂΜL (ref 0.17–1.22)
MONOCYTES NFR BLD AUTO: 10 % (ref 4–12)
NEUTROPHILS # BLD AUTO: 3.1 THOUSANDS/ÂΜL (ref 1.85–7.62)
NEUTS SEG NFR BLD AUTO: 51 % (ref 43–75)
NRBC BLD AUTO-RTO: 0 /100 WBCS
PHENYTOIN SERPL-MCNC: 39.8 UG/ML (ref 10–20)
PLATELET # BLD AUTO: 274 THOUSANDS/UL (ref 149–390)
PMV BLD AUTO: 8.8 FL (ref 8.9–12.7)
POTASSIUM SERPL-SCNC: 4 MMOL/L (ref 3.5–5.3)
RBC # BLD AUTO: 3.93 MILLION/UL (ref 3.81–5.12)
SODIUM SERPL-SCNC: 140 MMOL/L (ref 135–147)
WBC # BLD AUTO: 6.15 THOUSAND/UL (ref 4.31–10.16)

## 2024-03-10 PROCEDURE — 70551 MRI BRAIN STEM W/O DYE: CPT

## 2024-03-10 PROCEDURE — 80048 BASIC METABOLIC PNL TOTAL CA: CPT

## 2024-03-10 PROCEDURE — 99232 SBSQ HOSP IP/OBS MODERATE 35: CPT | Performed by: STUDENT IN AN ORGANIZED HEALTH CARE EDUCATION/TRAINING PROGRAM

## 2024-03-10 PROCEDURE — 80185 ASSAY OF PHENYTOIN TOTAL: CPT | Performed by: PHYSICIAN ASSISTANT

## 2024-03-10 PROCEDURE — 85025 COMPLETE CBC W/AUTO DIFF WBC: CPT

## 2024-03-10 PROCEDURE — 99233 SBSQ HOSP IP/OBS HIGH 50: CPT | Performed by: GENERAL PRACTICE

## 2024-03-10 RX ORDER — ECHINACEA PURPUREA EXTRACT 125 MG
1 TABLET ORAL
Status: DISCONTINUED | OUTPATIENT
Start: 2024-03-10 | End: 2024-03-12 | Stop reason: HOSPADM

## 2024-03-10 RX ORDER — LEVETIRACETAM 500 MG/1
500 TABLET ORAL EVERY 12 HOURS SCHEDULED
Status: DISCONTINUED | OUTPATIENT
Start: 2024-03-10 | End: 2024-03-12 | Stop reason: HOSPADM

## 2024-03-10 RX ADMIN — LEVETIRACETAM 500 MG: 500 TABLET, FILM COATED ORAL at 22:48

## 2024-03-10 RX ADMIN — ENOXAPARIN SODIUM 40 MG: 40 INJECTION SUBCUTANEOUS at 08:05

## 2024-03-10 RX ADMIN — LEVETIRACETAM 500 MG: 100 SOLUTION ORAL at 08:05

## 2024-03-10 RX ADMIN — ACETAMINOPHEN 650 MG: 325 TABLET, FILM COATED ORAL at 07:49

## 2024-03-10 RX ADMIN — ACETAMINOPHEN 650 MG: 325 TABLET, FILM COATED ORAL at 17:37

## 2024-03-10 NOTE — ASSESSMENT & PLAN NOTE
Patient has multinodular goiter.  The most recent USG thyroid was unremarkable.    Plan:  Follow-up with thyroid USG every 1 year.  With endocrinology as an outpatient

## 2024-03-10 NOTE — PLAN OF CARE
Problem: Potential for Falls  Goal: Patient will remain free of falls  Description: INTERVENTIONS:  - Educate patient/family on patient safety including physical limitations  - Instruct patient to call for assistance with activity   - Consult OT/PT to assist with strengthening/mobility   - Keep Call bell within reach  - Keep bed low and locked with side rails adjusted as appropriate  - Keep care items and personal belongings within reach  - Initiate and maintain comfort rounds  - Make Fall Risk Sign visible to staff  - Offer Toileting every  Hours, in advance of need  - Initiate/Maintain alarm  - Obtain necessary fall risk management equipment:   - Apply yellow socks and bracelet for high fall risk patients  - Consider moving patient to room near nurses station  3/10/2024 0822 by Yanni Solis, RN  Outcome: Progressing  3/9/2024 1839 by Yanni Solis RN  Outcome: Progressing     Problem: PAIN - ADULT  Goal: Verbalizes/displays adequate comfort level or baseline comfort level  Description: Interventions:  - Encourage patient to monitor pain and request assistance  - Assess pain using appropriate pain scale  - Administer analgesics based on type and severity of pain and evaluate response  - Implement non-pharmacological measures as appropriate and evaluate response  - Consider cultural and social influences on pain and pain management  - Notify physician/advanced practitioner if interventions unsuccessful or patient reports new pain  3/10/2024 0822 by Yanni Solis RN  Outcome: Progressing  3/9/2024 1839 by Yanni Solis, RN  Outcome: Progressing     Problem: SAFETY ADULT  Goal: Patient will remain free of falls  Description: INTERVENTIONS:  - Educate patient/family on patient safety including physical limitations  - Instruct patient to call for assistance with activity   - Consult OT/PT to assist with strengthening/mobility   - Keep Call bell within reach  - Keep bed low and locked with side rails  adjusted as appropriate  - Keep care items and personal belongings within reach  - Initiate and maintain comfort rounds  - Make Fall Risk Sign visible to staff  - Offer Toileting every  Hours, in advance of need  - Initiate/Maintain alarm  - Obtain necessary fall risk management equipment:   - Apply yellow socks and bracelet for high fall risk patients  - Consider moving patient to room near nurses station  3/10/2024 0822 by Yanni Solis RN  Outcome: Progressing  3/9/2024 1839 by Yanni Solis RN  Outcome: Progressing  Goal: Maintain or return to baseline ADL function  Description: INTERVENTIONS:  -  Assess patient's ability to carry out ADLs; assess patient's baseline for ADL function and identify physical deficits which impact ability to perform ADLs (bathing, care of mouth/teeth, toileting, grooming, dressing, etc.)  - Assess/evaluate cause of self-care deficits   - Assess range of motion  - Assess patient's mobility; develop plan if impaired  - Assess patient's need for assistive devices and provide as appropriate  - Encourage maximum independence but intervene and supervise when necessary  - Involve family in performance of ADLs  - Assess for home care needs following discharge   - Consider OT consult to assist with ADL evaluation and planning for discharge  - Provide patient education as appropriate  3/10/2024 0822 by Yanni Solis RN  Outcome: Progressing  3/9/2024 1839 by Yanni Solis RN  Outcome: Progressing  Goal: Maintains/Returns to pre admission functional level  Description: INTERVENTIONS:  - Perform AM-PAC 6 Click Basic Mobility/ Daily Activity assessment daily.  - Set and communicate daily mobility goal to care team and patient/family/caregiver.   - Collaborate with rehabilitation services on mobility goals if consulted  - Perform Range of Motion  times a day.  - Reposition patient every  hours.  - Dangle patient  times a day  - Stand patient  times a day  - Ambulate patient  times a  day  - Out of bed to chair  times a day   - Out of bed for meal times a day  - Out of bed for toileting  - Record patient progress and toleration of activity level   3/10/2024 0822 by Yanni Solis RN  Outcome: Progressing  3/9/2024 1839 by Yanni Solis RN  Outcome: Progressing     Problem: NEUROSENSORY - ADULT  Goal: Achieves stable or improved neurological status  Description: INTERVENTIONS  - Monitor and report changes in neurological status  - Monitor vital signs such as temperature, blood pressure, glucose, and any other labs ordered   - Initiate measures to prevent increased intracranial pressure  - Monitor for seizure activity and implement precautions if appropriate      3/10/2024 0822 by Yanni Solis RN  Outcome: Progressing  3/9/2024 1839 by Yanni Solis RN  Outcome: Progressing  Goal: Remains free of injury related to seizures activity  Description: INTERVENTIONS  - Maintain airway, patient safety  and administer oxygen as ordered  - Monitor patient for seizure activity, document and report duration and description of seizure to physician/advanced practitioner  - If seizure occurs,  ensure patient safety during seizure  - Reorient patient post seizure  - Seizure pads on all 4 side rails  - Instruct patient/family to notify RN of any seizure activity including if an aura is experienced  - Instruct patient/family to call for assistance with activity based on nursing assessment  - Administer anti-seizure medications if ordered    3/10/2024 0822 by Yanni Solis RN  Outcome: Progressing  3/9/2024 1839 by Yanni Solis RN  Outcome: Progressing  Goal: Achieves maximal functionality and self care  Description: INTERVENTIONS  - Monitor swallowing and airway patency with patient fatigue and changes in neurological status  - Encourage and assist patient to increase activity and self care.   - Encourage visually impaired, hearing impaired and aphasic patients to use assistive/communication  devices  3/10/2024 0822 by Yanni Solis RN  Outcome: Progressing  3/9/2024 1839 by Yanni Solis RN  Outcome: Progressing     Problem: CARDIOVASCULAR - ADULT  Goal: Maintains optimal cardiac output and hemodynamic stability  Description: INTERVENTIONS:  - Monitor I/O, vital signs and rhythm  - Monitor for S/S and trends of decreased cardiac output  - Administer and titrate ordered vasoactive medications to optimize hemodynamic stability  - Assess quality of pulses, skin color and temperature  - Assess for signs of decreased coronary artery perfusion  - Instruct patient to report change in severity of symptoms  3/10/2024 0822 by Yanni Solis RN  Outcome: Progressing  3/9/2024 1839 by Yanni Solis RN  Outcome: Progressing  Goal: Absence of cardiac dysrhythmias or at baseline rhythm  Description: INTERVENTIONS:  - Continuous cardiac monitoring, vital signs, obtain 12 lead EKG if ordered  - Administer antiarrhythmic and heart rate control medications as ordered  - Monitor electrolytes and administer replacement therapy as ordered  3/10/2024 0822 by Yanni Solis RN  Outcome: Progressing  3/9/2024 1839 by Yanni Solis RN  Outcome: Progressing

## 2024-03-10 NOTE — PROGRESS NOTES
[facility]  Progress Note  Name: Leighann Dolan I  MRN: [id number]  Unit/Bed#: S -01 I Date of Admission: 3/9/2024   Date of Service: 3/10/2024 I Hospital Day: 1    Assessment/Plan   * Phenytoin toxicity  Assessment & Plan  Patient has been taking 100 mg phenytoin 3 times a day for the past more than 40 years.  Many years ago, patient had ataxia complicated with fall and patient was try to wean off phenytoin but patient had breakthrough seizure and patient was put on phenytoin 100 mg 3 times a day again.  During this admission, phenytoin level level: 50.6  EKG: ST elevation in lead I and QRS complex widening, possibly changes because of phenytoin, antiarrhythmic class Ib  CTA head unremarkable  Stopped Phenytoin   MRI- Small foci of mildly restricted diffusion within the right corona radiata may represent a late acute to early subacute infarct versus artifact, Asymmetric bilateral cerebellar atrophy relative to supratentorial atrophy. Focal FLAIR signal abnormality within the left corona radiata, nonspecific however favored to represent microangiopathic changes in this age group, Near complete opacification of the left sphenoid sinus with T2 hypointense signal, which could represent inspissated debris versus fungal infection.       Plan:  Appreciate neurology recommendations  Continue Keppra 500mg BID   Seizure and fall precautions      Thyroid nodule  Assessment & Plan  Patient has multinodular goiter.  The most recent [facility] thyroid was unremarkable.    Plan:  Follow-up with thyroid USG every 1 year.  With endocrinology as an outpatient    Rheumatoid arthritis of multiple sites with negative rheumatoid factor (HCC)  Assessment & Plan  Patient does not take hydroxychloroquine anymore and takes over-the-counter supplement               VTE Pharmacologic Prophylaxis: VTE Score: 3 Moderate Risk (Score 3-4) - Pharmacological DVT Prophylaxis Ordered: enoxaparin (Lovenox).    Mobility:    Basic Mobility Inpatient Raw Score: 22  JH-HLM Goal: 7: Walk 25 feet or more  JH-HLM Achieved: 6: Walk 10 steps or more  HLM Goal achieved. Continue to encourage appropriate mobility.    Patient Centered Rounds: I performed bedside rounds with nursing staff today.   Discussions with Specialists or Other Care Team Provider: Neurology    Education and Discussions with Family / Patient: Updated  () via phone.    Total Time Spent on Date of Encounter in care of patient: 25 mins. This time was spent on one or more of the following: performing physical exam; counseling and coordination of care; obtaining or reviewing history; documenting in the medical record; reviewing/ordering tests, medications or procedures; communicating with other healthcare professionals and discussing with patient's family/caregivers.    Current Length of Stay: 1 day(s)  Current Patient Status: Inpatient   Certification Statement: The patient will continue to require additional inpatient hospital stay due to phenytoin toxicity  Discharge Plan: Anticipate discharge in 24-48 hrs to discharge location to be determined pending rehab evaluations.    Code Status: Level 1 - Full Code    Subjective:   Patient was seen and examined at the bedside.  Patient was resting comfortably in no overt acute distress.  Patient endorses that she feels significantly better in comparison to when she was admitted.  Patient reports she is able to ambulate without becoming dizzy.  Patient denies any headache, dizziness, lightheadedness, syncope, neck pain, sinus pressure pain, fever, chills, chest pain, shortness of breath, palpitations, abdominal pain, nausea, vomiting, diarrhea, urinary symptoms, leg pain, numbness, tingling, weakness, or any other symptoms at this time.    Objective:     Vitals:   Temp (24hrs), Av °F (36.7 °C), Min:97.7 °F (36.5 °C), Max:98.2 °F (36.8 °C)    Temp:  [97.7 °F (36.5 °C)-98.2 °F (36.8 °C)] 98.2 °F (36.8 °C)  HR:   [61-72] 72  Resp:  [16] 16  BP: (106-152)/() 119/64  SpO2:  [96 %-99 %] 96 %  There is no height or weight on file to calculate BMI.     Input and Output Summary (last 24 hours):     Intake/Output Summary (Last 24 hours) at 3/10/2024 1229  Last data filed at 3/9/2024 2301  Gross per 24 hour   Intake 1000 ml   Output 800 ml   Net 200 ml       Physical Exam:   Physical Exam  Vitals and nursing note reviewed.   Constitutional:       General: She is not in acute distress.     Appearance: She is well-developed. She is not ill-appearing, toxic-appearing or diaphoretic.   HENT:      Head: Normocephalic and atraumatic.      Mouth/Throat:      Mouth: Mucous membranes are moist.   Eyes:      General: No scleral icterus.        Right eye: No discharge.         Left eye: No discharge.      Extraocular Movements: Extraocular movements intact.      Conjunctiva/sclera: Conjunctivae normal.      Pupils: Pupils are equal, round, and reactive to light.   Cardiovascular:      Rate and Rhythm: Normal rate and regular rhythm.      Pulses: Normal pulses.      Heart sounds: Normal heart sounds. No murmur heard.     No friction rub. No gallop.   Pulmonary:      Effort: Pulmonary effort is normal. No respiratory distress.      Breath sounds: Normal breath sounds. No wheezing or rhonchi.   Chest:      Chest wall: No tenderness.   Abdominal:      General: Bowel sounds are normal. There is no distension.      Palpations: Abdomen is soft.      Tenderness: There is no abdominal tenderness. There is no guarding or rebound.   Musculoskeletal:         General: No tenderness. Normal range of motion.      Cervical back: Normal range of motion and neck supple. No rigidity or tenderness.      Right lower leg: No edema.      Left lower leg: No edema.   Skin:     General: Skin is warm and dry.      Capillary Refill: Capillary refill takes less than 2 seconds.   Neurological:      General: No focal deficit present.      Mental Status: She is alert  and oriented to person, place, and time.      Cranial Nerves: No cranial nerve deficit, dysarthria or facial asymmetry.      Sensory: Sensation is intact. No sensory deficit.      Motor: Motor function is intact. No weakness, tremor or atrophy.      Coordination: Coordination is intact. Coordination normal. Finger-Nose-Finger Test and Heel to Shin Test normal.   Psychiatric:         Mood and Affect: Mood normal.         Behavior: Behavior normal.         Thought Content: Thought content normal.          Additional Data:     Labs:  Results from last 7 days   Lab Units 03/10/24  0000   WBC Thousand/uL 6.15   HEMOGLOBIN g/dL 11.0*   HEMATOCRIT % 35.1   PLATELETS Thousands/uL 274   NEUTROS PCT % 51   LYMPHS PCT % 37   MONOS PCT % 10   EOS PCT % 1     Results from last 7 days   Lab Units 03/10/24  0000 03/09/24  1218   SODIUM mmol/L 140 138   POTASSIUM mmol/L 4.0 4.3   CHLORIDE mmol/L 109* 105   CO2 mmol/L 25 28   BUN mg/dL 12 16   CREATININE mg/dL 0.68 0.64   ANION GAP mmol/L 6 5   CALCIUM mg/dL 8.5 9.3   ALBUMIN g/dL  --  4.1   TOTAL BILIRUBIN mg/dL  --  0.28   ALK PHOS U/L  --  192*   ALT U/L  --  10   AST U/L  --  13   GLUCOSE RANDOM mg/dL 94 93     Results from last 7 days   Lab Units 03/09/24  1218   INR  0.98     Results from last 7 days   Lab Units 03/09/24  1155   POC GLUCOSE mg/dl 104     Results from last 7 days   Lab Units 03/09/24  1218   HEMOGLOBIN A1C % 5.7*           Lines/Drains:  Invasive Devices       Peripheral Intravenous Line  Duration             Peripheral IV 03/10/24 Dorsal (posterior);Left Forearm <1 day                      Telemetry:  Telemetry Orders (From admission, onward)               24 Hour Telemetry Monitoring  (ED Bridging Orders Panel)  Continuous x 24 Hours (Telem)        Expiring   Question:  Reason for 24 Hour Telemetry  Answer:  Drug overdose known to cause cardiac arrhythmias (e.g. - Cocaine, TCA, Amphetamines, Phenothiazines, Digoxin, etc.) Meds known to need cardiac  monitoring: Amiodarone, Dopamine, Dobutamine, Phenytoin                     Telemetry Reviewed: Normal Sinus Rhythm  Indication for Continued Telemetry Use: Arrthymias requiring medical therapy             Imaging: Reviewed radiology reports from this admission including: chest xray, CT head, and MRI brain    Recent Cultures (last 7 days):         Last 24 Hours Medication List:   Current Facility-Administered Medications   Medication Dose Route Frequency Provider Last Rate    acetaminophen  650 mg Oral Q6H PRN Ling Walton MD      enoxaparin  40 mg Subcutaneous Q24H DAVID Walton MD      levETIRAcetam  500 mg Oral Q12H [facility] Apolinar Kohli DO      sodium chloride  1 spray Each Nare Q1H PRN Apolinar Kohli DO          Today, Patient Was Seen By: Kaitlynn Jackman MD    **Please Note: This note may have been constructed using a voice recognition system.**

## 2024-03-10 NOTE — ASSESSMENT & PLAN NOTE
Patient has been taking 100 mg phenytoin 3 times a day for the past more than 40 years.  Many years ago, patient had ataxia complicated with fall and patient was try to wean off phenytoin but patient had breakthrough seizure and patient was put on phenytoin 100 mg 3 times a day again.  During this admission, phenytoin level level: 50.6  EKG: ST elevation in lead I and QRS complex widening, possibly changes because of phenytoin, antiarrhythmic class Ib  CTA head unremarkable  Stopped Phenytoin   MRI- Small foci of mildly restricted diffusion within the right corona radiata may represent a late acute to early subacute infarct versus artifact, Asymmetric bilateral cerebellar atrophy relative to supratentorial atrophy. Focal FLAIR signal abnormality within the left corona radiata, nonspecific however favored to represent microangiopathic changes in this age group, Near complete opacification of the left sphenoid sinus with T2 hypointense signal, which could represent inspissated debris versus fungal infection.       Plan:  Appreciate neurology recommendations  Continue Keppra 500mg BID   Seizure and fall precautions

## 2024-03-10 NOTE — PROGRESS NOTES
Progress Note - Neurology   Leighann Dolan 63 y.o. female MRN: [id number]  Unit/Bed#: S -01 Encounter: [id number]      Assessment/Plan     * Phenytoin toxicity  Assessment & Plan  63-year-old female with epilepsy on phenytoin, hyperparathyroidism, seronegative RA not on medications, arthritis, presented with multiple falls and ataxic gait.    She fell due to her imbalance. She has falls 1-2 falls/month although she also has arthritis in R ankle. She said it has been ongoing for the past 2 weeks. She stated she had similar issues when her phenytoin level was too high 40 years ago. There was prior attempt to wean her off phenytoin in the past but had seizures as a result but was not put on a replacement AED.   Compliant w/ meds and no recent changes w/ medications. Has been on phenytoin 100mg TID for the past 40 years w/o changes.       Exam:   Multidirectional nystagmus and mild ataxia in LUE      W/U:   -Phenytoin level: 50.   -CMP WNL except for elevated Alk Phos  -MRI brain demonstrated tiny infarct in the right corona radiata vs. artifact. Cerebellar atrophy noted.  -CTA H/N: No mass effect, acute intracranial hemorrhage or evidence of recent infarction. no evidence for high-grade stenosis, focal occlusion or vascular aneurysm of the cervical or intracranial vessels.    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     Ataxia/falls likely due to phenytoin toxicity.   Favor artifact noted on MRI but cannot entirely rule out tiny incidental stroke. Patient agreeable to start ASA and f/u with Neurovascular as outpatient.  Cerebellar atrophy likely related to longstanding phenytoin use.    Plan:   - Continue off phenytoin.  - Check phenytoin levels daily until normalized.  - Started on Keppra 500mg BID on 03/09  - Start ASA 81mg daily  - fall precautions   - seizure precautions   - CTH for any acute changes in neuro exam   - rest of care as per primary           Leighann Dolan will need  follow up in in 6 weeks with neurovascular attending. She will not require outpatient neurological testing.    Subjective:   Patient says she feels minimally better today with her walking.  Notes she had some balance difficulty prior to several weeks ago.    ROS: 12 point review of systems performed and negative except as indicated above.    Vitals: Blood pressure 104/63, pulse 72, temperature 97.7 °F (36.5 °C), resp. rate 16, SpO2 97%, not currently breastfeeding.,There is no height or weight on file to calculate BMI.    Physical Exam: AP acting as scribe for attending exam  General:  Patient is well-developed, well-nourished, and in no acute distress.  HEENT:  Head normocephalic.  Eyes anicteric.    Cardiovascular:  With regular rhythm.  Lungs:  Normal effort. Nonlabored breathing.  Extremities:  With no significant edema.    Skin: No rashes.  Neurologic:  Patient is alert, pleasantly interactive, and appropriately conversational.  No obvious symbolic language difficulty or dysarthria.    Gait deferred for safety.    Cranial Nerves:   II: Visual fields full to confrontation. Pupils equal, round, reactive to light with normal accomodation.  Cannot visualize optic fundi.  III,IV,VI: extraocular movements intact with multidirectional nystagmus.  V: Sensation in the V1 through V3 distributions intact to light touch bilaterally.   VII: Face is symmetric with no weakness noted.   IX/X: Uvula midline. Soft palate elevation symmetric.   XII: Tongue midline with no atrophy or fasciculations with appropriate movement.     Coordination: Some dysmetria with L FNF and HTS but accurate on right.    Motor testing with no upper or lower extremity drift.    Lab, Imaging and other studies: CBC:   Results from last 7 days   Lab Units 03/10/24  0000 03/09/24  1218   WBC Thousand/uL 6.15 5.68   RBC Million/uL 3.93 4.46   HEMOGLOBIN g/dL 11.0* 12.5   HEMATOCRIT % 35.1 39.5   MCV fL 89 89   PLATELETS Thousands/uL 274 323   , BMP/CMP:    Results from last 7 days   Lab Units 03/10/24  0000 03/09/24  1218   SODIUM mmol/L 140 138   POTASSIUM mmol/L 4.0 4.3   CHLORIDE mmol/L 109* 105   CO2 mmol/L 25 28   BUN mg/dL 12 16   CREATININE mg/dL 0.68 0.64   CALCIUM mg/dL 8.5 9.3   AST U/L  --  13   ALT U/L  --  10   ALK PHOS U/L  --  192*   EGFR ml/min/1.73sq m 94 95   , Medication Drug Levels:   Results from last 7 days   Lab Units 03/09/24  1218   PHENYTOIN LVL ug/mL 50.6*     VTE Prophylaxis: Sequential compression device (Venodyne)     Counseling / Coordination of Care  I have spent a total time of 45 minutes on 03/10/24 in caring for this patient including Diagnostic results, Prognosis, Instructions for management, Patient and family education, Importance of tx compliance, Risk factor reductions, Impressions, Counseling / Coordination of care, Documenting in the medical record, Reviewing / ordering tests, medicine, procedures  , Obtaining or reviewing history  , and Communicating with other healthcare professionals .

## 2024-03-10 NOTE — QUICK NOTE
Attempted to update Spouse Mr. Dolan. Unfortunately was unable to reach at this time, left a voicemail. Will try again later.

## 2024-03-11 ENCOUNTER — APPOINTMENT (INPATIENT)
Dept: NON INVASIVE DIAGNOSTICS | Facility: HOSPITAL | Age: 64
DRG: 093 | End: 2024-03-11
Payer: COMMERCIAL

## 2024-03-11 LAB
ANION GAP SERPL CALCULATED.3IONS-SCNC: 7 MMOL/L
AORTIC ROOT: 2.8 CM
APICAL FOUR CHAMBER EJECTION FRACTION: 62 %
ATRIAL RATE: 68 BPM
BSA FOR ECHO PROCEDURE: 1.44 M2
BUN SERPL-MCNC: 17 MG/DL (ref 5–25)
CALCIUM SERPL-MCNC: 8.6 MG/DL (ref 8.4–10.2)
CHLORIDE SERPL-SCNC: 108 MMOL/L (ref 96–108)
CHOLEST SERPL-MCNC: 212 MG/DL
CO2 SERPL-SCNC: 25 MMOL/L (ref 21–32)
CREAT SERPL-MCNC: 0.61 MG/DL (ref 0.6–1.3)
E WAVE DECELERATION TIME: 244 MS
E/A RATIO: 1.09
ERYTHROCYTE [DISTWIDTH] IN BLOOD BY AUTOMATED COUNT: 13.8 % (ref 11.6–15.1)
FRACTIONAL SHORTENING: 35 (ref 28–44)
GFR SERPL CREATININE-BSD FRML MDRD: 97 ML/MIN/1.73SQ M
GLUCOSE SERPL-MCNC: 99 MG/DL (ref 65–140)
HCT VFR BLD AUTO: 36.1 % (ref 34.8–46.1)
HDLC SERPL-MCNC: 76 MG/DL
HGB BLD-MCNC: 11.4 G/DL (ref 11.5–15.4)
INTERVENTRICULAR SEPTUM IN DIASTOLE (PARASTERNAL SHORT AXIS VIEW): 0.5 CM
INTERVENTRICULAR SEPTUM: 0.5 CM (ref 0.6–1.1)
LAAS-AP2: 11.6 CM2
LAAS-AP4: 11.1 CM2
LDLC SERPL CALC-MCNC: 125 MG/DL (ref 0–100)
LEFT ATRIUM SIZE: 2.7 CM
LEFT ATRIUM VOLUME (MOD BIPLANE): 24 ML
LEFT ATRIUM VOLUME INDEX (MOD BIPLANE): 16.6 ML/M2
LEFT INTERNAL DIMENSION IN SYSTOLE: 2.8 CM (ref 2.1–4)
LEFT VENTRICULAR INTERNAL DIMENSION IN DIASTOLE: 4.3 CM (ref 3.5–6)
LEFT VENTRICULAR POSTERIOR WALL IN END DIASTOLE: 0.5 CM
LEFT VENTRICULAR STROKE VOLUME: 55 ML
LVSV (TEICH): 55 ML
MAGNESIUM SERPL-MCNC: 2 MG/DL (ref 1.9–2.7)
MCH RBC QN AUTO: 28 PG (ref 26.8–34.3)
MCHC RBC AUTO-ENTMCNC: 31.6 G/DL (ref 31.4–37.4)
MCV RBC AUTO: 89 FL (ref 82–98)
MV E'TISSUE VEL-SEP: 7 CM/S
MV PEAK A VEL: 0.53 M/S
MV PEAK E VEL: 58 CM/S
MV STENOSIS PRESSURE HALF TIME: 71 MS
MV VALVE AREA P 1/2 METHOD: 3.1
P AXIS: 67 DEGREES
PHENYTOIN SERPL-MCNC: 33.8 UG/ML (ref 10–20)
PLATELET # BLD AUTO: 297 THOUSANDS/UL (ref 149–390)
PMV BLD AUTO: 9.3 FL (ref 8.9–12.7)
POTASSIUM SERPL-SCNC: 3.9 MMOL/L (ref 3.5–5.3)
PR INTERVAL: 142 MS
QRS AXIS: 11 DEGREES
QRSD INTERVAL: 100 MS
QT INTERVAL: 414 MS
QTC INTERVAL: 416 MS
RBC # BLD AUTO: 4.07 MILLION/UL (ref 3.81–5.12)
RIGHT ATRIUM AREA SYSTOLE A4C: 10.7 CM2
RIGHT VENTRICLE ID DIMENSION: 3.2 CM
SL CV LEFT ATRIUM LENGTH A2C: 4 CM
SL CV LV EF: 60
SL CV PED ECHO LEFT VENTRICLE DIASTOLIC VOLUME (MOD BIPLANE) 2D: 83 ML
SL CV PED ECHO LEFT VENTRICLE SYSTOLIC VOLUME (MOD BIPLANE) 2D: 28 ML
SODIUM SERPL-SCNC: 140 MMOL/L (ref 135–147)
T WAVE AXIS: 204 DEGREES
TRICUSPID ANNULAR PLANE SYSTOLIC EXCURSION: 2 CM
TRIGL SERPL-MCNC: 53 MG/DL
VENTRICULAR RATE: 61 BPM
WBC # BLD AUTO: 5.32 THOUSAND/UL (ref 4.31–10.16)

## 2024-03-11 PROCEDURE — 93306 TTE W/DOPPLER COMPLETE: CPT | Performed by: INTERNAL MEDICINE

## 2024-03-11 PROCEDURE — 93010 ELECTROCARDIOGRAM REPORT: CPT | Performed by: INTERNAL MEDICINE

## 2024-03-11 PROCEDURE — 97163 PT EVAL HIGH COMPLEX 45 MIN: CPT

## 2024-03-11 PROCEDURE — 80048 BASIC METABOLIC PNL TOTAL CA: CPT

## 2024-03-11 PROCEDURE — 83735 ASSAY OF MAGNESIUM: CPT

## 2024-03-11 PROCEDURE — 99233 SBSQ HOSP IP/OBS HIGH 50: CPT | Performed by: GENERAL PRACTICE

## 2024-03-11 PROCEDURE — 80061 LIPID PANEL: CPT

## 2024-03-11 PROCEDURE — 93306 TTE W/DOPPLER COMPLETE: CPT

## 2024-03-11 PROCEDURE — 97116 GAIT TRAINING THERAPY: CPT

## 2024-03-11 PROCEDURE — 85027 COMPLETE CBC AUTOMATED: CPT

## 2024-03-11 PROCEDURE — 80185 ASSAY OF PHENYTOIN TOTAL: CPT | Performed by: NURSE PRACTITIONER

## 2024-03-11 RX ORDER — LEVETIRACETAM 500 MG/1
500 TABLET ORAL EVERY 12 HOURS SCHEDULED
Qty: 60 TABLET | Refills: 0 | Status: CANCELLED | OUTPATIENT
Start: 2024-03-11 | End: 2024-04-10

## 2024-03-11 RX ORDER — ATORVASTATIN CALCIUM 40 MG/1
40 TABLET, FILM COATED ORAL
Status: DISCONTINUED | OUTPATIENT
Start: 2024-03-11 | End: 2024-03-12 | Stop reason: HOSPADM

## 2024-03-11 RX ORDER — ATORVASTATIN CALCIUM 40 MG/1
40 TABLET, FILM COATED ORAL
Qty: 30 TABLET | Refills: 0 | Status: CANCELLED | OUTPATIENT
Start: 2024-03-11 | End: 2024-04-10

## 2024-03-11 RX ADMIN — ATORVASTATIN CALCIUM 40 MG: 40 TABLET, FILM COATED ORAL at 22:18

## 2024-03-11 RX ADMIN — ENOXAPARIN SODIUM 40 MG: 40 INJECTION SUBCUTANEOUS at 08:35

## 2024-03-11 RX ADMIN — ASPIRIN 81 MG: 81 TABLET ORAL at 08:35

## 2024-03-11 RX ADMIN — LEVETIRACETAM 500 MG: 500 TABLET, FILM COATED ORAL at 22:18

## 2024-03-11 RX ADMIN — LEVETIRACETAM 500 MG: 500 TABLET, FILM COATED ORAL at 08:35

## 2024-03-11 NOTE — UTILIZATION REVIEW
Initial Clinical Review    Admission: Date/Time/Statement:   Admission Orders (From admission, onward)       Ordered        03/09/24 1456  INPATIENT ADMISSION  Once                          Orders Placed This Encounter   Procedures    INPATIENT ADMISSION     Standing Status:   Standing     Number of Occurrences:   1     Order Specific Question:   Level of Care     Answer:   Med Surg [16]     Order Specific Question:   Estimated length of stay     Answer:   More than 2 Midnights     Order Specific Question:   Certification     Answer:   I certify that inpatient services are medically necessary for this patient for a duration of greater than two midnights. See H&P and MD Progress Notes for additional information about the patient's course of treatment.     ED Arrival Information       Expected   -    Arrival   3/9/2024 11:39    Acuity   Urgent              Means of arrival   Walk-In    Escorted by   Spouse    Service   Hospitalist    Admission type   Emergency              Arrival complaint   dizzy spells and fall this morning hit head             Chief Complaint   Patient presents with    Fall    Dizziness     Fell today because she felt dizzy. Did not pass out, + head strike, no thinners. L pupil also looks smaller than R pupil.        Initial Presentation: 63 y.o. female with hx seizure disorder on phenytoin,  COPD, nodular thyroid, arthritis who presents to ED from home with ataxia, nystagmus, anisocoria and subsequent fall , witnessed, + head strike. Did not have strokelike symptoms, seizure-like activities, syncope .   Approximately 40 years ago, patient had a similar episode and patient was try to wean of from the phenytoin because of suspected phenytoin toxicity but patient had episode of seizure so phenytoin 100 mg 3 times daily was restarted and since then patient is on that. On exam, Left-sided horizontal unidirectional nystagmus. Anisocoria (right greater than left)  . Labs - Phenytoin level elevated 50.6  CTA H/N unremarkable .  ECG- NSR with ST segment changes in lead V1 and QRS widening compared to 2012 EKG. This is possibly because of phenytoin, which is class I antiarrhythmic. Pt given IVF in ED.  Admitted as Inpatient to telemetry with phenytoin toxicity. Plan - Neurology and med toxicity consults. Telemetry . Keppra 500 mg BID> Stop Phenytoin. MRI brain . Seizure monitoring, fall monitoring.     Med toxicology- consult- . Her serum phenytoin concentration is 50.6 ng/mL and her renal function and albumin are normal. She reports phenytoin dosing of extended release formulary 3 times daily and has becoming increasingly ataxic during the last 2 weeks with a recent fall. EKG is abnormal and is not necessarily related to the phenytoin toxicity so please also evaluate for any cardiac issues.    Plan- supportive care, including cardiac and respiratory monitoring, fall precautions, IV fluid hydration. Hold phenytoin .     Neurology consult- does not have Phenytoin levels checked in the outpatient setting. Has not seen a neurologist at all since she was young.Mainly noted to have nystagmus and ataxia on exam. Suspect symptoms related to phenytoin toxicity . Trial Keppra , potential for breakthrough seizure while adjusting her medication regimen, and it will be important to maintain seizure precautions. Hold off on driving in the near future .  Stroke on differential, so will check MRI brain for further evaluation. Will determine if further workup needed based on clinical course and MRI brain findings. Continue to monitor phenytoin level.     Date: 3/10   Day 2:     MRI shows small foci of mildly restricted diffusion within the right corona radiata may represent a late acute to early subacute infarct versus artifact . Pt continues on Keppra with Phenytoin stopped yesterday . feels significantly better in comparison to when she was admitted. Patient reports she is able to ambulate without becoming dizzy. Check  orthostatics. Give saline nasal spray prn chronic sinusitis   Neurology- still off-balance when walking. Examination stable compared to yesterday although ataxia in LUE improved. Continues to have nystagmus that is similar to yesterday. Reviewed MRI brain with her. She appears to have cerebellar atrophy which is more likely related to chronic phenytoin use. Unclear if she truly has a punctate infarct in the right corona radiata or if it is just artifact, but would consider incidental and not in any way contributing to presentation .Start aspirin 81 mg daily and complete a stroke workup. Can check TTE . No events concerning for seizure. Will continue to monitor for symptomatic improvement. Phenytoin level remains elevated but down to 39.8 today .  BP goal normotension           ED Triage Vitals   Temperature Pulse Respirations Blood Pressure SpO2   03/09/24 1150 03/09/24 1150 03/09/24 1150 03/09/24 1150 03/09/24 1150   97.7 °F (36.5 °C) 71 16 146/65 95 %      Temp Source Heart Rate Source Patient Position - Orthostatic VS BP Location FiO2 (%)   03/09/24 1150 03/09/24 1150 03/09/24 1150 03/09/24 1150 --   Oral Monitor Sitting Right arm       Pain Score       03/09/24 1705       6          Wt Readings from Last 1 Encounters:   11/15/23 48.4 kg (106 lb 12.8 oz)     Additional Vital Signs:   ate/Time Temp Pulse Resp BP MAP (mmHg) SpO2 Patient Position - Orthostatic VS   03/10/24 22:20:59 97.6 °F (36.4 °C) 67 16 99/55 70 94 % --   03/10/24 17:16:12 -- 72 -- 121/71 88 96 % Standing - Orthostatic VS   03/10/24 17:14:23 -- 70 -- 122/74 90 95 % Sitting - Orthostatic VS   03/10/24 17:10:26 -- 66 -- 106/61 76 97 % Lying - Orthostatic VS   03/10/24 16:26:11 97.7 °F (36.5 °C) 72 -- 104/63 77 97 % --   03/10/24 0900 -- -- -- -- -- -- --   03/10/24 0731 98.2 °F (36.8 °C) 72 16 119/64 82 96 % --   03/09/24 22:36:06 97.7 °F (36.5 °C) 67 -- 106/63 77 96 % --   03/09/24 16:34:22 -- 71 -- 129/107 Abnormal  114 99 % --   03/09/24 1530 --  65 16 152/88 111 97 % Sitting   03/09/24 1500 -- 61 16 145/74 104 98 % Sitting   03/09/24 1430 -- 65 16 134/79 102 98 % Sitting   03/09/24 1332 -- 62 16 135/67 -- 99 % Sitting   03/09/24 1330 -- 62 16 135/67 96 99 % Sitting     Pertinent Labs/Diagnostic Test Results:   3/9 ECG- ECG rate:  61     ECG rate assessment: normal    Rhythm:     Rhythm: sinus rhythm    Ectopy:     Ectopy: none    QRS:     QRS axis:  Normal     QRS intervals:  Wide   Conduction:     Conduction: abnormal      Abnormal conduction: incomplete RBBB    ST segments:     ST segments:  Abnormal     Elevation:  V1 and V2   T waves:     T waves: inverted      Inverted:  II, III, aVF, V1, V2, V3, V4, V5 and V6   Comments:      QT interval was 414/416.  Incomplete at right bundle branch block is   old.  The inverted T waves and ST elevation in V1 V2 is new.  Patient is   asymptomatic and has a normal troponin.  No STEMI alert at this time.  I   spoke to Dr. Charlton as well as Dr. Woodard, we feel that this is secondary to   the Dilantin toxicity.  Will observe for now.         MRI brain wo contrast   Final Result by Erwin Cali MD (03/10 1112)      -Small foci of mildly restricted diffusion within the right corona radiata may represent a late acute to early subacute infarct versus artifact.      -Asymmetric bilateral cerebellar atrophy relative to supratentorial atrophy. Focal FLAIR signal abnormality within the left corona radiata, nonspecific however favored to represent microangiopathic changes in this age group.      -Near complete opacification of the left sphenoid sinus with T2 hypointense signal, which could represent inspissated debris versus fungal infection.         The study was marked in EPIC for immediate notification.      Workstation performed: [id number]         CTA head and neck with and without contrast   ED Interpretation by Julie Lynn Gutzweiler, PA-C (03/09 1431)   CTA head and neck with and without contrast  Status: Final  result    PACS Images     Show images for CTA head and neck with and without contrast  Study Result    Narrative & Impression  CTA NECK AND BRAIN WITH AND WITHOUT CONTRAST     INDICATION: unequal pupils, lightheaded, fall, positive head strike, no LOC     COMPARISON:   CT from 2/24/2009     TECHNIQUE:  Routine CT imaging of the Brain without contrast.  Post contrast imaging was performed after administration of iodinated contrast through the neck and brain. Post contrast axial 0.625 mm images timed to opacify the arterial system.     3D rendering was performed on an independent workstation.   MIP reconstructions performed. Coronal reconstructions were performed of the noncontrast portion of the brain.     Radiation dose length product (DLP) for this visit:  2238 mGy-cm .  This examination, like all CT scans performed in the [facility], was performed utilizing techniques to minimize radiation dose exposure, inc   luding the use of iterative   reconstruction and automated exposure control.     IV Contrast:  85 mL of iohexol (OMNIPAQUE)     IMAGE QUALITY:   Diagnostic     FINDINGS:  NONCONTRAST BRAIN  PARENCHYMA:  No intracranial mass, mass effect or midline shift. No CT signs of acute infarction.  No acute parenchymal hemorrhage.     VENTRICLES AND EXTRA-AXIAL SPACES:  Normal for the patient's age.     VISUALIZED ORBITS: Normal visualized orbits.     PARANASAL SINUSES: Subtotal opacification of the left sphenoid sinus. There is chronic osteitis.     CERVICAL VASCULATURE  AORTIC ARCH AND GREAT VESSELS:  Normal aortic arch and great vessel origins. Normal visualized subclavian vessels.     RIGHT VERTEBRAL ARTERY CERVICAL SEGMENT:  Normal origin. The vessel is normal in caliber throughout the neck.     LEFT VERTEBRAL ARTERY CERVICAL SEGMENT:  Normal origin. The vessel is normal in caliber throughout the neck.     RIGHT EXTRACRANIAL CAROTID SEGMENT:  Normal caliber common carotid artery.  Normal bi    furcation and cervical internal carotid artery.  No stenosis or dissection.     LEFT EXTRACRANIAL CAROTID SEGMENT:  Normal caliber common carotid artery.  Normal bifurcation and cervical internal carotid artery.  No stenosis or dissection.     NASCET criteria was used to determine the degree of internal carotid artery diameter stenosis.        INTRACRANIAL VASCULATURE  INTERNAL CAROTID ARTERIES:  Normal enhancement of the intracranial portions of the internal carotid arteries.  Normal ophthalmic artery origins.  Normal ICA terminus. Triangular-shaped infundibulum along the supraclinoid segment of the right ICA.     ANTERIOR CIRCULATION:  Symmetric A1 segments and anterior cerebral arteries with normal enhancement.  Normal anterior communicating artery.     MIDDLE CEREBRAL ARTERY CIRCULATION:  M1 segment and middle cerebral artery branches demonstrate normal enhancement bilaterally.     DISTAL VERTEBRAL ARTERIES:  Normal distal vertebral arteries.  Posterior inferior cerebellar artery    origins are normal. Normal vertebral basilar junction.     BASILAR ARTERY:  Basilar artery is normal in caliber.  Normal superior cerebellar arteries.     POSTERIOR CEREBRAL ARTERIES: Both posterior cerebral arteries arises from the basilar tip.  Both arteries demonstrate normal enhancement.   Normal posterior communicating arteries.     VENOUS STRUCTURES:  Normal.        NON VASCULAR ANATOMY  BONY STRUCTURES:  No acute osseous abnormality.     SOFT TISSUES OF THE NECK: There are low-attenuation thyroid nodules bilaterally.     THORACIC INLET:  Normal.        IMPRESSION:     No mass effect, acute intracranial hemorrhage or evidence of recent infarction.     No evidence for high-grade stenosis, focal occlusion or vascular aneurysm of the cervical or intracranial vessels.     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                     Final Result by Dae Dallas MD (03/09 1427)      No mass effect, acute  intracranial hemorrhage or evidence of recent infarction.      No evidence for high-grade stenosis, focal occlusion or vascular aneurysm of the cervical or intracranial vessels.      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            Workstation performed: [id number]         XR chest 1 view portable   ED Interpretation by Julie Lynn Gutzweiler, PA-C (03/09 1253)   Flattening of the hemidiaphragms concerning for COPD.  No acute cardiopulmonary disease.      Final Result by Reinaldo Bermudez MD (03/09 1257)      No acute cardiopulmonary disease.      Pulmonary hyperinflation with flattened contour of diaphragm which may indicate COPD.      Workstation performed: [id number]               Results from last 7 days   Lab Units 03/11/24  0440 03/10/24  0000 03/09/24  1218   WBC Thousand/uL 5.32 6.15 5.68   HEMOGLOBIN g/dL 11.4* 11.0* 12.5   HEMATOCRIT % 36.1 35.1 39.5   PLATELETS Thousands/uL 297 274 323   NEUTROS ABS Thousands/µL  --  3.10 3.45         Results from last 7 days   Lab Units 03/10/24  0000 03/09/24  1218   SODIUM mmol/L 140 138   POTASSIUM mmol/L 4.0 4.3   CHLORIDE mmol/L 109* 105   CO2 mmol/L 25 28   ANION GAP mmol/L 6 5   BUN mg/dL 12 16   CREATININE mg/dL 0.68 0.64   EGFR ml/min/1.73sq m 94 95   CALCIUM mg/dL 8.5 9.3   MAGNESIUM mg/dL  --   --      Results from last 7 days   Lab Units 03/09/24  1218   AST U/L 13   ALT U/L 10   ALK PHOS U/L 192*   TOTAL PROTEIN g/dL 7.4   ALBUMIN g/dL 4.1   TOTAL BILIRUBIN mg/dL 0.28     Results from last 7 days   Lab Units 03/09/24  1155   POC GLUCOSE mg/dl 104     Results from last 7 days   Lab Units 03/10/24  0000 03/09/24  1218   GLUCOSE RANDOM mg/dL 94 93         Results from last 7 days   Lab Units 03/09/24  1218   HEMOGLOBIN A1C % 5.7*   EAG mg/dl 117             Results from last 7 days   Lab Units 03/09/24  1744 03/09/24  1559 03/09/24  1345   HS TNI 0HR ng/L  --   --  3   HS TNI 2HR ng/L  --  4  --    HSTNI D2 ng/L  --  1  --    HS  TNI 4HR ng/L 4  --   --    HSTNI D4 ng/L 1  --   --          Results from last 7 days   Lab Units 03/09/24  1218   PROTIME seconds 13.6   INR  0.98   PTT seconds 32                   Results from last 7 days   Lab Units 03/09/24  1252   CLARITY UA  Clear   COLOR UA  Light Yellow   SPEC GRAV UA  1.009   PH UA  5.5   GLUCOSE UA mg/dl Negative   KETONES UA mg/dl Negative   BLOOD UA  Trace*   PROTEIN UA mg/dl Negative   NITRITE UA  Negative   BILIRUBIN UA  Negative   UROBILINOGEN UA (BE) mg/dl <2.0   LEUKOCYTES UA  Negative   WBC UA /hpf None Seen   RBC UA /hpf 1-2   BACTERIA UA /hpf None Seen   EPITHELIAL CELLS WET PREP /hpf Occasional      Latest Reference Range & Units 03/09/24 12:18 03/10/24 00:00   PHENYTOIN LEVEL (DILANTIN) 10.0 - 20.0 ug/mL 50.6 (HH) 39.8 (HH)   (): Data is critically high              ED Treatment:   Medication Administration from 03/09/2024 1139 to 03/09/2024 1631         Date/Time Order Dose Route Action     03/09/2024 1546 EST sodium chloride 0.9 % bolus 1,000 mL 0 mL Intravenous Stopped     03/09/2024 1254 EST sodium chloride 0.9 % bolus 1,000 mL 1,000 mL Intravenous New Bag     03/09/2024 1418 EST iohexol (OMNIPAQUE) 350 MG/ML injection (MULTI-DOSE) 85 mL 85 mL Intravenous Given          Past Medical History:   Diagnosis Date    Arthritis     Hyperparathyroidism (HCC)     Osteoporosis     Pollen allergy     Seizure disorder (HCC)      Present on Admission:   Osteoporosis   Rheumatoid arthritis of multiple sites with negative rheumatoid factor ([facility])      Admitting Diagnosis: Ataxia [R27.0]  Nystagmus [H55.00]  Hematoma [T14.8XXA]  Dilantin toxicity [T42.0X1A]  Dizzy spells [R42]  History of recent fall [Z91.81]  Age/Sex: 63 y.o. female  Admission Orders:  Scheduled Medications:  aspirin, 81 mg, Oral, Daily start 3/11  enoxaparin, 40 mg, Subcutaneous, Q24H DAVID  levETIRAcetam, 500 mg, Oral, Q12H DAVID      Continuous IV Infusions:   sodium chloride 0.9 % infusion  Rate: 100 mL/hr Dose: 100  mL/hr  Freq: Continuous Route: IV  Indications of Use: IV Hydration  Last Dose: Stopped (03/10/24 0428)  Start: 03/09/24 1500 End: 03/10/24 0059   PRN Meds:  acetaminophen, 650 mg, Oral, Q6H PRN x2 3/10   sodium chloride, 1 spray, Each Nare, Q1H PRN    Telemetry   Reg diet   OOB as hansa   SCD   Seizure monitoring    IP CONSULT TO TOXICOLOGY  IP CONSULT TO NEUROLOGY    Network Utilization Review Department  ATTENTION: Please call with any questions or concerns to [phone] and carefully listen to the prompts so that you are directed to the right person. All voicemails are confidential.   For Discharge needs, contact Care Management DC Support Team at [phone] opt. 2  Send all requests for admission clinical reviews, approved or denied determinations and any other requests to dedicated fax number below belonging to the [place] where the patient is receiving treatment. List of dedicated fax numbers for the Facilities:  FACILITY NAME UR FAX NUMBER   ADMISSION DENIALS (Administrative/Medical Necessity) [phone]   DISCHARGE SUPPORT TEAM (NETWORK) [phone]   PARENT CHILD HEALTH (Maternity/NICU/Pediatrics) [phone]   [facility] [phone]   [facility] [phone]   [facility] [phone]   [facility] [phone]   [facility] [phone]   [facility] [phone]   [facility] [phone]   [facility] [phone]   [facility] [phone]   [facility] [phone]   [facility] [phone]   [facility] [phone]

## 2024-03-11 NOTE — CASE MANAGEMENT
Case Management Assessment & Discharge Planning Note    Patient name Leighann Dolan  Location S /S -01 MRN [id number]  : 1960 Date 3/11/2024       Current Admission Date: 3/9/2024  Current Admission Diagnosis:Phenytoin toxicity   Patient Active Problem List    Diagnosis Date Noted    Phenytoin toxicity 2024    Thyroid nodule 2024    COPD (chronic obstructive pulmonary disease) (HCC) 2024    Fracture, stress, metatarsal 2016    Anemia, deficiency 2016    Osteoporosis 2015    Vitamin D deficiency 2015    Hyperparathyroidism (HCC) 2015    Rheumatoid arthritis of multiple sites with negative rheumatoid factor (HCC) 2015    Elevated alkaline phosphatase level 2015      LOS (days): 2  Geometric Mean LOS (GMLOS) (days):   Days to GMLOS:     OBJECTIVE:    Risk of Unplanned Readmission Score: 7.32         Current admission status: Inpatient       Preferred Pharmacy:   [facility] [address] [address]  Phone: [phone] Fax: [phone]    [facility]/pharmacy #[id number] - [place], [place] - [address] [facility]  [address] [place] [id number]  Phone: [phone] Fax: [phone]    Primary Care Provider: Bay Bertrand DO    Primary Insurance: BLUE CROSS  Secondary Insurance:     ASSESSMENT:  Active Health Care Proxies       Willian Dolan Health Care Representative - Spouse   Primary Phone: [phone] (Home)  Mobile Phone: [phone]                 Advance Directives  Does patient have a Health Care POA?: No  Was patient offered paperwork?: Yes  Does patient currently have a Health Care decision maker?: No  Does patient have Advance Directives?: No  Was patient offered paperwork?: Yes  Primary Contact: Spouse Willian              Patient Information  Admitted from:: Home  Mental Status: Alert  During Assessment patient was accompanied by: Not accompanied during  assessment  Assessment information provided by:: Patient  Primary Caregiver: Self  Support Systems: Spouse/significant other, Family members  What [place] you [place] in[place]  Home entry access options. Select all that apply.: Stairs  Number of steps to enter home.: 2  Do the steps have railings?: Yes  Type of Current Residence: [facility]  Living Arrangements: Lives w/ Spouse/significant other  Is patient a ?: No    Activities of Daily Living Prior to Admission  Functional Status: Independent  Completes ADLs independently?: Yes  Ambulates independently?: Yes  Does patient use assisted devices?: No  Does patient currently own DME?: Yes  What DME does the patient currently own?: Straight Cane  Does patient have a history of Outpatient Therapy (PT/OT)?: Yes  Does the patient have a history of Short-Term Rehab?: No  Does patient have a history of HHC?: No  Does patient currently have HHC?: No         Patient Information Continued  Income Source: Pension/[employer]  Does patient have prescription coverage?: Yes  Does patient receive dialysis treatments?: No  Does patient have a history of substance abuse?: No  Does patient have a history of Mental Health Diagnosis?: No         Means of Transportation  Means of Transport to Appts:: Drives Self      Social Determinants of Health (SDOH)      Flowsheet Row Most Recent Value   Housing Stability    In the last 12 months, was there a time when you were not able to pay the mortgage or rent on time? N   In the last 12 months, was there a time when you did not have a steady place to sleep or slept in a shelter (including now)? N   Transportation Needs    In the past 12 months, has lack of transportation kept you from medical appointments or from getting medications? no   In the past 12 months, has lack of transportation kept you from meetings, work, or from getting things needed for daily living? No   Food Insecurity    Within the past 12 months, you worried that your  food would run out before you got the money to buy more. Never true   Within the past 12 months, the food you bought just didn't last and you didn't have money to get more. Never true   Utilities    In the past 12 months has the electric, gas, oil, or water company threatened to shut off services in your home? No            DISCHARGE DETAILS:    Discharge planning discussed with:: Patient at bedside  Freedom of Choice: Yes     CM contacted family/caregiver?: No- see comments (declined)  Were Treatment Team discharge recommendations reviewed with patient/caregiver?: Yes  Did patient/caregiver verbalize understanding of patient care needs?: Yes  Were patient/caregiver advised of the risks associated with not following Treatment Team discharge recommendations?: Yes    Contacts  Patient Contacts: Leighann Concepcion  Contact Method: In Person  Reason/Outcome: Discharge Planning    Requested Home Health Care         Is the patient interested in HHC at discharge?: No    DME Referral Provided  Referral made for DME?: No              Treatment Team Recommendation: Home  Discharge Destination Plan:: Home                                         Additional Comments: CM spoke with patient at bedside to introduce role of CM and begin discharge planning.  Patient resides with spouse in a single-level home with 2 CESAR (no railing). Patient reports being IPTA, denies use of DME for ambulation but does own a SPC. Patient denies hx of HHC or STR placement. Patient does not anticipate case management needs at discharge, however we will remain available as needed. CM will continue to monitor.

## 2024-03-11 NOTE — PROGRESS NOTES
[facility]   PROGRESS NOTE- Leighann Dolan, 1960, 63 y.o. female MRN: [id number]   Unit/Bed#: S /S -01 Encounter: [id number]   Primary Care Physician: Bay Bertrand DO   Date and Time Admitted to Hospital: 3/9/2024 11:54 AM     Assessment/Plan:   * Phenytoin toxicity  Assessment & Plan  Patient has been taking 100 mg phenytoin 3 times a day for the past more than 40 years.  Many years ago, patient had ataxia complicated with fall and patient was try to wean off phenytoin but patient had breakthrough seizure and patient was put on phenytoin 100 mg 3 times a day again.  During this admission, phenytoin level level: 50.6>39.8>33.8  EKG: ST elevation in lead I and QRS complex widening, possibly changes because of phenytoin, antiarrhythmic class Ib  CTA head unremarkable  Stopped Phenytoin   MRI- Small foci of mildly restricted diffusion within the right corona radiata may represent a late acute to early subacute infarct versus artifact, Asymmetric bilateral cerebellar atrophy relative to supratentorial atrophy. Focal FLAIR signal abnormality within the left corona radiata, nonspecific however favored to represent microangiopathic changes in this age group, Near complete opacification of the left sphenoid sinus with T2 hypointense signal, which could represent inspissated debris versus fungal infection.    Patient's ataxia may PCP for be still due to her from phenytoin toxicity.       Plan:  Appreciate neurology recommendations - Started patient on ASA for possible CVA prevention   TTE   Continue Keppra 500mg BID   Seizure and fall precautions       COPD (chronic obstructive pulmonary disease) (HCC)  Assessment & Plan  Quit smoking 1 year ago.  CXR showed hyperinflated lungs, possibly emphysema.    Thyroid nodule  Assessment & Plan  Patient has multinodular goiter.  The most recent USG thyroid was unremarkable.    Plan:  Follow-up with thyroid USG every 1 year.  With  endocrinology as an outpatient    Osteoporosis  Assessment & Plan  Patient has osteoporosis history but does not take bisphosphonate anymore  She does not take calcium and vitamin D as well.    Rheumatoid arthritis of multiple sites with negative rheumatoid factor (HCC)  Assessment & Plan  Patient does not take hydroxychloroquine anymore and takes over-the-counter supplement.         VTE Pharmacologic Prophylaxis: VTE Score: 3 Moderate Risk (Score 3-4) - Pharmacological DVT Prophylaxis Ordered: enoxaparin (Lovenox).    Patient Centered Rounds:  Performed bedside rounds with nursing staff.   Discussions with Specialists or Other Care Team Provider: Senior resident and attending  Education and Discussions with Family / Patient: Updated  () via phone.    Current Length of Stay: 2 day(s)  Current Patient Status: Inpatient   Discharge Plan: Anticipate discharge in 24-48 hrs to home.    Code Status: Level 1 - Full Code    Subjective:   No acute events overnight.  Patient notes that she is still having difficulty walking, stating that she feels like she is staggering.  She denies any dizziness when she walks and orthostatics yesterday were negative.  Otherwise the patient denies any focal weakness at this time.  She does not use a cane or walker at baseline.    Objective:     Vitals:   Temp (24hrs), Av.6 °F (36.4 °C), Min:97.6 °F (36.4 °C), Max:97.7 °F (36.5 °C)    Temp:  [97.6 °F (36.4 °C)-97.7 °F (36.5 °C)] 97.6 °F (36.4 °C)  HR:  [64-72] 67  Resp:  [16-17] 17  BP: ()/(55-74) 126/63  SpO2:  [94 %-97 %] 96 %  Body mass index is 20.03 kg/m².     Input and Output Summary (last 24 hours):     Intake/Output Summary (Last 24 hours) at 3/11/2024 1351  Last data filed at 3/11/2024 0801  Gross per 24 hour   Intake --   Output 1200 ml   Net -1200 ml       Physical Exam  Vitals and nursing note reviewed.   Constitutional:       General: She is not in acute distress.     Appearance: Normal appearance.  She is well-developed and normal weight. She is not ill-appearing, toxic-appearing or diaphoretic.   HENT:      Head: Normocephalic and atraumatic.      Right Ear: External ear normal.      Left Ear: External ear normal.      Mouth/Throat:      Mouth: Mucous membranes are moist.   Eyes:      General: No scleral icterus.        Right eye: No discharge.         Left eye: No discharge.      Extraocular Movements: Extraocular movements intact.      Conjunctiva/sclera: Conjunctivae normal.   Cardiovascular:      Rate and Rhythm: Normal rate and regular rhythm.      Pulses: Normal pulses.      Heart sounds: Normal heart sounds. No murmur heard.     No friction rub. No gallop.   Pulmonary:      Effort: Pulmonary effort is normal. No respiratory distress.      Breath sounds: Normal breath sounds. No wheezing or rhonchi.   Chest:      Chest wall: No tenderness.   Abdominal:      General: Abdomen is flat. Bowel sounds are normal. There is no distension.      Palpations: Abdomen is soft.      Tenderness: There is no abdominal tenderness. There is no guarding or rebound.   Musculoskeletal:         General: No tenderness. Normal range of motion.      Cervical back: Normal range of motion and neck supple. No rigidity or tenderness.      Right lower leg: No edema.      Left lower leg: No edema.   Skin:     General: Skin is warm and dry.      Capillary Refill: Capillary refill takes less than 2 seconds.   Neurological:      General: No focal deficit present.      Mental Status: She is alert.      Cranial Nerves: No cranial nerve deficit, dysarthria or facial asymmetry.      Sensory: Sensation is intact. No sensory deficit.      Motor: Motor function is intact. No weakness, tremor or atrophy.      Coordination: Coordination is intact. Romberg sign negative. Finger-Nose-Finger Test and Heel to Shin Test normal.   Psychiatric:         Mood and Affect: Mood normal.         Behavior: Behavior normal.         Thought Content: Thought  content normal.          Additional Data:     Labs:  Results from last 7 days   Lab Units 03/11/24  0440 03/10/24  0000   WBC Thousand/uL 5.32 6.15   HEMOGLOBIN g/dL 11.4* 11.0*   HEMATOCRIT % 36.1 35.1   PLATELETS Thousands/uL 297 274   NEUTROS PCT %  --  51   LYMPHS PCT %  --  37   MONOS PCT %  --  10   EOS PCT %  --  1     Results from last 7 days   Lab Units 03/11/24  0440 03/10/24  0000 03/09/24  1218   SODIUM mmol/L 140   < > 138   POTASSIUM mmol/L 3.9   < > 4.3   CHLORIDE mmol/L 108   < > 105   CO2 mmol/L 25   < > 28   BUN mg/dL 17   < > 16   CREATININE mg/dL 0.61   < > 0.64   ANION GAP mmol/L 7   < > 5   CALCIUM mg/dL 8.6   < > 9.3   ALBUMIN g/dL  --   --  4.1   TOTAL BILIRUBIN mg/dL  --   --  0.28   ALK PHOS U/L  --   --  192*   ALT U/L  --   --  10   AST U/L  --   --  13   GLUCOSE RANDOM mg/dL 99   < > 93    < > = values in this interval not displayed.     Results from last 7 days   Lab Units 03/09/24  1218   INR  0.98     Results from last 7 days   Lab Units 03/09/24  1155   POC GLUCOSE mg/dl 104     Results from last 7 days   Lab Units 03/09/24  1218   HEMOGLOBIN A1C % 5.7*           Lines/Drains:  Invasive Devices       Peripheral Intravenous Line  Duration             Peripheral IV 03/10/24 Dorsal (posterior);Left Forearm 1 day                      Telemetry:  Telemetry Orders (From admission, onward)               24 Hour Telemetry Monitoring  Continuous x 24 Hours (Telem)        Question:  Reason for 24 Hour Telemetry  Answer:  Drug overdose known to cause cardiac arrhythmias (e.g. - Cocaine, TCA, Amphetamines, Phenothiazines, Digoxin, etc.) Meds known to need cardiac monitoring: Amiodarone, Dopamine, Dobutamine, Phenytoin                     Telemetry Reviewed: NSR   Indication for Continued Telemetry Use: Metabolic/electrolyte disturbance with high probability of dysrhythmia           Imaging: Reviewed pertinent radiology reports from this admission.   Recent Cultures (last 7 days):         Last 24  Hours Medication List:   Current Facility-Administered Medications   Medication Dose Route Frequency Provider Last Rate    acetaminophen  650 mg Oral Q6H PRN Ling Walton MD      aspirin  81 mg Oral Daily Kaitlynn Jackman MD      enoxaparin  40 mg Subcutaneous Q24H DAVID Walton MD      levETIRAcetam  500 mg Oral Q12H DAVID Kohli DO      sodium chloride  1 spray Each Nare Q1H PRN Apolinar Kohli DO          Today, Patient Was Seen By: Remigio Villatoro DO    **Please Note: This note may have been constructed using a voice recognition system.**

## 2024-03-11 NOTE — PHYSICAL THERAPY NOTE
" PHYSICAL THERAPY EVALUATION  NAME:  Leighann Dolan  DATE: 03/11/24    AGE:   63 y.o.  Mrn:   [id number]  Principal problem: Principal Problem:    Phenytoin toxicity  Active Problems:    Rheumatoid arthritis of multiple sites with negative rheumatoid factor (HCC)    Osteoporosis    Thyroid nodule    COPD (chronic obstructive pulmonary disease) ([facility])      Vitals:    03/11/24 0647 03/11/24 0700 03/11/24 0930 03/11/24 1317   BP: 112/58 112/58 112/58 126/63   BP Location: Right arm      Pulse: 67 64 64 67   Resp: 17      Temp: 97.6 °F (36.4 °C)      TempSrc: Oral      SpO2: 96% 94%  96%   Weight:   48.1 kg (106 lb)    Height:   5' 1\" (1.549 m)        Length Of Stay: 2  Performed at least 2 patient identifiers during session: Name and Birthday  PHYSICAL THERAPY EVALUATION :    03/11/24 1332   PT Last Visit   PT Visit Date 03/11/24   Note Type   Note type Evaluation   Pain Assessment   Pain Assessment Tool 0-10   Pain Score No Pain  (however pt reports that)   Restrictions/Precautions   Weight Bearing Precautions Per Order No   Other Precautions Fall Risk;Hard of hearing;Seizure   Home Living   Type of Home House   Home Layout One level;Stairs to enter with rails  (modular; 1+2 Rosalino)   Home Equipment   (did not use PTA but has access to cane)   Prior Function   Level of [place] Independent with functional mobility;Independent with ADLs   Lives With Significant other   Falls in the last 6 months 1 to 4  (2 including fall off of toilet, and recent fall in kitchen hitting head on stove)   General   Additional Pertinent History MRI impression : includes but is not limited to: Small foci of mildly restricted diffusion within the right corona radiata may represent a late acute to early subacute infarct versus artifact.   Family/Caregiver Present Yes  (spouse)   Cognition   Overall Cognitive Status WFL   Arousal/Participation Alert   Orientation Level Oriented X4   Following Commands Follows one step commands without " difficulty   RUE Assessment   RUE Assessment WFL   LUE Assessment   LUE Assessment WFL   Strength RLE   R Hip Flexion 4+/5   R Knee Extension 4+/5   R Ankle Dorsiflexion 4+/5   Strength LLE   L Hip Flexion 4+/5   L Knee Extension 4+/5   L Ankle Dorsiflexion 4+/5   Vision-Basic Assessment   Current Vision Wears glasses all the time   Vestibular   Spontaneous Nystagmus (-) no evidence of nystagmus at rest in room light   Vestibular Comments L nystagmus during saccades with L and R vision saccades; reports +increased off balance jaleesa w/ fast head turning   Coordination   Movements are Fluid and Coordinated 0   Finger to Nose & Finger to Finger  Impaired  (L>R UE past pointing)   Light Touch   RLE Light Touch Grossly intact   LLE Light Touch Grossly intact   Bed Mobility   Supine to Sit 6  Modified independent   Additional items HOB elevated;Assist x 1;Increased time required;Verbal cues   Sit to Supine 7  Independent   Additional items Increased time required;HOB elevated   Transfers   Sit to Stand 6  Modified independent   Additional items Increased time required;Armrests   Stand to Sit 6  Modified independent   Additional items Increased time required;Armrests   Ambulation/Elevation   Gait pattern Inconsistent jeremi;Ataxia;Excessively slow;Step through pattern  (self correcting 75% of balance losses including with lateral steppage and scissoring--typically losing balance to R side and reaching for R wall railing)   Gait Assistance   (primairly close S 75% of trial w/ min A 25% of trial)   Assistive Device None   Distance 300'   Stair Management Assistance Not tested   Balance   Static Sitting Good   Static Standing Fair +  (foot width apart; feet close together close S eyes open, NT eyes closed.)   Ambulatory Poor +  (at worst)   Activity Tolerance   Activity Tolerance Patient tolerated treatment well   Medical Staff Made Aware spoke to case management   Nurse Made Aware spoke to Julio PEREZ during and after session  "    Assessment:   Pt is a 63 y.o. female seen for PT evaluation s/p admit to [facility] 3/9/2024 w/ Phenytoin toxicity.  Order placed for PT.      Prior to admission: Pt lived w/spouse in modular home w/ CESAR, had SPC but did not use it.   Upon evaluation: Pt needed no physical A for bed mobility nor transfers, but did need intermittent  A for ambulation in halls w/o device. .      Pt's clinical presentation is currently unstable/unpredictable given the functional mobility deficits above, especially (but not limited to) impaired coordination and gait deviations, and combined with medical complications including fear/retreat and elevated dilantin levels.  Pt IS NOT at her mobility baseline.  She is at risk for falls based on hx of falls, impaired balance, impaired coordination, abnormal 4 point DGI, and subjectively slow gait speed.       During this admission, pt would benefit from continued skilled inpatient PT in the acute care setting in order to address deficits as defined above to maximize function and mobility.      Recommendations:    From a PT standpoint, recommend next several sessions focus on continued mobility training including gait training, trials w/SPC, higher level balance activities, stair training.     Prognosis Fair   Problem List Impaired balance;Decreased mobility;Decreased coordination;Impaired vision  (gait deviationss)   Barriers to Discharge   (CESAR)   Goals   Patient Goals to go home, feel less \"off balance\" and \"less tingly in my head\"   STG Expiration Date 03/21/24   Short Term Goal #1 Goals: Pt will: Perform rolling  and supine<>sit bed mobility tasks with modified I to prepare for transfers and reposition in bed. Perform transfers with modified I to promote proper hand placement and approach. Perform ambulation with +/-LRAD for at least 150' with Supervision to increase Indep in home environment and community, and promote proper use of assistive device. Perform up to 4 stairs " "w/railing +/- DME and w/Supervision to return to home with CESAR. Increased 4 point DGI score to 7/10 w vs w/o cane to demonstrate less risk for falls. .   PT Treatment Day 1   Plan   Treatment/Interventions Functional transfer training;LE strengthening/ROM;Patient/family training;Equipment eval/education;Bed mobility;Gait training;Cognitive reorientation;Endurance training;Therapeutic exercise;Elevations;Spoke to nursing;Spoke to case management   PT Frequency 3-5x/wk   Discharge Recommendation   Rehab Resource Intensity Level, PT III (Minimum Resource Intensity)   Equipment Recommended Cane  (trial @ R hand)   Additional Comments 2 Personal factors affecting pt at time of IE include: steps to enter environment, past experience, behavioral pattern, inability to perform ADLs, inability to ambulate household distances, inability to navigate community distances, and recent fall(s).   AM-PAC Basic Mobility Inpatient   Turning in Flat Bed Without Bedrails 4   Lying on Back to Sitting on Edge of Flat Bed Without Bedrails 4   Moving Bed to Chair 4   Standing Up From Chair Using Arms 4   Walk in Room 3   Climb 3-5 Stairs With Railing 3   Basic Mobility Inpatient Raw Score 22   Basic Mobility Standardized Score 47.4   Highest Level Of Mobility   JH-HLM Goal 7: Walk 25 feet or more   JH-HLM Achieved 8: Walk 250 feet ot more   Dynamic Gait Index   Gait level surface  1   Change in gait speed 1   Gait with horizontal head turns  0   Gait with vertical head turns  (S)  0  (4 point DGI =2/12)   Gait and pivot turn 0   Additional Treatment Session   Start Time 1320   End Time 1332   Treatment Assessment Pt needed less physical A on stairs than during level ambulation but used 1-2 UE support @ railing for performance. Pt reports feeling \"less of balance\" when using SPC but does recognize that it is not a cure-all for her balance deficits. SKilled PT recommended to progress pt toward treatment goals. Recommend conitnued mobility w/ " "cane in room and in halls, but only in halls w/ staff and/or family   Equipment Use SPC   Exercises   Neuro re-ed Transfers modified I, amb w/ SPC on R hand for 70'x2; performed 4 stairs w/B rails with close S, alternating stairs up AND down. However pt does demonstrate less deceleration when using RLE to control descent (attributes to past R ankle injury).   End of Consult   Patient Position at End of Consult All needs within reach;Supine   (Please find full objective findings from PT assessment regarding body systems outlined above).     The patient's -[facility] Basic Mobility Inpatient Short Form Raw Score is 22. A Raw score of greater than 16 suggests the patient may benefit from discharge to home, which   DOES coincide with CURRENT above PT recommendations.     However please refer to therapist recommendation for discharge planning given other factors that may influence destination.     Adapted from Donavan CANTU, Christiano J, Corinne J, Len MEYER. Association of -[facility] “6-Clicks” Basic Mobility and Daily Activity Scores With Discharge Destination. Physical Therapy, 2021;101:1-9. DOI: [email or web address]    Portions of the record may have been created with voice recognition software.  Occasional wrong word or \"sound a like\" substitutions may have occurred due to the inherent limitations of voice recognition software.  Read the chart carefully and recognize, using context, where substitutions have occurred    "

## 2024-03-11 NOTE — PLAN OF CARE
Problem: PHYSICAL THERAPY ADULT  Goal: Performs mobility at highest level of function for planned discharge setting.  See evaluation for individualized goals.  Description: Treatment/Interventions: Functional transfer training, LE strengthening/ROM, Patient/family training, Equipment eval/education, Bed mobility, Gait training, Cognitive reorientation, Endurance training, Therapeutic exercise, Elevations, Spoke to nursing, Spoke to case management  Equipment Recommended: Cane (trial @ R hand)       See flowsheet documentation for full assessment, interventions and recommendations.  3/11/2024 1634 by Juan Sharma PT  Note: Prognosis: Fair  Problem List: Impaired balance, Decreased mobility, Decreased coordination, Impaired vision (gait deviationss)  Assessment: Pt is a 63 y.o. female seen for PT evaluation s/p admit to [facility] on 3/9/2024 w/ Phenytoin toxicity.  Order placed for PT.      Prior to admission: Pt lived w/spouse in modular home w/ CESAR, had SPC but did not use it.   Upon evaluation: Pt needed no physical A for bed mobility nor transfers, but did need intermittent  A for ambulation in halls w/o device. .      Pt's clinical presentation is currently unstable/unpredictable given the functional mobility deficits above, especially (but not limited to) impaired coordination and gait deviations, and combined with medical complications including fear/retreat and elevated dilantin levels .  Pt IS NOT at her mobility baseline.  She is at risk for falls based on hx of falls, impaired balance, impaired coordination, abnormal 4 point DGI, and subjectively slow gait speed.       During this admission, pt would benefit from continued skilled inpatient PT in the acute care setting in order to address deficits as defined above to maximize function and mobility.      Recommendations:     From a PT standpoint, recommend next several sessions focus on continued mobility training including gait training,  trials w/SPC, higher level balance activities, stair training.  Barriers to Discharge:  (CESAR)     Rehab Resource Intensity Level, PT: III (Minimum Resource Intensity)    See flowsheet documentation for full assessment.

## 2024-03-12 VITALS
BODY MASS INDEX: 20.01 KG/M2 | WEIGHT: 106 LBS | SYSTOLIC BLOOD PRESSURE: 129 MMHG | HEIGHT: 61 IN | RESPIRATION RATE: 19 BRPM | OXYGEN SATURATION: 96 % | TEMPERATURE: 97.8 F | HEART RATE: 71 BPM | DIASTOLIC BLOOD PRESSURE: 61 MMHG

## 2024-03-12 PROBLEM — E78.5 HYPERLIPIDEMIA: Status: ACTIVE | Noted: 2024-03-12

## 2024-03-12 PROBLEM — J32.9 SINUSITIS: Status: ACTIVE | Noted: 2024-03-12

## 2024-03-12 LAB
ANION GAP SERPL CALCULATED.3IONS-SCNC: 4 MMOL/L
BUN SERPL-MCNC: 16 MG/DL (ref 5–25)
CALCIUM SERPL-MCNC: 8.9 MG/DL (ref 8.4–10.2)
CHLORIDE SERPL-SCNC: 107 MMOL/L (ref 96–108)
CO2 SERPL-SCNC: 28 MMOL/L (ref 21–32)
CREAT SERPL-MCNC: 0.61 MG/DL (ref 0.6–1.3)
ERYTHROCYTE [DISTWIDTH] IN BLOOD BY AUTOMATED COUNT: 13.7 % (ref 11.6–15.1)
GFR SERPL CREATININE-BSD FRML MDRD: 97 ML/MIN/1.73SQ M
GLUCOSE SERPL-MCNC: 95 MG/DL (ref 65–140)
HCT VFR BLD AUTO: 37.1 % (ref 34.8–46.1)
HGB BLD-MCNC: 11.5 G/DL (ref 11.5–15.4)
MCH RBC QN AUTO: 27.4 PG (ref 26.8–34.3)
MCHC RBC AUTO-ENTMCNC: 31 G/DL (ref 31.4–37.4)
MCV RBC AUTO: 89 FL (ref 82–98)
PHENYTOIN SERPL-MCNC: 31.6 UG/ML (ref 10–20)
PLATELET # BLD AUTO: 281 THOUSANDS/UL (ref 149–390)
PMV BLD AUTO: 8.8 FL (ref 8.9–12.7)
POTASSIUM SERPL-SCNC: 4.1 MMOL/L (ref 3.5–5.3)
RBC # BLD AUTO: 4.19 MILLION/UL (ref 3.81–5.12)
SODIUM SERPL-SCNC: 139 MMOL/L (ref 135–147)
WBC # BLD AUTO: 5.31 THOUSAND/UL (ref 4.31–10.16)

## 2024-03-12 PROCEDURE — 80048 BASIC METABOLIC PNL TOTAL CA: CPT

## 2024-03-12 PROCEDURE — 85027 COMPLETE CBC AUTOMATED: CPT

## 2024-03-12 PROCEDURE — 80185 ASSAY OF PHENYTOIN TOTAL: CPT

## 2024-03-12 PROCEDURE — 97166 OT EVAL MOD COMPLEX 45 MIN: CPT

## 2024-03-12 PROCEDURE — 99239 HOSP IP/OBS DSCHRG MGMT >30: CPT | Performed by: INTERNAL MEDICINE

## 2024-03-12 RX ORDER — ATORVASTATIN CALCIUM 40 MG/1
40 TABLET, FILM COATED ORAL
Qty: 30 TABLET | Refills: 0 | Status: SHIPPED | OUTPATIENT
Start: 2024-03-12 | End: 2024-04-11

## 2024-03-12 RX ORDER — LEVETIRACETAM 500 MG/1
500 TABLET ORAL EVERY 12 HOURS SCHEDULED
Qty: 60 TABLET | Refills: 0 | Status: SHIPPED | OUTPATIENT
Start: 2024-03-12 | End: 2024-04-11

## 2024-03-12 RX ADMIN — ASPIRIN 81 MG: 81 TABLET ORAL at 09:17

## 2024-03-12 RX ADMIN — LEVETIRACETAM 500 MG: 500 TABLET, FILM COATED ORAL at 09:17

## 2024-03-12 RX ADMIN — ENOXAPARIN SODIUM 40 MG: 40 INJECTION SUBCUTANEOUS at 09:17

## 2024-03-12 NOTE — PROGRESS NOTES
[facility]   PROGRESS NOTE- Leighann Dolan, 1960, 63 y.o. female MRN: [id number]   Unit/Bed#: S /S -01 Encounter: [id number]   Primary Care Physician: Bay Bertrand DO   Date and Time Admitted to Hospital: 3/9/2024 11:54 AM     Assessment/Plan:   * Phenytoin toxicity  Assessment & Plan  Patient has been taking 100 mg phenytoin 3 times a day for the past more than 40 years.  Many years ago, patient had ataxia complicated with fall and patient was try to wean off phenytoin but patient had breakthrough seizure and patient was put on phenytoin 100 mg 3 times a day again.  During this admission, phenytoin level: 50.6>39.8>33.8>31  EKG: ST elevation in lead I and QRS complex widening, possibly changes because of phenytoin, antiarrhythmic class Ib  CTA head unremarkable  Stopped Phenytoin   MRI- Small foci of mildly restricted diffusion within the right corona radiata may represent a late acute to early subacute infarct versus artifact, Asymmetric bilateral cerebellar atrophy relative to supratentorial atrophy. Focal FLAIR signal abnormality within the left corona radiata, nonspecific however favored to represent microangiopathic changes in this age group, Near complete opacification of the left sphenoid sinus with T2 hypointense signal, which could represent inspissated debris versus fungal infection.     elevated  TTE did not show PFO; 60% EF, G1DD  Patient's ataxia is likely due to her phenytoin toxicity       Plan:  Appreciate neurology recommendations - Started patient on ASA and statin for possible CVA prevention   Continue Keppra 500mg BID   Seizure and fall precautions       Hyperlipidemia  Assessment & Plan  Patient was found to have an elevated cholesterol 212 as well as an elevated LDL at 125.  These lab values as well as the patient's questionable CVA suggest that the patient would benefit from statin therapy.    Plan:  Continue Lipitor 40 mg daily.    COPD  (chronic obstructive pulmonary disease) (HCC)  Assessment & Plan  Quit smoking 1 year ago.  CXR showed hyperinflated lungs, possibly emphysema.    Thyroid nodule  Assessment & Plan  Patient has multinodular goiter.  The most recent USG thyroid was unremarkable.    Plan:  Follow-up with thyroid USG every 1 year.  With endocrinology as an outpatient    Osteoporosis  Assessment & Plan  Patient has osteoporosis history but does not take bisphosphonate anymore  She does not take calcium and vitamin D as well.    Rheumatoid arthritis of multiple sites with negative rheumatoid factor (HCC)  Assessment & Plan  Patient does not take hydroxychloroquine anymore and takes over-the-counter supplement.         VTE Pharmacologic Prophylaxis: VTE Score: 3 Moderate Risk (Score 3-4) - Pharmacological DVT Prophylaxis Ordered: enoxaparin (Lovenox).    Patient Centered Rounds:  Performed bedside rounds with nursing staff.   Discussions with Specialists or Other Care Team Provider: Senior resident and attending  Education and Discussions with Family / Patient: Patient declined call to .     Current Length of Stay: 3 day(s)  Current Patient Status: Inpatient   Discharge Plan: Anticipate discharge in 24-48 hrs to home.    Code Status: Level 1 - Full Code    Subjective:   No acute events overnight.  Patient reports somewhat of an improvement in her walking.  Otherwise the patient is not noting any weakness, numbness or tingling, facial droop, dizziness, headache or other focal deficits at this time.    Objective:     Vitals:   Temp (24hrs), Av.7 °F (36.5 °C), Min:97.6 °F (36.4 °C), Max:97.8 °F (36.6 °C)    Temp:  [97.6 °F (36.4 °C)-97.8 °F (36.6 °C)] 97.8 °F (36.6 °C)  HR:  [65-76] 71  Resp:  [17-19] 19  BP: ()/(50-75) 129/61  SpO2:  [96 %-97 %] 96 %  Body mass index is 20.03 kg/m².     Input and Output Summary (last 24 hours):     Intake/Output Summary (Last 24 hours) at 3/12/2024 1307  Last data filed at 3/12/2024  1039  Gross per 24 hour   Intake 100 ml   Output 1450 ml   Net -1350 ml       Physical Exam  Vitals and nursing note reviewed.   Constitutional:       General: She is not in acute distress.     Appearance: Normal appearance. She is well-developed and normal weight. She is not ill-appearing, toxic-appearing or diaphoretic.   HENT:      Head: Normocephalic and atraumatic.      Right Ear: External ear normal.      Left Ear: External ear normal.      Mouth/Throat:      Mouth: Mucous membranes are moist.   Eyes:      General: No scleral icterus.        Right eye: No discharge.         Left eye: No discharge.      Extraocular Movements: Extraocular movements intact.      Conjunctiva/sclera: Conjunctivae normal.   Cardiovascular:      Rate and Rhythm: Normal rate and regular rhythm.      Pulses: Normal pulses.      Heart sounds: Normal heart sounds. No murmur heard.     No friction rub. No gallop.   Pulmonary:      Effort: Pulmonary effort is normal. No respiratory distress.      Breath sounds: Normal breath sounds. No wheezing or rhonchi.   Chest:      Chest wall: No tenderness.   Abdominal:      General: Abdomen is flat. Bowel sounds are normal. There is no distension.      Palpations: Abdomen is soft.      Tenderness: There is no abdominal tenderness. There is no guarding or rebound.   Musculoskeletal:         General: No tenderness. Normal range of motion.      Cervical back: Normal range of motion and neck supple. No rigidity or tenderness.      Right lower leg: No edema.      Left lower leg: No edema.   Skin:     General: Skin is warm and dry.      Capillary Refill: Capillary refill takes less than 2 seconds.   Neurological:      General: No focal deficit present.      Mental Status: She is alert.      Cranial Nerves: No cranial nerve deficit, dysarthria or facial asymmetry.      Sensory: Sensation is intact. No sensory deficit.      Motor: Motor function is intact. No weakness, tremor or atrophy.      Coordination:  Coordination is intact. Romberg sign negative. Finger-Nose-Finger Test and Heel to Shin Test normal.   Psychiatric:         Mood and Affect: Mood normal.         Behavior: Behavior normal.         Thought Content: Thought content normal.          Additional Data:     Labs:  Results from last 7 days   Lab Units 03/12/24  0459 03/11/24  0440 03/10/24  0000   WBC Thousand/uL 5.31   < > 6.15   HEMOGLOBIN g/dL 11.5   < > 11.0*   HEMATOCRIT % 37.1   < > 35.1   PLATELETS Thousands/uL 281   < > 274   NEUTROS PCT %  --   --  51   LYMPHS PCT %  --   --  37   MONOS PCT %  --   --  10   EOS PCT %  --   --  1    < > = values in this interval not displayed.     Results from last 7 days   Lab Units 03/12/24  0459 03/10/24  0000 03/09/24  1218   SODIUM mmol/L 139   < > 138   POTASSIUM mmol/L 4.1   < > 4.3   CHLORIDE mmol/L 107   < > 105   CO2 mmol/L 28   < > 28   BUN mg/dL 16   < > 16   CREATININE mg/dL 0.61   < > 0.64   ANION GAP mmol/L 4   < > 5   CALCIUM mg/dL 8.9   < > 9.3   ALBUMIN g/dL  --   --  4.1   TOTAL BILIRUBIN mg/dL  --   --  0.28   ALK PHOS U/L  --   --  192*   ALT U/L  --   --  10   AST U/L  --   --  13   GLUCOSE RANDOM mg/dL 95   < > 93    < > = values in this interval not displayed.     Results from last 7 days   Lab Units 03/09/24  1218   INR  0.98     Results from last 7 days   Lab Units 03/09/24  1155   POC GLUCOSE mg/dl 104     Results from last 7 days   Lab Units 03/09/24  1218   HEMOGLOBIN A1C % 5.7*           Lines/Drains:  Invasive Devices       Peripheral Intravenous Line  Duration             Peripheral IV 03/10/24 Dorsal (posterior);Left Forearm 2 days                      Telemetry:  Telemetry Orders (From admission, onward)               24 Hour Telemetry Monitoring  Continuous x 24 Hours (Telem)        Question:  Reason for 24 Hour Telemetry  Answer:  Drug overdose known to cause cardiac arrhythmias (e.g. - Cocaine, TCA, Amphetamines, Phenothiazines, Digoxin, etc.) Meds known to need cardiac  monitoring: Amiodarone, Dopamine, Dobutamine, Phenytoin                     Telemetry Reviewed: Normal Sinus Rhythm  Indication for Continued Telemetry Use: Metabolic/electrolyte disturbance with high probability of dysrhythmia           Imaging: Reviewed pertinent radiology reports from this admission.   Recent Cultures (last 7 days):         Last 24 Hours Medication List:   Current Facility-Administered Medications   Medication Dose Route Frequency Provider Last Rate    acetaminophen  650 mg Oral Q6H PRN Ling Walton MD      aspirin  81 mg Oral Daily Kaitlynn Jackman MD      atorvastatin  40 mg Oral HS Kaitlynn Jackman MD      enoxaparin  40 mg Subcutaneous Q24H [facility] Ling Walton MD      levETIRAcetam  500 mg Oral Q12H [facility] Apolinar Kohli DO      sodium chloride  1 spray Each Nare Q1H PRN Apolinar Kohli DO          Today, Patient Was Seen By: Remigio Villatoro DO    **Please Note: This note may have been constructed using a voice recognition system.**

## 2024-03-12 NOTE — ASSESSMENT & PLAN NOTE
Patient was found to have an elevated cholesterol 212 as well as an elevated LDL at 125.  These lab values as well as the patient's questionable CVA suggest that the patient would benefit from statin therapy.    Plan:  Continue Lipitor 40 mg daily.

## 2024-03-12 NOTE — DISCHARGE INSTR - AVS FIRST PAGE
Dear Leighann Dolan,     [facility]  It is our hope that we were always able to exceed the expected standards for your care during your stay.  You were hospitalized due to a fall and high phenytoin levels.  You were cared for on the [place] fourth floor by Remigio Villatoro DO under the service of Rei Siegel* with the [facility] who covers for your primary care physician (PCP), Bay Bertrand DO, while you were hospitalized.  If you have any questions or concerns related to this hospitalization, you may contact us at .  For follow up as well as any medication refills, we recommend that you follow up with your primary care physician.  A registered nurse will reach out to you by phone within a few days after your discharge to answer any additional questions that you may have after going home.  However, at this time we provide for you here, the most important instructions / recommendations at discharge:     Notable Medication Adjustments -   Please continue taking Keppra 500 mg twice daily  Please continue taking atorvastatin 40 mg once a day  Please continue taking aspirin 81 mg once a day  Please stop taking phenytoin.  Testing Required after Discharge -   None  ** Please contact your PCP to request testing orders for any of the testing recommended here **  Important follow up information -   Please follow-up with your PCP within 1 week of discharge  Please follow-up with neurology within 6 weeks discharge  Please follow-up with ENT within 1 week of discharge  Other Instructions -   If you have worsening dizziness or difficulty with your balance causing you to fall please return to the ED to be evaluated.  Please review this entire after visit summary as additional general instructions including medication list, appointments, activity, diet, any pertinent wound care, and other  additional recommendations from your care team that may be provided for you.      Sincerely,     Remigio Villatoro, DO

## 2024-03-12 NOTE — PLAN OF CARE
Problem: OCCUPATIONAL THERAPY ADULT  Goal: Performs self-care activities at highest level of function for planned discharge setting.  See evaluation for individualized goals.  Description: Treatment Interventions: ADL retraining, Functional transfer training, Equipment evaluation/education, Patient/family training, Activityengagement (fall prevention education/safety, functional balance training, IADL engagement)          See flowsheet documentation for full assessment, interventions and recommendations.   Outcome: Progressing  Note: Limitation: Decreased ADL status, Decreased endurance, Decreased high-level ADLs, Decreased self-care trans (impaired balance, fxnl mobility, act hansa, fxnl reach  , dec'd IADL)  Prognosis: Good  Assessment: Pt is a 63 y.o. female seen for OT evaluation s/p admission to [facility] on 3/9/2024  due to having inc'd difficulty walking over the course of 2 weeks. Pt also lightheaded with change of position and noticed left pupil slightly larger than the right. Pt diagnosis: Phenytoin toxicity. Pt's PMH impacting occupational performance is listed above. Pt's PLOF also listed in above flowsheet. Pt is highly motivated to return to home and PLOF. Personal and environmental factors supporting pt at time of IE include age, (I) PLOF, and supportive spouse . Personal and environmental factors inhibiting engagement in occupations include  stairs to enter home and mild dec'd independence with higher level ADls and IADl tasks at this time . During evaluation pt performed as is outlined above in flowsheet.  Standardized assessments used to assist in identifying performance deficits include [facility] 6-Clicks. Pt's raw score on the AM-PAC Daily activity inpatient short form is 4, standardized score is 44.27, which is greater than 39.4. Patients at this level are likely to benefit from DC to home- which coincides with this writers d/c recommendation. Performance deficits that affect the pt’s  occupational performance can be seen above. Due to pt's current functional limitations and medical complications, pt is functioning below baseline level of independence. Pt would benefit from continued skilled OT treatment in order to maximize safety, independence and overall performance with ADLs, IADLs, functional mobility, and functional transfers in order to achieve highest level of function. Based on functional performance deficits and assessments, this evaluation is identified as a moderate complexity evaluation.     Rehab Resource Intensity Level, OT: III (Minimum Resource Intensity)

## 2024-03-12 NOTE — CASE MANAGEMENT
Case Management Progress Note    Patient name Leighann Dolan  Location S /S -01 MRN [id number]  : 1960 Date 3/12/2024       LOS (days): 3  Geometric Mean LOS (GMLOS) (days):   Days to GMLOS:        OBJECTIVE:        Current admission status: Inpatient  Preferred Pharmacy:   [facility] - [place], [place] - [address] [place] [address]  [address] [facility] [address] [facility] [address]  Phone: [phone] Fax: [phone]    [id number] - [place], [place] - [address]  [address] [address]  Phone: [phone] Fax: [phone]    Primary Care Provider: Bay Bertrand DO    Primary Insurance: BLUE CROSS  Secondary Insurance:     PROGRESS NOTE:    CM met with patient at bedside this AM to discuss PT recommendation from yesterday afternoon -- Patient reports preferring to participate in OP PT upon discharge. She is not interested in [facility] services. Patient declined CM's offer to provide list of available OP PT locations as she is already aware of the building near her home. No further CM needs indicated at this time; CM will remain available.

## 2024-03-12 NOTE — DISCHARGE SUMMARY
Formerly [facility], later [facility]   DISCHARGE- Leighann Dolan, 1960, 63 y.o. female MRN: [id number]   Unit/Bed#: S /S -01 Encounter: [id number]   Primary Care Physician: Bay Bertrand DO   Date and Time Admitted to Hospital: 3/9/2024 11:54 AM     Assessment/Plan    * Phenytoin toxicity  Assessment & Plan  Patient has been taking 100 mg phenytoin 3 times a day for the past more than 40 years.  Many years ago, patient had ataxia complicated with fall and patient was try to wean off phenytoin but patient had breakthrough seizure and patient was put on phenytoin 100 mg 3 times a day again.  During this admission, phenytoin level: 50.6>39.8>33.8>31  EKG: ST elevation in lead I and QRS complex widening, possibly changes because of phenytoin, antiarrhythmic class Ib  CTA head unremarkable  Stopped Phenytoin   MRI- Small foci of mildly restricted diffusion within the right corona radiata may represent a late acute to early subacute infarct versus artifact, Asymmetric bilateral cerebellar atrophy relative to supratentorial atrophy. Focal FLAIR signal abnormality within the left corona radiata, nonspecific however favored to represent microangiopathic changes in this age group, Near complete opacification of the left sphenoid sinus with T2 hypointense signal, which could represent inspissated debris versus fungal infection.     elevated  TTE did not show PFO; 60% EF, G1DD  Patient's ataxia is likely due to her phenytoin toxicity       Plan:  Appreciate neurology recommendations - Started patient on ASA and statin for possible CVA prevention   Continue Keppra 500mg BID   Will need neurology follow-up in 6 weeks  For the incidental finding of the opacification of the left sphenoid sinus patient will need to see ENT, referral placed.      Hyperlipidemia  Assessment & Plan  Patient was found to have an elevated cholesterol 212 as well as an elevated LDL at 125.  These lab values as well as the  patient's questionable CVA suggest that the patient would benefit from statin therapy.    Plan:  Continue Lipitor 40 mg daily.    COPD (chronic obstructive pulmonary disease) (HCC)  Assessment & Plan  Quit smoking 1 year ago.  CXR showed hyperinflated lungs, possibly emphysema.    Thyroid nodule  Assessment & Plan  Patient has multinodular goiter.  The most recent [facility] thyroid was unremarkable.    Plan:  Follow-up with thyroid [facility] every 1 year.  With endocrinology as an outpatient    Osteoporosis  Assessment & Plan  Patient has osteoporosis history but does not take bisphosphonate anymore  She does not take calcium and vitamin D as well.    Rheumatoid arthritis of multiple sites with negative rheumatoid factor ([facility])  Assessment & Plan  Patient does not take hydroxychloroquine anymore and takes over-the-counter supplement.       Medical Problems       Resolved Problems  Date Reviewed: 3/12/2024   None             Discharging Resident: Remigio Villatoro DO  Discharging Attending: Rei Siegel*  PCP: Bay Bertrand DO  Admission Date:   Admission Orders (From admission, onward)       Ordered        03/09/24 1456  INPATIENT ADMISSION  Once                          Discharge Date: 03/12/24    Consultations During Hospital Stay:  IP CONSULT TO TOXICOLOGY  IP CONSULT TO NEUROLOGY     Procedures Performed:   None    Significant Findings / Test Results:   Phenytoin levels were significantly elevated at 50.6.  MRI brain: Small foci of mildly restricted diffusion within the right corona radiata may represent a late acute to early subacute infarct versus artifact. Asymmetric bilateral cerebellar atrophy relative to supratentorial atrophy. Focal FLAIR signal abnormality within the left corona radiata, nonspecific however favored to represent microangiopathic changes in this age group. Near complete opacification of the left sphenoid sinus with T2 hypointense signal, which could represent inspissated debris  versus fungal infection.     Incidental Findings:   As above  I reviewed the above mentioned incidental findings with the patient and/or family and they expressed understanding.    Test Results Pending at Discharge (will require follow up):  None     Outpatient Tests Requested:  None    Complications: None    Reason for Admission: Fall, ataxia, nystagmus, phenytoin toxicity    Hospital Course:   Leighann Dolan is a 63 y.o. female who presented to the hospital on 3/9/2024 initially due to  fall.  Subsequently trauma workup was unremarkable.  Medical toxicology was consulted for phenytoin toxicity.  Phenytoin toxicity can cause ataxia, nystagmus, arrhythmia.  Keppra 500 mg twice daily is here for seizure and phenytoin has been stopped per neurology.  Phenytoin levels have been downtrending from 50 to now 31.  Anticipating further, phenytoin washout as time goes on.  MRI brain was also ordered and a questionable CVA versus artifact was seen in the left corona radiata as well as asymmetrical bilateral cerebral atrophy likely related to her long standing phenytoin use.  Neurology recommended the patient to start taking a baby aspirin as well as a statin after lipid panel showed hyperlipidemia.  Throughout her hospital admission patient has been working with physical therapy and notes that her walking has improved day by day.  As the phenytoin leaves her system I suspect the patient's ataxia should subside.  Patient will need outpatient physical therapy as well as outpatient neurology follow-up in 6 weeks.    Incidentally the MRI brain also noted to be in complete opacification of her left sphenoid sinus suggesting possible inspissated debris versus fungal infection.  Patient did note that she has history of recurrent sinus infections however the last one was several months ago.  Recommended ENT follow-up when the patient is able to get an appointment.        Please see above list of diagnoses and related plan for  "additional information.     Condition at Discharge: Stable    Discharge Day Visit / Exam:   Please refer to same-day progress note for subjective and physical exam.    Vitals: Blood Pressure: 129/61 (03/12/24 0755)  Pulse: 71 (03/12/24 0755)  Temperature: 97.8 °F (36.6 °C) (03/12/24 0755)  Temp Source: Oral (03/11/24 1520)  Respirations: 19 (03/12/24 0755)  Height: 5' 1\" (154.9 cm) (03/11/24 0930)  Weight - Scale: 48.1 kg (106 lb) (03/11/24 0930)  SpO2: 96 % (03/12/24 0755)        Discussion with Family: Updated  () at bedside.    Discharge instructions/Information to patient and family:   See after visit summary for information provided to patient and family.      Provisions for Follow-Up Care:  See after visit summary for information related to follow-up care and any pertinent home health orders.       Disposition:   Home    Planned Readmission: No     Discharge Medications:  See after visit summary for reconciled discharge medications provided to patient and/or family.      **Please Note: This note may have been constructed using a voice recognition system**    "

## 2024-03-12 NOTE — OCCUPATIONAL THERAPY NOTE
Occupational Therapy Evaluation      Leighann Dolan    3/12/2024    Principal Problem:    Phenytoin toxicity  Active Problems:    Rheumatoid arthritis of multiple sites with negative rheumatoid factor (HCC)    Osteoporosis    Thyroid nodule    COPD (chronic obstructive pulmonary disease) (HCC)      Past Medical History:   Diagnosis Date    Arthritis     Hyperparathyroidism (HCC)     Osteoporosis     Pollen allergy     Seizure disorder (HCC)        Past Surgical History:   Procedure Laterality Date    BUNIONECTOMY      PARATHYROID GLAND SURGERY      NV PARATHYROIDECTOMY/EXPLORATION PARATHYROIDS Left 02/25/2016    Procedure: MINIMALLY INVASIVE PARATHYROIDECTOMY; PTH MONITORING;  Surgeon: Erlin Camarena MD;  Location: [facility];  Service: Surgical Oncology    THYROID SURGERY      TUBAL LIGATION          03/12/24 2889   Note Type   Note type Evaluation   Pain Assessment   Pain Assessment Tool 0-10   Pain Score No Pain   Restrictions/Precautions   Other Precautions Fall Risk;Hard of hearing;Seizure   Home Living   Type of Home House   Home Layout One level;Stairs to enter with rails   Bathroom Shower/Tub Tub/shower unit   Bathroom Toilet Standard   Bathroom Equipment Grab bars in shower   Bathroom Accessibility Accessible   Prior Function   Level of [place] Independent with ADLs;Independent with functional mobility   Lives With Spouse   Receives Help From Family   IADLs Independent with driving;Independent with meal prep;Independent with medication management   Falls in the last 6 months 1 to 4  (2 falls- one fall off of toilet and fall in kitchen & reports hit head on stove.)   Vocational Retired  (worked at [employer].)   Lifestyle   Reciprocal Relationships supportive spouse. Pt reports having a local sister as well. Daughter and grandson do not live loca.   Intrinsic Gratification TV, visiting sister, father in law, and sister in law.   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\"   ADL   Where Assessed Standing at sink   Eating Assistance 7  Independent   Grooming Assistance 5  Supervision/Setup   Grooming Deficit Supervision/safety;Wash/dry hands   UB Bathing Assistance 5  Supervision/Setup   LB Bathing Assistance 5  Supervision/Setup   UB Dressing Assistance 5  Supervision/Setup   LB Dressing Assistance 5  Supervision/Setup   LB Dressing Deficit Supervision/safety   Toileting Assistance  5  Supervision/Setup   Toileting Deficit Clothing management up;Clothing management down;Supervison/safety   Bed Mobility   Supine to Sit 6  Modified independent   Additional items HOB elevated   Sit to Supine 7  Independent   Additional items   (anterior approach to return to bed.)   Additional Comments Pt paused upon sitting EOB to \"wait for the dizziness to go away\" and then reports feeling \"perfectly fine\" before standing to ambulate to restroom.   Transfers   Sit to Stand 6  Modified independent   Additional items Increased time required   Stand to Sit 6  Modified independent   Additional items Increased time required   Toilet transfer 5  Supervision   Additional items Increased time required;Standard toilet   Functional Mobility   Functional Mobility 5  Supervision   Additional Comments no overt LOB noted. No AD used to ambulate to/from restroom. Supervision for pt safety provided. Pt required inc'd time only but states, \"I am doing much better today than I was doing!\"   Balance   Static Sitting Good   Dynamic Sitting Good   Static Standing Fair +   Dynamic Standing Fair   Activity Tolerance   Activity Tolerance Patient tolerated treatment well   RUE Assessment   RUE Assessment WFL   LUE Assessment   LUE Assessment WFL   Hand Function   Gross Motor Coordination Functional   Fine Motor Coordination Functional   Sensation   Additional " "Comments reports sensation in UE's WNL. Reports \"sometimes I have numbness in my face- like my cheeks. But then I touch my cheeks and I can feel that and it feels fine. It's been going on for awhile every now and again but it's fine right now.\"   Vision-Basic Assessment   Current Vision Wears glasses all the time   Vision - Complex Assessment   Acuity Able to read clock/calendar on wall without difficulty   Additional Comments Did not formally assess 2' pt reports \"my vision is fine. It's not blurry. Nothing has changed and I can see fine\"   Psychosocial   Psychosocial (WDL) WDL   Cognition   Overall Cognitive Status WFL   Arousal/Participation Alert   Attention Within functional limits   Orientation Level Oriented X4   Memory Within functional limits   Following Commands Follows all commands and directions without difficulty   Comments Pt veriried ID by stating name and .   Assessment   Limitation Decreased ADL status;Decreased endurance;Decreased high-level ADLs;Decreased self-care trans  (impaired balance, fxnl mobility, act hansa, fxnl reach  , dec'd IADL)   Prognosis Good   Assessment Pt is a 63 y.o. female seen for OT evaluation s/p admission to [facility] on 3/9/2024  due to having inc'd difficulty walking over the course of 2 weeks. Pt also lightheaded with change of position and noticed left pupil slightly larger than the right. Pt diagnosis: Phenytoin toxicity. Pt's PMH impacting occupational performance is listed above. Pt's PLOF also listed in above flowsheet. Pt is highly motivated to return to home and PLOF. Personal and environmental factors supporting pt at time of IE include age, (I) PLOF, and supportive spouse . Personal and environmental factors inhibiting engagement in occupations include  stairs to enter home and mild dec'd independence with higher level ADls and IADl tasks at this time . During evaluation pt performed as is outlined above in flowsheet.  Standardized assessments used " to assist in identifying performance deficits include [facility] 6-Clicks. Pt's raw score on the AM-PAC Daily activity inpatient short form is 4, standardized score is 44.27, which is greater than 39.4. Patients at this level are likely to benefit from DC to home- which coincides with this writers d/c recommendation. Performance deficits that affect the pt’s occupational performance can be seen above. Due to pt's current functional limitations and medical complications, pt is functioning below baseline level of independence. Pt would benefit from continued skilled OT treatment in order to maximize safety, independence and overall performance with ADLs, IADLs, functional mobility, and functional transfers in order to achieve highest level of function. Based on functional performance deficits and assessments, this evaluation is identified as a moderate complexity evaluation.   Goals   Patient Goals to go home   LTG Time Frame 7-10   Long Term Goal #1 see goals below   Plan   Treatment Interventions ADL retraining;Functional transfer training;Equipment evaluation/education;Patient/family training;Activityengagement  (fall prevention education/safety, functional balance training, IADL engagement)   Goal Expiration Date 03/22/24   OT Frequency 3-5x/wk   Discharge Recommendation   Rehab Resource Intensity Level, OT III (Minimum Resource Intensity)   AM-PAC Daily Activity Inpatient   Lower Body Dressing 3   Bathing 3   Toileting 3   Upper Body Dressing 4   Grooming 4   Eating 4   Daily Activity Raw Score 21   Daily Activity Standardized Score (Calc for Raw Score >=11) 44.27   AM-PAC Applied Cognition Inpatient   Following a Speech/Presentation 4   Understanding Ordinary Conversation 4   Taking Medications 4   Remembering Where Things Are Placed or Put Away 4   Remembering List of 4-5 Errands 4   Taking Care of Complicated Tasks 4   Applied Cognition Raw Score 24   Applied Cognition Standardized Score 62.21   End of Consult  "  Education Provided Yes;Family or social support of family present for education by provider   Patient Position at End of Consult All needs within reach;Supine  (HOB elevated)   Nurse Communication Nurse aware of consult     GOALS:  Goals established to facilitate pt's goal to return to \"go home\"    Pt will achieve the following within specified time frame:  *Perform ADL transfers at mod (I) level for inc'd independence with ADLs/purposeful tasks    *Perform LB ADL at mod (I)/(I) level using AE prn for inc'd independence with self cares    *Increase static stand balance to Good and dyn stand balance to Fair+ for inc'd safety with standing purposeful tasks    *Increase stand tolerance x7-10m for inc'd tolerance with standing purposeful tasks    *Perform clothing management for toileting at an (I) level for inc'd independence with self cares    *Perform tub/shower transfer using most appropriate technique (step in vs. Transfer to DME) mod (I)/(I) for inc'd safety and independence with bathing    *Perform sinkside G&H (I)ly, with good safety and Fair+ balance for inc'd independence with self cares    *Pt will verbalize 2-3 fall prevention techniques to utilize in order to complete purposeful/ADL/IADL tasks safely in order to decrease risk of falls      Thank you  Rosalina Ortiz OT  "

## 2024-03-12 NOTE — QUICK NOTE
63-year-old female with epilepsy on phenytoin presented 3/9/24 with multiple falls and ataxic gait. Work-up revealed phenytoin toxicity with level >50. Phenytoin discontinued, started on keppra 500mg bid without adverse effects or events concerning for seziure. CTA H/N without acute findings; MRI brain revealed possible right corona radiata infarct vs artifact, cerebellar atrophy noted. Started on asa 81mg and atorvastatin 40mg daily. ECHO with ef 60%, no PFO. LDL 60, A1c 5.7.    Patient continues to improve clinically, however she states that she still feels a little unsteady with ambulation. No focal neurological deficits noted on exam. Phenytoin level 31.6 this morning.    Recommendations:  Continue to hold phenytoin, continue Keppra 500mg bid  Continue asa 81mg daily  Continue atorvastatin 40mg daily  PT/OT  Seizure/fall precautions  Outpatient follow up in about 6 weeks with neurovascular attending. She will not require outpatient neurological testing.

## 2024-03-12 NOTE — PLAN OF CARE
Problem: Potential for Falls  Goal: Patient will remain free of falls  Description: INTERVENTIONS:  - Educate patient/family on patient safety including physical limitations  - Instruct patient to call for assistance with activity   - Consult OT/PT to assist with strengthening/mobility   - Keep Call bell within reach  - Keep bed low and locked with side rails adjusted as appropriate  - Keep care items and personal belongings within reach  - Initiate and maintain comfort rounds  - Make Fall Risk Sign visible to staff  - Offer Toileting salvatore Hours, in advance of need  - Initiate/Maintain alarm  - Obtain necessary fall risk management equipment:   - Apply yellow socks and bracelet for high fall risk patients  - Consider moving patient to room near nurses station  Outcome: Progressing     Problem: PAIN - ADULT  Goal: Verbalizes/displays adequate comfort level or baseline comfort level  Description: Interventions:  - Encourage patient to monitor pain and request assistance  - Assess pain using appropriate pain scale  - Administer analgesics based on type and severity of pain and evaluate response  - Implement non-pharmacological measures as appropriate and evaluate response  - Consider cultural and social influences on pain and pain management  - Notify physician/advanced practitioner if interventions unsuccessful or patient reports new pain  Outcome: Progressing     Problem: SAFETY ADULT  Goal: Patient will remain free of falls  Description: INTERVENTIONS:  - Educate patient/family on patient safety including physical limitations  - Instruct patient to call for assistance with activity   - Consult OT/PT to assist with strengthening/mobility   - Keep Call bell within reach  - Keep bed low and locked with side rails adjusted as appropriate  - Keep care items and personal belongings within reach  - Initiate and maintain comfort rounds  - Make Fall Risk Sign visible to staff  - Offer Toileting every  Hours, in advance of  need  - Initiate/Maintain alarm  - Obtain necessary fall risk management equipment:   - Apply yellow socks and bracelet for high fall risk patients  - Consider moving patient to room near nurses station  Outcome: Progressing  Goal: Maintain or return to baseline ADL function  Description: INTERVENTIONS:  -  Assess patient's ability to carry out ADLs; assess patient's baseline for ADL function and identify physical deficits which impact ability to perform ADLs (bathing, care of mouth/teeth, toileting, grooming, dressing, etc.)  - Assess/evaluate cause of self-care deficits   - Assess range of motion  - Assess patient's mobility; develop plan if impaired  - Assess patient's need for assistive devices and provide as appropriate  - Encourage maximum independence but intervene and supervise when necessary  - Involve family in performance of ADLs  - Assess for home care needs following discharge   - Consider OT consult to assist with ADL evaluation and planning for discharge  - Provide patient education as appropriate  Outcome: Progressing  Goal: Maintains/Returns to pre admission functional level  Description: INTERVENTIONS:  - Perform AM-PAC 6 Click Basic Mobility/ Daily Activity assessment daily.  - Set and communicate daily mobility goal to care team and patient/family/caregiver.   - Collaborate with rehabilitation services on mobility goals if consulted  - Perform Range of Motion  times a day.  - Reposition patient every  hours.  - Dangle patient  times a day  - Stand patient  times a day  - Ambulate patient  times a day  - Out of bed to chair  times a day   - Out of bed for meals  times a day  - Out of bed for toileting  - Record patient progress and toleration of activity level   Outcome: Progressing     Problem: NEUROSENSORY - ADULT  Goal: Achieves stable or improved neurological status  Description: INTERVENTIONS  - Monitor and report changes in neurological status  - Monitor vital signs such as temperature,  blood pressure, glucose, and any other labs ordered   - Initiate measures to prevent increased intracranial pressure  - Monitor for seizure activity and implement precautions if appropriate      Outcome: Progressing  Goal: Remains free of injury related to seizures activity  Description: INTERVENTIONS  - Maintain airway, patient safety  and administer oxygen as ordered  - Monitor patient for seizure activity, document and report duration and description of seizure to physician/advanced practitioner  - If seizure occurs,  ensure patient safety during seizure  - Reorient patient post seizure  - Seizure pads on all 4 side rails  - Instruct patient/family to notify RN of any seizure activity including if an aura is experienced  - Instruct patient/family to call for assistance with activity based on nursing assessment  - Administer anti-seizure medications if ordered    Outcome: Progressing  Goal: Achieves maximal functionality and self care  Description: INTERVENTIONS  - Monitor swallowing and airway patency with patient fatigue and changes in neurological status  - Encourage and assist patient to increase activity and self care.   - Encourage visually impaired, hearing impaired and aphasic patients to use assistive/communication devices  Outcome: Progressing     Problem: CARDIOVASCULAR - ADULT  Goal: Maintains optimal cardiac output and hemodynamic stability  Description: INTERVENTIONS:  - Monitor I/O, vital signs and rhythm  - Monitor for S/S and trends of decreased cardiac output  - Administer and titrate ordered vasoactive medications to optimize hemodynamic stability  - Assess quality of pulses, skin color and temperature  - Assess for signs of decreased coronary artery perfusion  - Instruct patient to report change in severity of symptoms  Outcome: Progressing  Goal: Absence of cardiac dysrhythmias or at baseline rhythm  Description: INTERVENTIONS:  - Continuous cardiac monitoring, vital signs, obtain 12 lead EKG  if ordered  - Administer antiarrhythmic and heart rate control medications as ordered  - Monitor electrolytes and administer replacement therapy as ordered  Outcome: Progressing

## 2024-03-15 NOTE — UTILIZATION REVIEW
NOTIFICATION OF ADMISSION DISCHARGE   This is a Notification of Discharge from [facility]. Please be advised that this patient has been discharge from our facility. Below you will find the admission and discharge date and time including the patient’s disposition.   UTILIZATION REVIEW CONTACT:  Luzmaria Rios MA  Utilization   Network Utilization Review Department  Phone: [phone] x carefully listen to the prompts. All voicemails are confidential.  Email: [email or web address]     ADMISSION INFORMATION  PRESENTATION DATE: 3/9/2024 11:54 AM  OBERVATION ADMISSION DATE:   INPATIENT ADMISSION DATE: 3/9/24  2:56 PM   DISCHARGE DATE: 3/12/2024  4:26 PM   DISPOSITION:Home/Self Care    Network Utilization Review Department  ATTENTION: Please call with any questions or concerns to [phone] and carefully listen to the prompts so that you are directed to the right person. All voicemails are confidential.   For Discharge needs, contact Care Management DC Support Team at [phone] opt. 2  Send all requests for admission clinical reviews, approved or denied determinations and any other requests to dedicated fax number below belonging to the campus where the patient is receiving treatment. List of dedicated fax numbers for the Facilities:  FACILITY NAME UR FAX NUMBER   ADMISSION DENIALS (Administrative/Medical Necessity) [phone]   DISCHARGE SUPPORT TEAM ([facility]) [phone]   PARENT CHILD HEALTH (Maternity/NICU/Pediatrics) [phone]   [facility] [phone]   [facility] [phone]   [facility] [phone]   General acute hospital [phone]   [facility] [phone]   [facility] [phone]   [facility] [phone]   [facility] [phone]    [facility] [phone]   [facility] [phone]   [facility] [phone]   [facility] [phone]

## 2024-03-18 ENCOUNTER — EVALUATION (OUTPATIENT)
Dept: PHYSICAL THERAPY | Facility: MEDICAL CENTER | Age: 64
End: 2024-03-18
Payer: COMMERCIAL

## 2024-03-18 VITALS — SYSTOLIC BLOOD PRESSURE: 134 MMHG | DIASTOLIC BLOOD PRESSURE: 82 MMHG

## 2024-03-18 DIAGNOSIS — R27.0 ATAXIA: ICD-10-CM

## 2024-03-18 DIAGNOSIS — T42.0X4D: ICD-10-CM

## 2024-03-18 DIAGNOSIS — T42.0X1A DILANTIN TOXICITY: ICD-10-CM
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
## 2024-03-18 PROCEDURE — 97162 PT EVAL MOD COMPLEX 30 MIN: CPT | Performed by: PHYSICAL THERAPIST

## 2024-03-18 NOTE — PROGRESS NOTES
PT Evaluation          POC expires Auth Status Total   Visits  Start date  Expiration date PT/OT + Visit Limit? Co-Insurance   6/10/24 [id number] 12 3/14/24 6/30/24                                             Visit/Unit Tracking  AUTH Status:  Date 3/18              Visits  Authed: 12 Used 1               Remaining  11                         Today's date: 3/18/2024  Patient name: Leighann Dolan  : 1960  MRN: [id number]  Referring provider: Kaitlynn Jackman MD  Dx:   Encounter Diagnosis     ICD-10-CM    1. Fall, subsequent encounter  W19.XXXD       2. Dilantin toxicity  T42.0X1A Ambulatory referral to Physical Therapy      3. Ataxia  R27.0 Ambulatory referral to Physical Therapy            Assessment  Assessment details: Patient is a 63 y.o. Female who presents to skilled outpatient PT with complaints of balance deficits resulting in falls. Patient reports approximately 1 week ago she fell backwards striking the back of her head resulting in hospitalization which revealed toxic levels of medication that she is no longer taking. Upon arrival all sensation and coordination testing is WNL. Based on her MMT and 5xSTS patient displays reduced functional LE strength. She scores WNL for her TUG however she did have 1 LOB when turning around and showed >10% difference with TUG and TUG COG suggesting possible difficulty with dual tasking which may lead to increased risk of falls. During mCTSIB patient displays increased sway with FTEC and is unable to maintain balance for FTEC on foam conditions suggesting increased reliance on visual and somatosensory input to maintain balance. Based on her FGA score patient is at HIGH risk of falls. Patient will benefit from skilled PT services to improve her functional strength, improve her balance, and reduce her risk of falls to maximize her level of safety at home and in the community.         Impairments:  Activity intolerance, Impaired balance, Impaired physical strength, Lacks appropriate HEP, Safety issue, Abnormal movement  Understanding of Dx/Px/POC: Good  Prognosis: Good    Patient verbalized understanding of POC.         Please contact me if you have any questions or recommendations. Thank you for the referral and the opportunity to share in Leighann Dolan's care.        Plan  Plan details:   Patient would benefit from: Skilled PT  Planned modality interventions: Cryotherapy, Thermotherapy  Planned therapy interventions: Balance, Balance/WB training, Body mechanics training, Coordination, Gait training, HEP, Manual Therapy, Motor coordination training, Neuromuscular re-education, Patient education, Postural training, Strengthening, Stretching, Therapeutic activities, Therapeutic exercises, Therapeutic training  Frequency: 2x/wk  Duration in weeks: 12  Plan of Care beginning date: 3/18/2024  Plan of Care expiration date: 12 weeks - 6/10/2024  Treatment plan discussed with: Patient       Goals  Short Term Goals (4 weeks):    - Patient will improve time on TUG by 2.9 seconds to facilitate improved safety in all ambulation  - Patient will be independent in basic HEP 2-3 weeks  - Patient will improve 5xSTS score by 2.3 seconds to promote improved LE functional strength needed for ADLs      Long Term Goals (12 weeks):  - Patient will be independent in a comprehensive home exercise program  - Patient will improve scoring on DGI by 2.6 points to progress safety  - Patient will improve gait speed by 0.18 m/s to improve safety with community ambulation  - Patient will improve HYDE by 6 points in order to improve static balance and reduce risk for falls  - Patient will improve scoring on FGA by 4 points to progress safety with dynamic tasks  - Patient will be able to demonstrate HT in gait without veering  - Patient will improve 6 Minute Walk Test score by 190 feet to promote improved cardiovascular endurance  - Patient  will report 50% reduction in near falls in order to improve safety with functional tasks and reduce his risk for falls  - Patient will report going on walks at least 3 days per week to promote independence and improved cardiovascular endurance  - Patient will be able to ascend/descend stairs reciprocally with 1 UE assist to promote independence and safety with ADLs  - Patient will report 50% reduction in near falls when ambulating on uneven terrain      Cut off score    All date taken from APTA Neuro Section or Rehab Measures      Davis/56  MDC: 6 pts  Age Norms:  60-69: M - 55   F - 55  70-79: M - 54   F - 53  80-89: M - 53   F - 50 5xSTS: Tirso et al 2010  MDC: 2.3 sec  Age Norms:  60-69: 11.1 sec  70-79: 12.6 sec  80-89: 14.8 sec   TUG  MDC: 4.14 sec  Cut off score:  >13.5 sec community dwelling adults  >32.2 frail elderly  <20 I for basic transfers  >30 dependent on transfers 10 Meter Walk Test: Escobar and Tye et al 2011  MDC: 0.18 m/s  20-29: M - 1.35 m   F - 1.34 m  30-39: M - 1.43 m   F - 1.34 m  40-49: M - 1.43 m   F - 1.39 m  50-59: M - 1.43 m   F - 1.31 m  60-69: M - 1.34 m   F - 1.24 m  70-79: M - 1.26 m   F - 1.13 m  80-89: M - 0.97 m   F - 0.94 m    Household Ambulator < 0.4 m/s  Limited Community Ambulator 0.4 - 0.8 m/s  Community Ambulator 0.8 - 1.2 m/s  Safely cross the street > 1.2 m/s   FGA  MCID: 4 pts  Geriatrics/community < 22/30 fall risk  Geriatrics/community < 20/30 unexplained falls    DGI  MDC: vestibular - 4 pts  MDC: geriatric/community - 3 pts  Falls risk <19/24 mCTSIB  Norm: 20-60 yrs  Eyes open firm: norm sway 0.21-0.48  Eyes closed firm: norm sway 0.48-0.99  Eyes open foam: norm sway 0.38-0.71  Eyes closed foam: norm sway 0.70-2.22   6 Minute Walk Test  MDC: 190.98 ft  MCID: 164 ft    Age Norms  60-69: M - 1876 ft (571.80 m)  F - 1765 ft (537.98 m)  70-79: M - 1729 ft (527.00 m)  F - 1545 ft (470.92 m)  80-89: M - 1368 ft (416.97 m)  F - 1286 ft (391.97 m) ABC: Taylor Miller  Song, 2003  <67% increased risk for falls   [place] Monofilaments  Evaluator Size:        Force (grams):          Hand/Dorsal Thresholds:        Plantar Thresholds:  - 1.65                       - 0.008                       - Normal                                 - Normal  - 2.36                       - 0.02                         - Normal                                 - Normal  - 2.44                       - 0.04                         - Normal                                 - Normal  - 2.83                       - 0.07                         - Normal                                 - Normal  - 3.22                       - 0.16                         - Diminished light touch          - Normal  - 3.61                       - 0.40                         - Diminished light touch          - Normal  - 3.84                       - 0.60                         - Diminished protective           - Diminished light touch  - 4.08                       - 1.00                         - Diminished protective           - Diminished light touch  - 4.17                       - 1.40                         - Diminished protective           - Diminished light touch  - 4.31                       - 2.00                         - Diminished protective           - Diminished light touch  - 4.56                       - 4.00                         - Loss of protective sense      - Diminished protective  - 4.74                       - 6.00                         - Loss of protective sense      - Diminished protective  - 4.93                       - 8.00                         - Loss of protective sense      - Diminished protective  - 5.07                       - 10.0                         - Loss of protective sense     - Loss of protective sense  - 5.18                       - 15.0                         - Loss of protective sense     - Loss of protective sense  - 5.46                       - 26.0                "          - Loss of protective sense     - Loss of protective sense  - 5.88                       - 60.0                         - Loss of protective sense     - Loss of protective sense  - 6.10                       - 100                          - Loss of protective sense     - Loss of protective sense  - 6.45                       - 180                          - Loss of protective sense     - Loss of protective sense  - 6.65                       - 300                          - Deep pressure sense only  - Deep pressure sense only         Subjective    History of Present Illness  - Mechanism of injury: Patient reports that she has been having imbalance over the last couple months that resulted in a fall approximately 1 week ago. Patient was hospitalized and found to have toxic levels of medication which has recently been switched. She reports some weakness and continued imbalance since she has been home from the hospital.   - Primary AD: none  - Assist level at home: Independent      Pain  - Current pain ratin/10    Social Support  - Steps to enter house: \"a couple\"  - Stairs in house: none   - Lives in: modular  - Lives with:     - Employment status: retired    Treatments  - Previous treatment: PT (back, shoulder, hand)      Objective     HR: 84 bpm  SpO2: 95%  BP: 134/82    LE MMT  - R Hip Flexion: 4/5  L Hip Flexion: 4/5  - R Hip Abduction: 4/5  L Hip Abduction: 4/5  - R Hip Adduction: 4/5  L Hip Adduction: 4/5  - R Knee Extension: 4/5  L Knee Extension: 4/5  - R Knee Flexion: 4/5  L Knee Flexion: 4/5  - R Ankle DF: 4/5   L Ankle DF: 4/5  - R Ankle PF: 4/5   L Ankle PF: 4/5    Sensation  - Light touch: WNL  - Deep pressure: WNL    Coordination  - Heel to Shin: WNL  - Alternate Toe Taps: WNL  - Finger to Nose: WNL         Outcome Measures Initial Eval  3/18/24        5xSTS 13.4 sec        TUG  - Regular  - Cognitive   10.1 sec  17.9 sec        10 meter 1.19 m/s        MARY ELLEN /Ethel        FGA 15/30      "   DGI /24        mCTSIB  - FTEO (firm)  - FTEC (firm)    - FTEO (foam)  - FTEC (foam)   30 sec  30 sec + sway  30 sec  8 sec        6MWT 1275 ft                                     Precautions:   Past Medical History:   Diagnosis Date    Arthritis     Hyperparathyroidism (HCC)     Osteoporosis     Pollen allergy     Seizure disorder (HCC)

## 2024-03-19 ENCOUNTER — HOSPITAL ENCOUNTER (OUTPATIENT)
Dept: RADIOLOGY | Age: 64
Discharge: HOME | End: 2024-03-19
Payer: COMMERCIAL

## 2024-03-19 ENCOUNTER — APPOINTMENT (OUTPATIENT)
Dept: RADIOLOGY | Age: 64
End: 2024-03-19
Payer: COMMERCIAL

## 2024-03-19 DIAGNOSIS — M81.0 OSTEOPOROSIS WITHOUT CURRENT PATHOLOGICAL FRACTURE, UNSPECIFIED OSTEOPOROSIS TYPE: ICD-10-CM

## 2024-03-19 PROCEDURE — 77080 DXA BONE DENSITY AXIAL: CPT

## 2024-03-20 ENCOUNTER — OFFICE VISIT (OUTPATIENT)
Dept: PHYSICAL THERAPY | Facility: MEDICAL CENTER | Age: 64
End: 2024-03-20
Payer: COMMERCIAL
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
## 2024-03-20 DIAGNOSIS — T42.0X4D: ICD-10-CM

## 2024-03-20 DIAGNOSIS — R27.0 ATAXIA: Primary | ICD-10-CM

## 2024-03-20 PROCEDURE — 97112 NEUROMUSCULAR REEDUCATION: CPT | Performed by: PHYSICAL THERAPIST

## 2024-03-20 PROCEDURE — 97530 THERAPEUTIC ACTIVITIES: CPT | Performed by: PHYSICAL THERAPIST

## 2024-03-20 NOTE — PROGRESS NOTES
"Daily Note     POC expires Auth Status Total   Visits  Start date  Expiration date PT/OT + Visit Limit? Co-Insurance   6/10/24 [id number] 12 3/14/24 6/30/24                                             Visit/Unit Tracking  AUTH Status:  Date 3/18 3/20             Visits  Authed: 12 Used 1 2              Remaining  11 10               Today's date: 3/20/2024  Patient name: Leighann Dolan  : 1960  MRN: [id number]  Referring provider: Kaitlynn Jackman MD  Dx:   Encounter Diagnosis     ICD-10-CM    1. Ataxia  R27.0       2. Fall, subsequent encounter  W19.XXXD       3. Phenytoin poisoning of undetermined intent, subsequent encounter  T42.0X4D                      Subjective: Patient reports no changes or falls since her last session      Objective: See treatment diary below    NMR:  - STS: 10x 2 sets  - Standing marchinx  - Step taps: 20x  - Step ups: 20x  - Ant/post sway on foam: 20x  - FWD/BWD ambulation: 5 laps x 30 ft  - FWD hurdles: 8 laps x 6\" hurdles      Oculomotor/VOR:  - Smooth Pursuits: WNL  - Saccades: hypometric  - Near Point Convergence (10 cm/4\"): WNL  - VORx1: lightheadedness  - VOR Cx: dizziness 3/10  - Head Shaking Test: Increased dizziness (3/10)  - Head Thrust:   - R-L: difficulty maintaining gaze (3/10 D)   - L-R: difficulty maintaining gaze (3/10 D)      Assessment: Patient tolerated treatment well. Continued with oculomotor screening with patient reporting increased dizziness during VOR x1, VOR Cx, Head shaking and head thrust testing suggesting possible motion sensitivity and possible vestibular hypofunction. Began patient with standing balance exercises, displaying poor posterior weight shifting and balance reactions, specifically with backwards ambulation resulting in frequent LOB that required PT assist to prevent falls. Patient will benefit from skilled PT services to improve her functional strength, improve her balance, and reduce her risk of falls to maximize her level of safety " at home and in the community.       Plan: Continue per plan of care.          Outcome Measures Initial Eval  3/18/24        5xSTS 13.4 sec        TUG  - Regular  - Cognitive   10.1 sec  17.9 sec        10 meter 1.19 m/s        MARY ELLEN /Ethel        FGA 15/30        DGI /24        mCTSIB  - FTEO (firm)  - FTEC (firm)    - FTEO (foam)  - FTEC (foam)   30 sec  30 sec + sway  30 sec  8 sec        6MWT 1275 ft

## 2024-03-21 ENCOUNTER — TELEPHONE (OUTPATIENT)
Dept: NEUROLOGY | Facility: CLINIC | Age: 64
End: 2024-03-21

## 2024-03-21 NOTE — TELEPHONE ENCOUNTER
HFU/ SLAN/ Phenytoin toxicity      DC- Home- 3/12/24      Notes:  Leighann Dolan will need follow up in in 6 weeks with neurovascular attending. She will not require outpatient neurological testing.       Scheduled:  5/23/24 Vilma 1pm ALL  Pt is added to waitlist

## 2024-03-25 ENCOUNTER — OFFICE VISIT (OUTPATIENT)
Dept: PHYSICAL THERAPY | Facility: MEDICAL CENTER | Age: 64
End: 2024-03-25
Payer: COMMERCIAL
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
## 2024-03-25 DIAGNOSIS — R27.0 ATAXIA: Primary | ICD-10-CM

## 2024-03-25 DIAGNOSIS — T42.0X4D: ICD-10-CM

## 2024-03-25 PROCEDURE — 97112 NEUROMUSCULAR REEDUCATION: CPT | Performed by: PHYSICAL THERAPIST

## 2024-03-25 NOTE — PROGRESS NOTES
"Daily Note     POC expires Auth Status Total   Visits  Start date  Expiration date PT/OT + Visit Limit? Co-Insurance   6/10/24 [id number] 12 3/14/24 6/30/24                                             Visit/Unit Tracking  AUTH Status:  Date 3/18 3/20             Visits  Authed: 12 Used 1 2              Remaining  11 10               Today's date: 3/25/2024  Patient name: Leighann Dolan  : 1960  MRN: [id number]  Referring provider: Kaitlynn Jackman MD  Dx:   Encounter Diagnosis     ICD-10-CM    1. Ataxia  R27.0       2. Fall, subsequent encounter  W19.XXXD       3. Phenytoin poisoning of undetermined intent, subsequent encounter  T42.0X4D                      Subjective: Patient reports 1 instance where she rolled her ankle and did not fall, but slowly lowered herself to the ground      Objective: See treatment diary below    NMR:  - STS: 10x 2 sets  - Step taps: 20x  - Step ups: 20x  - Ant/post sway on firm: 20x  - Standing heel/toe raises: 20x  - FWD hurdles: 10 laps x 9\" raul  - LAT hurdles: 10 laps x 9\" hurdles    VOR x1 (30 sec, self-paced)  - H: 3/10 D (1 LOB)  - V: 0/10 D  - H: 3/10 D  - V: 4/10 D    VOR Cx (30 sec, self-paced)  - H: 1/10 D (1 LOB)  - V: 1/10 D (1 LOB)  - H: /10 D   - V: 1/10 D (1 LOB)        Assessment: Patient tolerated treatment well. Began patient with VOR exercises with patient reporting minor increases in dizziness but demonstrated postural sway with posterior LOB. When standing and navigating obstacles patient displays poor posterior postural control and balance reactions requiring PT assist multiple times to prevent possible falls. Plan to add balance reaction exercises next session. Patient will benefit from skilled PT services to improve her functional strength, improve her balance, and reduce her risk of falls to maximize her level of safety at home and in the community.       Plan: Continue per plan of care.          Outcome Measures Initial Eval  3/18/24        5xSTS 13.4 " sec        TUG  - Regular  - Cognitive   10.1 sec  17.9 sec        10 meter 1.19 m/s        MARY ELLEN /Ethel        A 15/30        DGI /24        mCTSIB  - FTEO (firm)  - FTEC (firm)    - FTEO (foam)  - FTEC (foam)   30 sec  30 sec + sway  30 sec  8 sec        6MWT 1275 ft

## 2024-03-26 ENCOUNTER — TELEPHONE (OUTPATIENT)
Dept: OTHER | Facility: HOSPITAL | Age: 64
End: 2024-03-26

## 2024-03-26 DIAGNOSIS — E55.9 VITAMIN D DEFICIENCY: Primary | ICD-10-CM

## 2024-03-26 NOTE — TELEPHONE ENCOUNTER
Team,   Please start paperwork for Evenity, once  it is approved , patient will need appointment in May for Evenity injection    Scott Loya

## 2024-03-26 NOTE — TELEPHONE ENCOUNTER
Reviewed DEXA scan report, bone density has worsened at lumbar spine and hip.  Patient would benefit from starting Evenity injection once a month for 12 months.  Discussed with patient option about Evenity, she is agreeable  No history of cardiovascular events in the last 6 months    Patient was also found to have low vitamin D, 10, discussed to start taking vitamin D3 supplementation 5000 IU daily  Will repeat vitamin D in 1 month

## 2024-03-27 ENCOUNTER — OFFICE VISIT (OUTPATIENT)
Dept: PHYSICAL THERAPY | Facility: MEDICAL CENTER | Age: 64
End: 2024-03-27
Payer: COMMERCIAL
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
## 2024-03-27 DIAGNOSIS — R27.0 ATAXIA: Primary | ICD-10-CM

## 2024-03-27 DIAGNOSIS — T42.0X4D: ICD-10-CM

## 2024-03-27 PROCEDURE — 97112 NEUROMUSCULAR REEDUCATION: CPT | Performed by: PHYSICAL THERAPIST

## 2024-03-27 NOTE — PROGRESS NOTES
Daily Note     POC expires Auth Status Total   Visits  Start date  Expiration date PT/OT + Visit Limit? Co-Insurance   6/10/24 [id number] 12 3/14/24 6/30/24                                             Visit/Unit Tracking  AUTH Status:  Date 3/18 3/20             Visits  Authed: 12 Used 1 2              Remaining  11 10               Today's date: 3/27/2024  Patient name: Leighann Dolan  : 1960  MRN: [id number]  Referring provider: Kaitlynn Jackman MD  Dx:   Encounter Diagnosis     ICD-10-CM    1. Ataxia  R27.0       2. Fall, subsequent encounter  W19.XXXD       3. Phenytoin poisoning of undetermined intent, subsequent encounter  T42.0X4D                      Subjective: Patient reports no new falls since her last treatment      Objective: See treatment diary below    NMR: (4# post. weighted vest)  - STS: 10x 2 sets  - Step taps: 20x  - Step ups: 20x  - marching on foam: 20x 2 sets  - standing heel/toe raises: 20x  - Standing cone taps: 20x  Agility ladder (2 laps ea)  -- double leg FWD  -- double leg LAT  -- double leg BWD      VOR x1 (30 sec, self-paced)  - H: /10 D   - V: 0/10 D  - H: 1/10 D  - V: 3/10 D (2 LOB)      HEP: STS, Standing marching, heel/toe raises      Assessment: Patient tolerated treatment well. Continued with VOR exercises with patient reporting reduced dizziness until final rep which had 2 posterior LOB. Due to frequent posterior LOB, posteriorly weighted vest added today to encourage anterior weight shifting. LE coordination also challenged with agility ladder in FWD/LAT/BWD directions and completing with approximately 75% accuracy. Patient will benefit from skilled PT services to improve her functional strength, improve her balance, and reduce her risk of falls to maximize her level of safety at home and in the community.       Plan: Continue per plan of care.          Outcome Measures Initial Eval  3/18/24        5xSTS 13.4 sec        TUG  - Regular  - Cognitive   10.1 sec  17.9 sec         10 meter 1.19 m/s        MARY ELLEN /Ethel        A 15/30        DGI /24        mCTSIB  - FTEO (firm)  - FTEC (firm)    - FTEO (foam)  - FTEC (foam)   30 sec  30 sec + sway  30 sec  8 sec        6MWT 1275 ft

## 2024-04-01 ENCOUNTER — OFFICE VISIT (OUTPATIENT)
Dept: PHYSICAL THERAPY | Facility: MEDICAL CENTER | Age: 64
End: 2024-04-01
Payer: COMMERCIAL

## 2024-04-01 DIAGNOSIS — R27.0 ATAXIA: Primary | ICD-10-CM
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
## 2024-04-01 DIAGNOSIS — T42.0X4D: ICD-10-CM

## 2024-04-01 PROCEDURE — 97112 NEUROMUSCULAR REEDUCATION: CPT | Performed by: PHYSICAL THERAPIST

## 2024-04-01 NOTE — PROGRESS NOTES
Daily Note     POC expires Auth Status Total   Visits  Start date  Expiration date PT/OT + Visit Limit? Co-Insurance   6/10/24 [id number] 12 3/14/24 6/30/24                                             Visit/Unit Tracking  AUTH Status:  Date 3/18 3/20 3/25 3/27 4/1          Visits  Authed: 12 Used 1 2 3 4 5           Remaining  11 10 9 8 7            Today's date: 2024  Patient name: Leighann Dolan  : 1960  MRN: [id number]  Referring provider: Kaitlynn Jackman MD  Dx:   Encounter Diagnosis     ICD-10-CM    1. Ataxia  R27.0       2. Fall, subsequent encounter  W19.XXXD       3. Phenytoin poisoning of undetermined intent, subsequent encounter  T42.0X4D                      Subjective: Patient reports no new falls since her last treatment      Objective: See treatment diary below    NMR: (4# post. weighted vest, 2# ankle weights)  - STS: 10x 3 sets  - FWD Step taps: 20x on river rocks  - LAT Step taps: 20x on river rocks  - Sidestepping on foam beam: 10 laps   - FWD/BWD step over foam beam: 20x  - LAT step over foam beam: 20x  - Ambulation w/ card shuffle: 5 laps x 40 ft  - Marching w/ card shuffle: 5 laps x 40 ft  - Pick/pull squigz from wall: 15x 2 sets        HEP: STS, Standing marching, heel/toe raises      Assessment: Patient tolerated treatment well. Ankle weights added to increase proprioception and feedback through B/L LE. Posterior weighted vest also continued today with patient display reduced posterior LOB. Dual tasking of card shuffling added today during ambulation and marching with patient displaying poor step placement and increased lateral LOB. Plan to continue with dual tasking exercises. Patient will benefit from skilled PT services to improve her functional strength, improve her balance, and reduce her risk of falls to maximize her level of safety at home and in the community.       Plan: Continue per plan of care.          Outcome Measures Initial Eval  3/18/24        5xSTS 13.4 sec         TUG  - Regular  - Cognitive   10.1 sec  17.9 sec        10 meter 1.19 m/s        MARY ELLEN /Ethel        A 15/30        DGI /24        mCTSIB  - FTEO (firm)  - FTEC (firm)    - FTEO (foam)  - FTEC (foam)   30 sec  30 sec + sway  30 sec  8 sec        6MWT 1275 ft

## 2024-04-03 ENCOUNTER — OFFICE VISIT (OUTPATIENT)
Dept: PHYSICAL THERAPY | Facility: MEDICAL CENTER | Age: 64
End: 2024-04-03
Payer: COMMERCIAL
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
## 2024-04-03 DIAGNOSIS — T42.0X4D: ICD-10-CM

## 2024-04-03 DIAGNOSIS — R27.0 ATAXIA: Primary | ICD-10-CM

## 2024-04-03 PROCEDURE — 97112 NEUROMUSCULAR REEDUCATION: CPT | Performed by: PHYSICAL THERAPIST

## 2024-04-03 NOTE — PROGRESS NOTES
"Daily Note     POC expires Auth Status Total   Visits  Start date  Expiration date PT/OT + Visit Limit? Co-Insurance   6/10/24 [id number] 12 3/14/24 6/30/24                                             Visit/Unit Tracking  AUTH Status:  Date 3/18 3/20 3/25 3/27 4/1 4/3         Visits  Authed: 12 Used 1 2 3 4 5 6          Remaining  11 10 9 8 7 6           Today's date: 4/3/2024  Patient name: Leighann Dolan  : 1960  MRN: [id number]  Referring provider: Kaitlynn Jackman MD  Dx:   Encounter Diagnosis     ICD-10-CM    1. Ataxia  R27.0       2. Fall, subsequent encounter  W19.XXXD       3. Phenytoin poisoning of undetermined intent, subsequent encounter  T42.0X4D                      Subjective: Patient reports no new falls since her last treatment      Objective: See treatment diary below    NMR: (4# post. weighted vest)  - STS: 10x 3 sets  - FWD Step ups: 20x on river rocks  - Cone taps: 20x ea  - FWD hurdles: 10 laps x 6\" hurdles  - LAT hurdles: 10 laps x 6\" hurdles  - BWD hurdles: 10 laps x 6\" hurdles  - Sled push/pull: 8 laps x 20 ft  - Pick/pull squigz from wall: 15x 2 sets        HEP: STS, Standing marching, heel/toe raises      Assessment: Patient tolerated treatment well. BWD hurdles added today with patient continuing to display poor posterior postural and balance control resulting in LOB that required PT assist. When picking and placing squigz on wall patient displays occasional posterior LOB with 180 degree turn but is able to self correct with stepping strategy. Patient will benefit from skilled PT services to improve her functional strength, improve her balance, and reduce her risk of falls to maximize her level of safety at home and in the community.       Plan: Continue per plan of care.          Outcome Measures Initial Eval  3/18/24        5xSTS 13.4 sec        TUG  - Regular  - Cognitive   10.1 sec  17.9 sec        10 meter 1.19 m/s        MARY ELLEN /Ethel        FGA 15/30        DGI /24        mCTSIB  - " FTEO (firm)  - FTEC (firm)    - FTEO (foam)  - FTEC (foam)   30 sec  30 sec + sway  30 sec  8 sec        6MWT 1275 ft

## 2024-04-05 NOTE — TELEPHONE ENCOUNTER
Call placed to patient and scheduled her Evenity Injection for 4/9/24 @ 10:00 at the [facility].

## 2024-04-08 ENCOUNTER — OFFICE VISIT (OUTPATIENT)
Dept: PHYSICAL THERAPY | Facility: MEDICAL CENTER | Age: 64
End: 2024-04-08
Payer: COMMERCIAL

## 2024-04-08 ENCOUNTER — TELEPHONE (OUTPATIENT)
Dept: ENDOCRINOLOGY | Facility: CLINIC | Age: 64
End: 2024-04-08
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
## 2024-04-08 DIAGNOSIS — T42.0X4D: ICD-10-CM

## 2024-04-08 DIAGNOSIS — R27.0 ATAXIA: Primary | ICD-10-CM

## 2024-04-08 PROCEDURE — 97110 THERAPEUTIC EXERCISES: CPT | Performed by: PHYSICAL THERAPIST

## 2024-04-08 PROCEDURE — 97112 NEUROMUSCULAR REEDUCATION: CPT | Performed by: PHYSICAL THERAPIST

## 2024-04-08 NOTE — PROGRESS NOTES
"Daily Note     POC expires Auth Status Total   Visits  Start date  Expiration date PT/OT + Visit Limit? Co-Insurance   6/10/24 [id number] 12 3/14/24 6/30/24                                             Visit/Unit Tracking  AUTH Status:  Date 3/18 3/20 3/25 3/27 4/1 4/3 4/8        Visits  Authed: 12 Used 1 2 3 4 5 6 7         Remaining  11 10 9 8 7 6 5          Today's date: 2024  Patient name: Leighann Dolan  : 1960  MRN: [id number]  Referring provider: Kaitlynn Jackman MD  Dx:   Encounter Diagnosis     ICD-10-CM    1. Ataxia  R27.0       2. Fall, subsequent encounter  W19.XXXD       3. Phenytoin poisoning of undetermined intent, subsequent encounter  T42.0X4D                      Subjective: Patient reports no new falls since her last treatment      Objective: See treatment diary below    TA:  - R ankle visual inspection/palpation/ROM    NMR/TE: (8# post. weighted vest)  - STS: 10x 3 sets  - 4-way ankle TB: 15x ea, YTB  - FWD step taps: 20x 6\" step  - FWD step ups: 20x 6\" step  - LAT step ups: 20x 6\" step  - Sidestepping on foam beam: 10 laps   - Ant/post sway on foam pad: 10x ea  - FTEO/EC on foam: 30 sec, 3x ea      HEP: STS, Standing marching, heel/toe raises      Assessment: Patient tolerated treatment well. Per patient complaints of instability and occasional pain, visual inspection and palpation completed with patient reporting increased pain at end-ROM dorsiflexion/plantarflexion. Mild swelling observed around lateral malleolus however patient reports that is her baseline, TB exercises completed and added to HEP to improve her stability and reduce her pain level. Increased patient to 8# with weighted vest, displaying reduced ability to recover without PT assist with posterior LOB, especially on unstable foam surface. Patient will benefit from skilled PT services to improve her functional strength, improve her balance, and reduce her risk of falls to maximize her level of safety at home and in the " community.       Plan: Continue per plan of care.          Outcome Measures Initial Eval  3/18/24        5xSTS 13.4 sec        TUG  - Regular  - Cognitive   10.1 sec  17.9 sec        10 meter 1.19 m/s        HYDE /Ethel        FGA 15/30        DGI /24        mCTSIB  - FTEO (firm)  - FTEC (firm)    - FTEO (foam)  - FTEC (foam)   30 sec  30 sec + sway  30 sec  8 sec        6MWT 1275 ft

## 2024-04-08 NOTE — TELEPHONE ENCOUNTER
Call placed to patient and left her a voicemail message to inform her that her appointment time for her Evenity injection needs to be changed to 10:30 so that there is a doctor in the office at the time of her injection.

## 2024-04-09 ENCOUNTER — CLINICAL SUPPORT (OUTPATIENT)
Dept: ENDOCRINOLOGY | Facility: CLINIC | Age: 64
End: 2024-04-09
Payer: COMMERCIAL

## 2024-04-09 VITALS — DIASTOLIC BLOOD PRESSURE: 70 MMHG | SYSTOLIC BLOOD PRESSURE: 114 MMHG | OXYGEN SATURATION: 98 % | HEART RATE: 61 BPM

## 2024-04-09 DIAGNOSIS — M81.0 OSTEOPOROSIS WITHOUT CURRENT PATHOLOGICAL FRACTURE, UNSPECIFIED OSTEOPOROSIS TYPE: Primary | ICD-10-CM

## 2024-04-09 PROCEDURE — 96372 THER/PROPH/DIAG INJ SC/IM: CPT

## 2024-04-09 NOTE — PROGRESS NOTES
Assessment/Plan:    Leighann Dolan came into the [facility] today 04/09/24 to receive Evenity injection.      Verbal consent obtained.  Consent given by: patient    patient states patient has been medically healthy with no underlining concerns/complications.      Leighann Dolan presents with no symptoms today.       All insturctions were reviewed with the patient.    If the patient should have any questions/concerns, advised patient to contacted [facility].       Subjective:     History provided by: patient    Patient ID: Leighann Dolan is a 64 y.o. female      Objective:    Vitals:    04/09/24 1112   BP: 114/70   Pulse: 61   SpO2: 98%       Patient tolerated the injection well without any complications.  Injection site/s left and right arm.  Medication was provided by office.    Patient signed consent form yes   Patient signed ABN form yes (If no patient is not a medicare patient).   Patient waited 15 minutes after injection no (This only applies to patient's receiving first time injection).       Last Visit: 4/8/2024  Next visit:5/13/2024

## 2024-04-10 ENCOUNTER — OFFICE VISIT (OUTPATIENT)
Dept: PHYSICAL THERAPY | Facility: MEDICAL CENTER | Age: 64
End: 2024-04-10
Payer: COMMERCIAL
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
## 2024-04-10 DIAGNOSIS — T42.0X4D: ICD-10-CM

## 2024-04-10 DIAGNOSIS — R27.0 ATAXIA: Primary | ICD-10-CM

## 2024-04-10 PROCEDURE — 97112 NEUROMUSCULAR REEDUCATION: CPT | Performed by: PHYSICAL THERAPIST

## 2024-04-10 NOTE — PROGRESS NOTES
"Daily Note     POC expires Auth Status Total   Visits  Start date  Expiration date PT/OT + Visit Limit? Co-Insurance   6/10/24 [id number] 12 3/14/24 6/30/24                                             Visit/Unit Tracking  AUTH Status:  Date 3/18 3/20 3/25 3/27 4/1 4/3 4/8 4/10       Visits  Authed: 12 Used 1 2 3 4 5 6 7 8        Remaining  11 10 9 8 7 6 5 4         Today's date: 4/10/2024  Patient name: Leighann Dolan  : 1960  MRN: [id number]  Referring provider: Bay Urbina DO  Dx:   Encounter Diagnosis     ICD-10-CM    1. Ataxia  R27.0       2. Fall, subsequent encounter  W19.XXXD       3. Phenytoin poisoning of undetermined intent, subsequent encounter  T42.0X4D                      Subjective: Patient reports no new falls since her last treatment      Objective: See treatment diary below      NMR/TE:   - STS: 10x 3 sets  - FWD step taps: 20x 6\" step  - FWD step ups: 20x 6\" step  - LAT step ups: 20x 6\" step  - Sidestepping on foam beam: 10 laps   - Sidestepping on foam beam w/ cone taps: 10 laps   - HT/HN of foam beam: 20x ea  - FTEO/EC on foam beam: 30 sec, 3x ea        HEP: STS, Standing marching, heel/toe raises      Assessment: Patient tolerated treatment well. Held weighted vest today with patient displaying reduced instances of posterior LOB but did require occasional PT assist. Patient was most challenged with FTEC on foam displaying anterior/posterior sway and LOB displaying increased reliance on visual input to maintain balance. Patient will benefit from skilled PT services to improve her functional strength, improve her balance, and reduce her risk of falls to maximize her level of safety at home and in the community.       Plan: Continue per plan of care.          Outcome Measures Initial Eval  3/18/24        5xSTS 13.4 sec        TUG  - Regular  - Cognitive   10.1 sec  17.9 sec        10 meter 1.19 m/s        MARY ELLEN /Ethel        FGA 15/30        DGI /24        mCTSIB  - FTEO (firm)  - FTEC " (firm)    - FTEO (foam)  - FTEC (foam)   30 sec  30 sec + sway  30 sec  8 sec        6MWT 1275 ft

## 2024-04-15 ENCOUNTER — OFFICE VISIT (OUTPATIENT)
Dept: PHYSICAL THERAPY | Facility: MEDICAL CENTER | Age: 64
End: 2024-04-15
Payer: COMMERCIAL

## 2024-04-15 DIAGNOSIS — R27.0 ATAXIA: Primary | ICD-10-CM

## 2024-04-15 DIAGNOSIS — T42.0X4D: ICD-10-CM
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
## 2024-04-15 PROCEDURE — 97112 NEUROMUSCULAR REEDUCATION: CPT | Performed by: PHYSICAL THERAPIST

## 2024-04-15 NOTE — PROGRESS NOTES
"Daily Note     POC expires Auth Status Total   Visits  Start date  Expiration date PT/OT + Visit Limit? Co-Insurance   6/10/24 [id number] 12 3/14/24 6/30/24                                             Visit/Unit Tracking  AUTH Status:  Date 3/18 3/20 3/25 3/27 4/1 4/3 4/8 4/10 4/15      Visits  Authed: 12 Used 1 2 3 4 5 6 7 8 9       Remaining  11 10 9 8 7 6 5 4 3        Today's date: 4/15/2024  Patient name: Leighann Dolan  : 1960  MRN: [id number]  Referring provider: Bay Urbina DO  Dx:   Encounter Diagnosis     ICD-10-CM    1. Ataxia  R27.0       2. Fall, subsequent encounter  W19.XXXD       3. Phenytoin poisoning of undetermined intent, subsequent encounter  T42.0X4D                      Subjective: Patient reports no new falls since her last treatment      Objective: See treatment diary below      NMR/TE: (3# ankle weights)  - STS: 15x 3 sets  - FWD step taps: 20x 6\" step  - LAT step taps: 20x 6\" step  - FWD step ups: 20x 6\" step  - LAT step ups: 20x 6\" step  - Sidestepping on foam beam: 10 laps   - FTEO/EC on foam: 3x 30 sec  - Place/pull squigz on wall: 15x 3 sets, feet on foam  - Step up on river rocks while naming alphabetic grocery list: 1 set  - Pickup/place cones on ground: 12x 2 sets        HEP: STS, Standing marching, heel/toe raises      Assessment: Patient tolerated treatment fairly. Patient continues to display poor posterior balance control, requiring cueing and PT assist to prevent posterior fall with LOB. When on unstable surface patient displays instability with fair use of ankle strategy. When returning to chair for seated rest break patient did have 1 fall where her left ankle buckled resulting in fall and landing on her hands and knees. Patient reported no pain from the fall or throughout the rest of treatment. Patient will benefit from skilled PT services to improve her functional strength, improve her balance, and reduce her risk of falls to maximize her level of safety at " home and in the community.       Plan: Continue per plan of care.          Outcome Measures Initial Eval  3/18/24        5xSTS 13.4 sec        TUG  - Regular  - Cognitive   10.1 sec  17.9 sec        10 meter 1.19 m/s        MARY ELLEN /Ethel        FGA 15/30        DGI /24        mCTSIB  - FTEO (firm)  - FTEC (firm)    - FTEO (foam)  - FTEC (foam)   30 sec  30 sec + sway  30 sec  8 sec        6MWT 1275 ft

## 2024-04-17 ENCOUNTER — OFFICE VISIT (OUTPATIENT)
Dept: PHYSICAL THERAPY | Facility: MEDICAL CENTER | Age: 64
End: 2024-04-17
Payer: COMMERCIAL

## 2024-04-17 DIAGNOSIS — R27.0 ATAXIA: Primary | ICD-10-CM
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
## 2024-04-17 DIAGNOSIS — T42.0X4D: ICD-10-CM

## 2024-04-17 PROCEDURE — 97110 THERAPEUTIC EXERCISES: CPT | Performed by: PHYSICAL THERAPIST

## 2024-04-17 NOTE — PROGRESS NOTES
Daily Note     POC expires Auth Status Total   Visits  Start date  Expiration date PT/OT + Visit Limit? Co-Insurance   6/10/24 [id number] 12 3/14/24 6/30/24                                             Visit/Unit Tracking  AUTH Status:  Date 3/18 3/20 3/25 3/27 4/1 4/3 4/8 4/10 4/15 4/17     Visits  Authed: 12 Used 1 2 3 4 5 6 7 8 9 10      Remaining  11 10 9 8 7 6 5 4 3 2       Today's date: 2024  Patient name: Leighann Dolan  : 1960  MRN: [id number]  Referring provider: Bay Urbina DO  Dx:   Encounter Diagnosis     ICD-10-CM    1. Ataxia  R27.0       2. Fall, subsequent encounter  W19.XXXD       3. Phenytoin poisoning of undetermined intent, subsequent encounter  T42.0X4D                      Subjective: Patient reports she might have broken her toe on the couch      Objective: See treatment diary below      TE:   - STS: 15x 3 sets  - Seated clamshells: 20x 3 sec holds, RTB  - Seated marching 20x 2 sets RTB  - Seated ball squeeze: 20x 3 sec holds  - LAQ: 20x 3 sec holds, 2 sets          HEP: STS, Standing marching, heel/toe raises      Assessment: Patient tolerated treatment well. Due to increased foot pain from stubbing her toe, held standing exercises and completed seated LE strengthening exercises. Red TB used to increased resistance, reporting no increased pain during treatment. Encouraged patient to seek higher medical attention if her symptoms change, patient verbalized agreement. Patient will benefit from skilled PT services to improve her functional strength, improve her balance, and reduce her risk of falls to maximize her level of safety at home and in the community.       Plan: Continue per plan of care.          Outcome Measures Initial Eval  3/18/24        5xSTS 13.4 sec        TUG  - Regular  - Cognitive   10.1 sec  17.9 sec        10 meter 1.19 m/s        HYDE /Ethel        FGA 15/30        DGI /24        mCTSIB  - FTEO (firm)  - FTEC (firm)    - FTEO (foam)  - FTEC (foam)   30  sec  30 sec + sway  30 sec  8 sec        6MWT 1275 ft

## 2024-04-22 ENCOUNTER — OFFICE VISIT (OUTPATIENT)
Dept: PHYSICAL THERAPY | Facility: MEDICAL CENTER | Age: 64
End: 2024-04-22
Payer: COMMERCIAL
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
## 2024-04-22 DIAGNOSIS — R27.0 ATAXIA: Primary | ICD-10-CM

## 2024-04-22 DIAGNOSIS — T42.0X4D: ICD-10-CM

## 2024-04-22 PROCEDURE — 97112 NEUROMUSCULAR REEDUCATION: CPT | Performed by: PHYSICAL THERAPIST

## 2024-04-22 PROCEDURE — 97530 THERAPEUTIC ACTIVITIES: CPT | Performed by: PHYSICAL THERAPIST

## 2024-04-22 PROCEDURE — 97110 THERAPEUTIC EXERCISES: CPT | Performed by: PHYSICAL THERAPIST

## 2024-04-22 NOTE — PROGRESS NOTES
POC expires Auth Status Total   Visits  Start date  Expiration date PT/OT + Visit Limit? Co-Insurance   6/10/24 [id number] 12 3/14/24 6/30/24                                             Visit/Unit Tracking  AUTH Status:  Date 3/18 3/20 3/25 3/27 4/1 4/3 4/8 4/10 4/15 4/17 4/22    Visits  Authed: 12 Used 1 2 3 4 5 6 7 8 9 10 11     Remaining  11 10 9 8 7 6 5 4 3 2 1               Today's date: 2024  Patient name: Leighann Dolan  : 1960  MRN: [id number]  Referring provider: Bay Urbina DO  Dx:   Encounter Diagnosis     ICD-10-CM    1. Ataxia  R27.0       2. Fall, subsequent encounter  W19.XXXD       3. Phenytoin poisoning of undetermined intent, subsequent encounter  T42.0X4D             Assessment  Assessment details: Patient is a 64 y.o. Female who has been attending skilled outpatient PT with complaints of balance deficits resulting in falls. Patient reports that since starting her treatment her balance has been improving but is not as good as it can be. Over the last month patient reports 2 total falls. Since her initial evaluation, patient displays significant improvements with her 5xSTS, FGA, TUG COG, and mCTSIB. Improvements with these objective measures suggest improvements with her functional strength, static balance, and dynamic balance. However despite these improvements patient remains below normative values for FTEC on foam condition of mCTSIB and FGA, suggesting she is at HIGH risk of falls. Patient also displays improvements with her 10 MWT, 6 MWT, and TUG however these improvements are not enough to reflect MDC. Based on her improvements thus far and continued HIGH risk of falls, patient will continued to benefit from skilled PT services to improve her functional strength, improve her balance, and reduce her risk of falls to maximize her level of safety at home and in the community.         Impairments:  Activity intolerance, Impaired balance, Impaired physical strength, Lacks appropriate HEP, Safety issue, Abnormal movement  Understanding of Dx/Px/POC: Good  Prognosis: Good    Patient verbalized understanding of POC.         Please contact me if you have any questions or recommendations. Thank you for the referral and the opportunity to share in Leighann Dolan's care.        Plan  Plan details:   Patient would benefit from: Skilled PT  Planned modality interventions: Cryotherapy, Thermotherapy  Planned therapy interventions: Balance, Balance/WB training, Body mechanics training, Coordination, Gait training, HEP, Manual Therapy, Motor coordination training, Neuromuscular re-education, Patient education, Postural training, Strengthening, Stretching, Therapeutic activities, Therapeutic exercises, Therapeutic training  Frequency: 2x/wk  Duration in weeks: 12  Plan of Care beginning date: 3/18/2024  Plan of Care expiration date: 12 weeks - 6/10/2024  Treatment plan discussed with: Patient       Goals  Short Term Goals (4 weeks):    - Patient will improve time on TUG by 2.9 seconds to facilitate improved safety in all ambulation- Progressing  - Patient will be independent in basic HEP 2-3 weeks- MET  - Patient will improve 5xSTS score by 2.3 seconds to promote improved LE functional strength needed for ADLs- MET      Long Term Goals (12 weeks):  - Patient will be independent in a comprehensive home exercise program  - Patient will improve scoring on DGI by 2.6 points to progress safety  - Patient will improve gait speed by 0.18 m/s to improve safety with community ambulation  - Patient will improve HYDE by 6 points in order to improve static balance and reduce risk for falls  - Patient will improve scoring on FGA by 4 points to progress safety with dynamic tasks  - Patient will be able to demonstrate HT in gait without veering  - Patient will improve 6 Minute Walk Test score by 190 feet to promote improved  cardiovascular endurance  - Patient will report 50% reduction in near falls in order to improve safety with functional tasks and reduce his risk for falls  - Patient will report going on walks at least 3 days per week to promote independence and improved cardiovascular endurance  - Patient will be able to ascend/descend stairs reciprocally with 1 UE assist to promote independence and safety with ADLs  - Patient will report 50% reduction in near falls when ambulating on uneven terrain      Cut off score    All date taken from APTA Neuro Section or Rehab Measures      Davis/56  MDC: 6 pts  Age Norms:  60-69: M - 55   F - 55  70-79: M - 54   F - 53  80-89: M - 53   F - 50 5xSTS: Tirso et al 2010  MDC: 2.3 sec  Age Norms:  60-69: 11.1 sec  70-79: 12.6 sec  80-89: 14.8 sec   TUG  MDC: 4.14 sec  Cut off score:  >13.5 sec community dwelling adults  >32.2 frail elderly  <20 I for basic transfers  >30 dependent on transfers 10 Meter Walk Test: Felice et al 2011  MDC: 0.18 m/s  20-29: M - 1.35 m   F - 1.34 m  30-39: M - 1.43 m   F - 1.34 m  40-49: M - 1.43 m   F - 1.39 m  50-59: M - 1.43 m   F - 1.31 m  60-69: M - 1.34 m   F - 1.24 m  70-79: M - 1.26 m   F - 1.13 m  80-89: M - 0.97 m   F - 0.94 m    Household Ambulator < 0.4 m/s  Limited Community Ambulator 0.4 - 0.8 m/s  Community Ambulator 0.8 - 1.2 m/s  Safely cross the street > 1.2 m/s   FGA  MCID: 4 pts  Geriatrics/community < 22/30 fall risk  Geriatrics/community < 20/30 unexplained falls    DGI  MDC: vestibular - 4 pts  MDC: geriatric/community - 3 pts  Falls risk <19/24 mCTSIB  Norm: 20-60 yrs  Eyes open firm: norm sway 0.21-0.48  Eyes closed firm: norm sway 0.48-0.99  Eyes open foam: norm sway 0.38-0.71  Eyes closed foam: norm sway 0.70-2.22   6 Minute Walk Test  MDC: 190.98 ft  MCID: 164 ft    Age Norms  60-69: M - 1876 ft (571.80 m)  F - 1765 ft (537.98 m)  70-79: M - 1729 ft (527.00 m)  F - 1545 ft (470.92 m)  80-89: M - 1368 ft (416.97 m)  F - 1286  ft (391.97 m) ABC: [employer], 2003  <67% increased risk for falls   [place] Monofilaments  Evaluator Size:        Force (grams):          Hand/Dorsal Thresholds:        Plantar Thresholds:  - 1.65                       - 0.008                       - Normal                                 - Normal  - 2.36                       - 0.02                         - Normal                                 - Normal  - 2.44                       - 0.04                         - Normal                                 - Normal  - 2.83                       - 0.07                         - Normal                                 - Normal  - 3.22                       - 0.16                         - Diminished light touch          - Normal  - 3.61                       - 0.40                         - Diminished light touch          - Normal  - 3.84                       - 0.60                         - Diminished protective           - Diminished light touch  - 4.08                       - 1.00                         - Diminished protective           - Diminished light touch  - 4.17                       - 1.40                         - Diminished protective           - Diminished light touch  - 4.31                       - 2.00                         - Diminished protective           - Diminished light touch  - 4.56                       - 4.00                         - Loss of protective sense      - Diminished protective  - 4.74                       - 6.00                         - Loss of protective sense      - Diminished protective  - 4.93                       - 8.00                         - Loss of protective sense      - Diminished protective  - 5.07                       - 10.0                         - Loss of protective sense     - Loss of protective sense  - 5.18                       - 15.0                         - Loss of protective sense     - Loss of protective sense  - 5.46                 "       - 26.0                         - Loss of protective sense     - Loss of protective sense  - 5.88                       - 60.0                         - Loss of protective sense     - Loss of protective sense  - 6.10                       - 100                          - Loss of protective sense     - Loss of protective sense  - 6.45                       - 180                          - Loss of protective sense     - Loss of protective sense  - 6.65                       - 300                          - Deep pressure sense only  - Deep pressure sense only         Subjective    History of Present Illness  - Mechanism of injury: Patient reports that she has been having imbalance over the last couple months that resulted in a fall approximately 1 week ago. Patient was hospitalized and found to have toxic levels of medication which has recently been switched. She reports some weakness and continued imbalance since she has been home from the hospital.     - Primary AD: none  - Assist level at home: Independent      Pain  - Current pain ratin/10    Social Support  - Steps to enter house: \"a couple\"  - Stairs in house: none   - Lives in: modular  - Lives with:     - Employment status: retired    Treatments  - Previous treatment: PT (back, shoulder, hand)      Objective     LE MMT  - R Hip Flexion: 4/5  L Hip Flexion: 4/5  - R Hip Abduction: 4/5  L Hip Abduction: 4/5  - R Hip Adduction: 4/5  L Hip Adduction: 4/5  - R Knee Extension: 4/5  L Knee Extension: 4/5  - R Knee Flexion: 4/5  L Knee Flexion: 4/5  - R Ankle DF: 4/5   L Ankle DF: 4/5  - R Ankle PF: 4/5   L Ankle PF: 4/5    Sensation  - Light touch: WNL  - Deep pressure: WNL    Coordination  - Heel to Shin: WNL  - Alternate Toe Taps: WNL  - Finger to Nose: WNL         Outcome Measures Initial Eval  3/18/24 PN  24       5xSTS 13.4 sec 10.2 sec       TUG  - Regular  - Cognitive   10.1 sec  17.9 sec   8.9 sec  10.1 sec       10 meter 1.19 m/s 1.23 " "m/s       HYDE /56        FGA 15/30 2030       DGI /24        mCTSIB  - FTEO (firm)  - FTEC (firm)    - FTEO (foam)  - FTEC (foam)   30 sec  30 sec + sway  30 sec  8 sec   30 sec  30 sec + sway  30 sec  20 sec       6MWT 1275 ft 1310 ft                            NMR:  - FWD step taps: 20x 6\" step  - FWD step ups: 20x 6\" step  - LAT step ups: 20x 6\" step, 2 sets  - FWD hurdles: 5 laps x 6-9\" hurdles    TE:  - Seated heel/toe raises: 10x  - Seated marchinx ea  - Seated LAQ: 20x ea      Precautions:   Past Medical History:   Diagnosis Date    Arthritis     Hyperparathyroidism (HCC)     Osteoporosis     Pollen allergy     Seizure disorder (HCC)      "

## 2024-04-24 ENCOUNTER — OFFICE VISIT (OUTPATIENT)
Dept: PHYSICAL THERAPY | Facility: MEDICAL CENTER | Age: 64
End: 2024-04-24
Payer: COMMERCIAL
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
## 2024-04-24 DIAGNOSIS — T42.0X4D: ICD-10-CM

## 2024-04-24 DIAGNOSIS — R27.0 ATAXIA: Primary | ICD-10-CM

## 2024-04-24 PROCEDURE — 97112 NEUROMUSCULAR REEDUCATION: CPT | Performed by: PHYSICAL THERAPIST

## 2024-04-24 NOTE — PROGRESS NOTES
"Daily Note     POC expires Auth Status Total   Visits  Start date  Expiration date PT/OT + Visit Limit? Co-Insurance   6/10/24 [id number] 12 3/14/24 6/30/24                                             Visit/Unit Tracking  AUTH Status:  Date 3/18 3/20 3/25 3/27 4/1 4/3 4/8 4/10 4/15 4/17 4/22 4/24   Visits  Authed: 12 Used 1 2 3 4 5 6 7 8 9 10 11 12    Remaining  11 10 9 8 7 6 5 4 3 2 1 0     Today's date: 2024  Patient name: Leighann Dolan  : 1960  MRN: [id number]  Referring provider: Bay Urbina DO  Dx:   Encounter Diagnosis     ICD-10-CM    1. Ataxia  R27.0       2. Fall, subsequent encounter  W19.XXXD       3. Phenytoin poisoning of undetermined intent, subsequent encounter  T42.0X4D                      Subjective: Patient reports that her foot was sore after her last visit      Objective: See treatment diary below      NMR/TE:   - STS: 15x 3 sets  - FWD step taps: 20x 6\" step  - LAT step taps: 20x 6\" step  - FWD step ups: 20x 6\" step  - LAT step ups: 20x 6\" step  - Sidestepping on foam beam: 10 laps   - Sidestepping on foam beam w/ cone taps: 10 laps   - Sidestepping on foam beam w/ 9\" hurdles: 10 laps   - Marching w/ opposite hand/knee taps: 20x  - Marching w/ EC: 20x  - Tandem ambulation: 8 laps x 6 ft  - Braidin laps x 10 ft naming states      HEP: STS, Standing marching, heel/toe raises      Assessment: Patient tolerated treatment well. Increased foam exercises today, challenging patient to maintain balance while navigating over hurdles or when in single leg stance requiring occasional UE support despite fair use of ankle strategy. When coordinating UE/LE during marching patient unable to maintain upright posture and frequently displays posterior LOB that would result in fall without assistance. Patient will benefit from skilled PT services to improve her functional strength, improve her balance, and reduce her risk of falls to maximize her level of safety at home and in the " community.       Plan: Continue per plan of care.        Outcome Measures Initial Eval  3/18/24 PN  4/22/24       5xSTS 13.4 sec 10.2 sec       TUG  - Regular  - Cognitive   10.1 sec  17.9 sec   8.9 sec  10.1 sec       10 meter 1.19 m/s 1.23 m/s       MARY ELLEN /Ethel        FGA 15/30 20/30       DGI /24        mCTSIB  - FTEO (firm)  - FTEC (firm)    - FTEO (foam)  - FTEC (foam)   30 sec  30 sec + sway  30 sec  8 sec   30 sec  30 sec + sway  30 sec  20 sec       6MWT 1275 ft 1310 ft

## 2024-04-29 ENCOUNTER — APPOINTMENT (OUTPATIENT)
Dept: PHYSICAL THERAPY | Facility: MEDICAL CENTER | Age: 64
End: 2024-04-29
Payer: COMMERCIAL

## 2024-05-01 PROBLEM — J32.9 SINUSITIS: Status: RESOLVED | Noted: 2024-03-12 | Resolved: 2024-05-01

## 2024-05-06 ENCOUNTER — APPOINTMENT (OUTPATIENT)
Dept: PHYSICAL THERAPY | Facility: MEDICAL CENTER | Age: 64
End: 2024-05-06
Payer: COMMERCIAL

## 2024-05-08 ENCOUNTER — OFFICE VISIT (OUTPATIENT)
Dept: PHYSICAL THERAPY | Facility: MEDICAL CENTER | Age: 64
End: 2024-05-08
Payer: COMMERCIAL
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
## 2024-05-08 DIAGNOSIS — T42.0X4D: ICD-10-CM

## 2024-05-08 DIAGNOSIS — R27.0 ATAXIA: Primary | ICD-10-CM

## 2024-05-08 PROCEDURE — 97112 NEUROMUSCULAR REEDUCATION: CPT

## 2024-05-08 NOTE — PROGRESS NOTES
"Daily Note     POC expires Auth Status Total   Visits  Start date  Expiration date PT/OT + Visit Limit? Co-Insurance   6/10/24 [id number] 12 3/14/24 6/30/24                                             Visit/Unit Tracking  AUTH Status:  Date 3/18 3/20 3/25 3/27 4/1 4/3 4/8 4/10 4/15 4/17 4/22 4/24   Visits  Authed: 12 Used 1 2 3 4 5 6 7 8 9 10 11 12    Remaining  11 10 9 8 7 6 5 4 3 2 1 0     Today's date: 2024  Patient name: Leighann Dolan  : 1960  MRN: [id number]  Referring provider: Bay Urbina DO  Dx:   Encounter Diagnosis     ICD-10-CM    1. Ataxia  R27.0       2. Phenytoin poisoning of undetermined intent, subsequent encounter  T42.0X4D       3. Fall, subsequent encounter  W19.XXXD           Start Time: 1040  Stop Time: 1120  Total time in clinic (min): 40 minutes    Subjective: Patient reports that her foot was sore after her last visit      Objective: See treatment diary below      NMR/TE:   - STS on foam: 10x 2 sets  - FWD step taps: 20x 6\" step from foam  - LAT step taps: 20x 6\" step from foam  - FWD step ups: 20x 6\" step from foam  - LAT step ups: 20x 6\" step from foam  - Sidestepping on foam beam: 10 laps   - Sidestepping on foam beam w/ cone taps: 10 laps   - Sidestepping on foam beam w/ 9\" hurdles: 10 laps   - Marching w/ opposite hand/knee taps: 20x  - Marching w/ EC: 20x  - Tandem ambulation: 10 laps x 6 ft  - Braidin laps x 10 ft naming states      HEP: STS, Standing marching, heel/toe raises      Assessment: Patient tolerated treatment well. Progressed interventions to be performed on uneven surface with good tolerance. Consistent LOB noted throughout, most often in anterior direction today; poor ankle strategy noted requiring stepping strategy to remain upright. Patient will benefit from skilled PT services to improve her functional strength, improve her balance, and reduce her risk of falls to maximize her level of safety at home and in the community.       Plan: Continue " per plan of care.        Outcome Measures Initial Eval  3/18/24 PN  4/22/24       5xSTS 13.4 sec 10.2 sec       TUG  - Regular  - Cognitive   10.1 sec  17.9 sec   8.9 sec  10.1 sec       10 meter 1.19 m/s 1.23 m/s       MARY ELLEN /Ethel        FGA 15/30 20/30       DGI /24        mCTSIB  - FTEO (firm)  - FTEC (firm)    - FTEO (foam)  - FTEC (foam)   30 sec  30 sec + sway  30 sec  8 sec   30 sec  30 sec + sway  30 sec  20 sec       6MWT 1275 ft 1310 ft

## 2024-05-13 ENCOUNTER — TELEPHONE (OUTPATIENT)
Dept: ENDOCRINOLOGY | Facility: CLINIC | Age: 64
End: 2024-05-13

## 2024-05-13 ENCOUNTER — CLINICAL SUPPORT (OUTPATIENT)
Dept: ENDOCRINOLOGY | Facility: CLINIC | Age: 64
End: 2024-05-13
Payer: COMMERCIAL

## 2024-05-13 ENCOUNTER — OFFICE VISIT (OUTPATIENT)
Dept: PHYSICAL THERAPY | Facility: MEDICAL CENTER | Age: 64
End: 2024-05-13
Payer: COMMERCIAL

## 2024-05-13 VITALS
OXYGEN SATURATION: 97 % | WEIGHT: 114.4 LBS | HEART RATE: 62 BPM | BODY MASS INDEX: 21.6 KG/M2 | DIASTOLIC BLOOD PRESSURE: 82 MMHG | SYSTOLIC BLOOD PRESSURE: 118 MMHG | HEIGHT: 61 IN

## 2024-05-13 DIAGNOSIS — R27.0 ATAXIA: Primary | ICD-10-CM
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
## 2024-05-13 DIAGNOSIS — T42.0X4D: ICD-10-CM

## 2024-05-13 DIAGNOSIS — M81.0 AGE-RELATED OSTEOPOROSIS WITHOUT CURRENT PATHOLOGICAL FRACTURE: Primary | ICD-10-CM

## 2024-05-13 PROCEDURE — 97112 NEUROMUSCULAR REEDUCATION: CPT

## 2024-05-13 PROCEDURE — 96372 THER/PROPH/DIAG INJ SC/IM: CPT

## 2024-05-13 PROCEDURE — 96372 THER/PROPH/DIAG INJ SC/IM: CPT | Performed by: INTERNAL MEDICINE

## 2024-05-13 PROCEDURE — 99202 OFFICE O/P NEW SF 15 MIN: CPT

## 2024-05-13 NOTE — PROGRESS NOTES
"Daily Note     POC expires Auth Status Total   Visits  Start date  Expiration date PT/OT + Visit Limit? Co-Insurance   6/10/24 [id number] 12 3/14/24 6/30/24                                             Visit/Unit Tracking  AUTH Status:  Date 3/18 3/20 3/25 3/27 4/1 4/3 4/8 4/10 4/15 4/17 4/22 4/24   Visits  Authed: 12 Used 1 2 3 4 5 6 7 8 9 10 11 12    Remaining  11 10 9 8 7 6 5 4 3 2 1 0     Visit/Unit Tracking  AUTH Status:  Date              Visits  Authed: 12 Used 1 2              Remaining  11 10               Today's date: 2024  Patient name: Leighann Dolan  : 1960  MRN: [id number]  Referring provider: Bay Urbina DO  Dx:   Encounter Diagnosis     ICD-10-CM    1. Ataxia  R27.0       2. Fall, subsequent encounter  W19.XXXD       3. Phenytoin poisoning of undetermined intent, subsequent encounter  T42.0X4D           Start Time: 1145  Stop Time: 1230  Total time in clinic (min): 45 minutes    Subjective: Patient reports that her foot was sore after her last visit      Objective: See treatment diary below      NMR/TE:   - STS on foam: 10x 2 sets  - FWD cone taps: 20x from foam  - LAT cone taps: 20x 6\" step from foam  - FWD step ups: 20x BOSU  - LAT step ups: 20x 8\" over and back from foam  - Sidestepping on foam beam: 10 laps   - Sidestepping on foam beam w/ cone taps: 10 laps   - Sidestepping on foam beam w/ 9\" hurdles: 10 laps   - Marching w/ opposite hand/knee taps: 20x  - Marching w/ EC: 20x  - Tandem ambulation: 10 laps x 10 ft foam beam  - Braidin laps x 10 ft naming states      HEP: STS, Standing marching, heel/toe raises      Assessment: Patient tolerated treatment well. Great tolerance to all progressions again today. Occasional LOB noted with correction upon VC to \"Scan environment\" and reduce fixation on LE's during dynamic balance interventions. Patient will benefit from skilled PT services to improve her functional strength, improve her balance, and reduce her risk of " falls to maximize her level of safety at home and in the community.       Plan: Continue per plan of care.        Outcome Measures Initial Eval  3/18/24 PN  4/22/24       5xSTS 13.4 sec 10.2 sec       TUG  - Regular  - Cognitive   10.1 sec  17.9 sec   8.9 sec  10.1 sec       10 meter 1.19 m/s 1.23 m/s       MARY ELLEN /Ethel        FGA 15/30 20/30       DGI /24        mCTSIB  - FTEO (firm)  - FTEC (firm)    - FTEO (foam)  - FTEC (foam)   30 sec  30 sec + sway  30 sec  8 sec   30 sec  30 sec + sway  30 sec  20 sec       6MWT 1275 ft 1310 ft

## 2024-05-13 NOTE — PROGRESS NOTES
"Assessment/Plan:    Leighann Dolan came into the [facility] today 05/13/24 to receive Evenity injection.      Verbal consent obtained.  Consent given by: patient    patient states patient has been medically healthy with no underlining concerns/complications.      Leighann Dolan presents with no symptoms today.       All insturctions were reviewed with the patient.    If the patient should have any questions/concerns, advised patient to contacted [facility].       Subjective:     History provided by: patient    Patient ID: Leighann Dolan is a 64 y.o. female      Objective:    Vitals:    05/13/24 1030   BP: 118/82   Pulse: 62   SpO2: 97%   Weight: 51.9 kg (114 lb 6.4 oz)   Height: 5' 1\" (1.549 m)       Patient tolerated the injection well without any complications.  Injection site/s Left and right arm.  Medication was provided by Office.    Patient signed consent form yes   Patient signed ABN form yes (If no patient is not a medicare patient).   Patient waited 15 minutes after injection no (This only applies to patient's receiving first time injection).       Last Visit: 4/9/2024  Next visit:5/13/2024     "

## 2024-05-15 ENCOUNTER — OFFICE VISIT (OUTPATIENT)
Dept: PHYSICAL THERAPY | Facility: MEDICAL CENTER | Age: 64
End: 2024-05-15
Payer: COMMERCIAL

## 2024-05-15 DIAGNOSIS — R27.0 ATAXIA: Primary | ICD-10-CM
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
## 2024-05-15 DIAGNOSIS — T42.0X4D: ICD-10-CM

## 2024-05-15 PROCEDURE — 97112 NEUROMUSCULAR REEDUCATION: CPT

## 2024-05-15 NOTE — PROGRESS NOTES
"Daily Note     POC expires Auth Status Total   Visits  Start date  Expiration date PT/OT + Visit Limit? Co-Insurance   6/10/24 [id number] 12 3/14/24 6/30/24                                             Visit/Unit Tracking  AUTH Status:  Date 3/18 3/20 3/25 3/27 4/1 4/3 4/8 4/10 4/15 4/17 4/22 4/24   Visits  Authed: 12 Used 1 2 3 4 5 6 7 8 9 10 11 12    Remaining  11 10 9 8 7 6 5 4 3 2 1 0     Visit/Unit Tracking  AUTH Status:  Date 5/8 5/13 5/15            Visits  Authed: 12 Used 1 2 3             Remaining  11 10 9              Today's date: 5/15/2024  Patient name: Leighann Dolan  : 1960  MRN: [id number]  Referring provider: Bay Urbina DO  Dx:   Encounter Diagnosis     ICD-10-CM    1. Ataxia  R27.0       2. Fall, subsequent encounter  W19.XXXD       3. Phenytoin poisoning of undetermined intent, subsequent encounter  T42.0X4D           Start Time: 1200  Stop Time: 1240  Total time in clinic (min): 40 minutes    Subjective: Patient reports that her foot was sore after her last visit      Objective: See treatment diary below      NMR/TE:   - STS on foam: 10x 2 sets  - FWD step ups: 20x 8\"  - LAT step ups: 20x 8\" over and back from foam  - Sidestepping on foam beam: 10 laps   - Sidestepping on foam beam w/ cone taps: 10 laps   - Sidestepping on foam beam w/ 9\" hurdles: 10 laps   - Marching w/ opposite hand/knee taps: 20x  - Marching w/ EC: 20x  - Tandem ambulation: 10 laps x 10 ft foam beam with cone taps  - Braidin laps x 10 ft naming states      HEP: STS, Standing marching, heel/toe raises    BP: 118/70 mmHg, L arm, seated  HR: 100 BPM, L radial  SpO2: 82% room air... after 3 mins 96% room air    Assessment: Patient tolerated treatment well. Increased LOB present throughout the day with the patient reporting feeling \"shaky.\" Vitals assessed with initial low oxygen. Improvement noted following seated rest. Patient deemed safe to cont with session.      Plan: Continue per plan of care.    "     Outcome Measures Initial Eval  3/18/24 PN  4/22/24       5xSTS 13.4 sec 10.2 sec       TUG  - Regular  - Cognitive   10.1 sec  17.9 sec   8.9 sec  10.1 sec       10 meter 1.19 m/s 1.23 m/s       MARY ELLEN /Ethel        FGA 15/30 20/30       DGI /24        mCTSIB  - FTEO (firm)  - FTEC (firm)    - FTEO (foam)  - FTEC (foam)   30 sec  30 sec + sway  30 sec  8 sec   30 sec  30 sec + sway  30 sec  20 sec       6MWT 1275 ft 1310 ft

## 2024-05-22 ENCOUNTER — TELEPHONE (OUTPATIENT)
Dept: NEUROLOGY | Facility: CLINIC | Age: 64
End: 2024-05-22

## 2024-05-22 ENCOUNTER — OFFICE VISIT (OUTPATIENT)
Dept: PHYSICAL THERAPY | Facility: MEDICAL CENTER | Age: 64
End: 2024-05-22
Payer: COMMERCIAL

## 2024-05-22 DIAGNOSIS — T42.0X4D: ICD-10-CM
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
## 2024-05-22 DIAGNOSIS — R27.0 ATAXIA: Primary | ICD-10-CM

## 2024-05-22 PROCEDURE — 97112 NEUROMUSCULAR REEDUCATION: CPT

## 2024-05-22 NOTE — PROGRESS NOTES
"Discharge Summary     POC expires Auth Status Total   Visits  Start date  Expiration date PT/OT + Visit Limit? Co-Insurance   6/10/24 [id number] 12 3/14/24 6/30/24                                             Visit/Unit Tracking  AUTH Status:  Date 3/18 3/20 3/25 3/27 4/1 4/3 4/8 4/10 4/15 4/17 4/22 4/24   Visits  Authed: 12 Used 1 2 3 4 5 6 7 8 9 10 11 12    Remaining  11 10 9 8 7 6 5 4 3 2 1 0     Visit/Unit Tracking  AUTH Status:  Date 5/8 5/13 5/15 5/22           Visits  Authed: 12 Used 1 2 3 4            Remaining  11 10 9 8             Today's date: 2024  Patient name: Leighann Dolan  : 1960  MRN: [id number]  Referring provider: Bay Urbina DO  Dx:   Encounter Diagnosis     ICD-10-CM    1. Ataxia  R27.0       2. Fall, subsequent encounter  W19.XXXD       3. Phenytoin poisoning of undetermined intent, subsequent encounter  T42.0X4D           Start Time: 1050  Stop Time: 1115  Total time in clinic (min): 25 minutes    Subjective: Patient denies any falls or LOB since LV, reports compliance with HEP and states she is ~95% better. She wishes to make today her last visit.      Objective: See treatment diary below      NMR/TE:   - STS on foam: 10x 2 sets  - FWD step ups: 20x 8\"  - LAT step ups: 20x 8\" over and back from foam  - Sidestepping on foam beam: 10 laps   - Sidestepping on foam beam w/ cone taps: 10 laps   - Sidestepping on foam beam w/ 9\" hurdles: 10 laps   - Marching w/ opposite hand/knee taps: 20x  - Marching w/ EC: 20x  - Tandem ambulation: 10 laps x 10 ft foam beam with cone taps  - Braidin laps x 10 ft naming states      HEP: STS, Standing marching, heel/toe raises      Assessment: Patient tolerated treatment well. Patient denies any falls or LOB since starting PT, is satisfied with her progress thus far and reports conformability with attempting HEP on her own. Patient is able to manage all sx independently and demonstrates good understanding of and proper form with each of " the exercises included in HEP. Patient has met all personal and established goals for PT and is deemed safe for d/c. POC to remain open for 30 days and patient instructed to contact PT for any change in status.        Plan: Patient to be d/c from PT.       Outcome Measures Initial Eval  3/18/24 PN  4/22/24       5xSTS 13.4 sec 10.2 sec       TUG  - Regular  - Cognitive   10.1 sec  17.9 sec   8.9 sec  10.1 sec       10 meter 1.19 m/s 1.23 m/s       HYDE /Ethel        FGA 15/30 20/30       DGI /24        mCTSIB  - FTEO (firm)  - FTEC (firm)    - FTEO (foam)  - FTEC (foam)   30 sec  30 sec + sway  30 sec  8 sec   30 sec  30 sec + sway  30 sec  20 sec       6MWT 1275 ft 1310 ft

## 2024-05-24 ENCOUNTER — OFFICE VISIT (OUTPATIENT)
Dept: NEUROLOGY | Facility: CLINIC | Age: 64
End: 2024-05-24
Payer: COMMERCIAL

## 2024-05-24 VITALS
HEIGHT: 61 IN | TEMPERATURE: 98.1 F | BODY MASS INDEX: 21.11 KG/M2 | DIASTOLIC BLOOD PRESSURE: 80 MMHG | SYSTOLIC BLOOD PRESSURE: 120 MMHG | WEIGHT: 111.8 LBS

## 2024-05-24 DIAGNOSIS — R27.0 ATAXIA: ICD-10-CM

## 2024-05-24 DIAGNOSIS — E78.5 HYPERLIPIDEMIA, UNSPECIFIED HYPERLIPIDEMIA TYPE: ICD-10-CM

## 2024-05-24 DIAGNOSIS — G40.909 SEIZURE DISORDER (HCC): Primary | ICD-10-CM

## 2024-05-24 PROCEDURE — 99215 OFFICE O/P EST HI 40 MIN: CPT | Performed by: STUDENT IN AN ORGANIZED HEALTH CARE EDUCATION/TRAINING PROGRAM

## 2024-05-24 RX ORDER — ATORVASTATIN CALCIUM 40 MG/1
40 TABLET, FILM COATED ORAL
Qty: 120 TABLET | Refills: 2 | Status: SHIPPED | OUTPATIENT
Start: 2024-05-24 | End: 2025-05-19

## 2024-05-24 RX ORDER — LEVETIRACETAM 500 MG/1
500 TABLET ORAL EVERY 12 HOURS SCHEDULED
Qty: 180 TABLET | Refills: 3 | Status: SHIPPED | OUTPATIENT
Start: 2024-05-24 | End: 2024-05-24 | Stop reason: SDUPTHER

## 2024-05-24 RX ORDER — ATORVASTATIN CALCIUM 40 MG/1
40 TABLET, FILM COATED ORAL
Qty: 60 TABLET | Refills: 0 | Status: SHIPPED | OUTPATIENT
Start: 2024-05-24 | End: 2024-05-24 | Stop reason: SDUPTHER

## 2024-05-24 RX ORDER — BIOTIN 5 MG
TABLET ORAL
COMMUNITY

## 2024-05-24 RX ORDER — LEVETIRACETAM 500 MG/1
500 TABLET ORAL EVERY 12 HOURS SCHEDULED
Qty: 60 TABLET | Refills: 0 | Status: SHIPPED | OUTPATIENT
Start: 2024-05-24 | End: 2024-06-23

## 2024-05-24 NOTE — PROGRESS NOTES
Review of Systems   Constitutional:  Negative for appetite change, fatigue and fever.   HENT: Negative.  Negative for hearing loss, tinnitus, trouble swallowing and voice change.    Eyes: Negative.  Negative for photophobia, pain and visual disturbance.   Respiratory: Negative.  Negative for shortness of breath.    Cardiovascular: Negative.  Negative for palpitations.   Gastrointestinal: Negative.  Negative for nausea and vomiting.   Endocrine: Negative.  Negative for cold intolerance.   Genitourinary: Negative.  Negative for dysuria, frequency and urgency.   Musculoskeletal:  Negative for back pain, gait problem, myalgias, neck pain and neck stiffness.   Skin: Negative.  Negative for rash.   Allergic/Immunologic: Negative.    Neurological: Negative.  Negative for dizziness, tremors, seizures, syncope, facial asymmetry, speech difficulty, weakness, light-headedness, numbness and headaches.   Hematological: Negative.  Does not bruise/bleed easily.   Psychiatric/Behavioral:  Positive for sleep disturbance (Patient not sleeping). Negative for confusion and hallucinations.    All other systems reviewed and are negative.

## 2024-05-24 NOTE — PATIENT INSTRUCTIONS
Getting Keppra level checked    Continue with Keppra 500 twice a day, as well as lipitor and aspirin.    Follow up in 6 months

## 2024-05-24 NOTE — PROGRESS NOTES
[facility]  Initial Consultation    Impression/Plan    Ms. Dolan is a 64 y.o., female with PMH of osteoporosis, cerebellar atrophy and symptomatic epilepsy presenting for post-hospital follow up due to dilantin toxicity. She has been seizure free for decades but has several complications likely due to 40 years of dilantin use: osteoporosis, cerebellar atrophy and peripheral neuropathy. Her neurological exam is shows ataxia with tandem gait which may be due to combination of cerebellar and distal neuropathy related issues. An incidential subacute lacunar stroke was found on MRI and she is on the appropriate secondary stroke prevention measures. Plan outlined below:    #Symptomatic epilepsy  - Keppra 500 BID  - Keppra level pending    #Peripheral neuropathy  - likely 2/2 dilantin  - Will screen A1c, B12, TSH, and SPEP    #Secondary stroke prevention  - C/w lipitor and 81 mg aspirin    #Insomnia  - Recommended lifestyle changes and melatonin if needed.   - Deferred to sleep medicine for now.    - Follow up in 6 months or sooner if needed.    We discussed the pathophysiology of epilepsy. We discussed factors that can lower seizure threshold and the side effects of antiepileptic medications.     Diagnoses and all orders for this visit:    Seizure disorder (HCC)  -     Vitamin B12; Future  -     Thyroid Panel With TSH; Future  -     Hemoglobin A1C; Future  -     Protein electrophoresis, serum; Future  -     Levetiracetam level; Future    Ataxia  -     Discontinue: levETIRAcetam (KEPPRA) 500 mg tablet; Take 1 tablet (500 mg total) by mouth every 12 (twelve) hours  -     levETIRAcetam (KEPPRA) 500 mg tablet; Take 1 tablet (500 mg total) by mouth every 12 (twelve) hours  -     Vitamin B12; Future  -     Thyroid Panel With TSH; Future  -     Hemoglobin A1C; Future  -     Protein electrophoresis, serum; Future    Hyperlipidemia, unspecified hyperlipidemia type  -     Discontinue:  atorvastatin (LIPITOR) 40 mg tablet; Take 1 tablet (40 mg total) by mouth daily at bedtime  -     atorvastatin (LIPITOR) 40 mg tablet; Take 1 tablet (40 mg total) by mouth daily at bedtime    Other orders  -     Krill Oil 1000 MG CAPS          Subjective:  Leighann Dolan is a 64 y.o. female with recent hospitalization for phenytoin toxicity, found to have late acute/subacute infarct vs artifact within right corona radiata on MRI imaging, who is presenting to Neurology office for evaluation of seizures. She also has a PMH of seronegative RA and arthritis. She initially presented to the hospital for a fall on 3/9/2024. Noted falls 1-2 times per month and ataxic gait. Due to phenytoin toxicity, she was transitioned from phenytoin to levetiracetam. Noted she was on phenytoin for 40 years, prior attempt to wean off resulted in breakthrough seizure (was not started on alternative AED during weaning process in the past). MRI brain during hospitalization found questionable CVA versus artifact was seen in the left corona radiata as well as asymmetrical bilateral cerebral atrophy likely related to her long standing phenytoin use. Neurology recommended the patient to start taking a baby aspirin as well as a statin after lipid panel showed hyperlipidemia. She was also to follow up with ENT due to  complete opacification of her left sphenoid sinus suggesting possible inspissated debris versus fungal infection.  Patient did note that she has history of recurrent sinus infections however the last one was several months prior. Recommended 6 week follow up. She was to transition to Keppra 500 BID prior to discharge and stop the phenytoin.    Initial recommendations were for patient to see neurovascular attending. There was an appointment with Dr Esparza scheduled yesterday, unclear why appointment was cancelled.     Today she is seeing me after going to the wrong clinic for Rebecca's scheduled appointment.    She is taking evenity  for osteoporosis.     She had first seizure was around 9-10. Many are focal aware seizures because she will had left arm failing and vision changes (like a static TV screen). She has had a total of 4-5 FTBTC seizures her life, and about 6 of FAS. Most were triggered by stress. Attempts to wean medications in the past have failed.     Her last seizure was in . She can't remember if there was any trigger then.     She is retired from working in [employer] company.    Her PCP was refilling the dilantin medication over the past several years. No levels are in our records prior to her most recent ED visit.    HPI  Current seizure medications:  - levetiracetam 500 mg BID  Other medications as per Epic    Special Features  Status epilepticus: None  Self Injury Seizures: None  Precipitating Factors: Stress, stopping medications.    Epilepsy Risk Factors:  Abnormal pregnancy:    no  Abnormal birth/:   no  Abnormal Development:   no  Febrile seizures, simple:   no  Febrile seizures, complex:   no  CNS infection:    no  Intellectual disability:    no  Cerebral palsy:    no  Head injury (moderate/severe):  no  CNS neoplasm:    no  CNS malformation:    no  Neurosurgical procedure:   no  Stroke:     no  Alcohol abuse:    no  Drug abuse:     no  Family history Sz/epilepsy:   no    Prior AEDs:  Dilantin (toxicity, cerebellar degeneration, osteoporosis)    Prior Evaluation:  - MRI brain 3/10/2024: Small foci of mildly restricted diffusion within the right corona radiata may represent a late acute to early subacute infarct versus artifact. Asymmetric bilateral cerebellar atrophy relative to supratentorial atrophy. Focal FLAIR signal abnormality within the left corona radiata, nonspecific however favored to represent microangiopathic changes in this age group.     - Routine EEG: Decades, reportedly normal    Psychiatric History:  Depression: None  Anxiety: None  Psychosis: None  Psychiatric Admissions:  "None      Objective:    Objective    /80 (BP Location: Left arm, Patient Position: Sitting, Cuff Size: Adult)   Temp 98.1 °F (36.7 °C) (Temporal)   Ht 5' 1\" (1.549 m)   Wt 50.7 kg (111 lb 12.8 oz)   BMI 21.12 kg/m²      General Exam  General: well developed, no acute distress.  HEENT: mucous membranes moist, anicteric sclera.   Neck: supple, good ROM.  Extremities: no clubbing, cyanosis or edema.   Skin: no rash on visible skin.    Neurological Exam  Mental Status: awake, alert, and fully oriented to person, place, time, and situation. Attention and memory intact. Fund of knowledge is appropriate for age and education.  There is no neglect.    Language: fluency, and comprehension normal.       Cranial Nerves: Pupils equal and reactive to light.  Visual fields full to confrontation. Extraocular motions intact with full versions, normal pursuits and saccades. Facial strength full and symmetric. Facial sensation intact in V1-V3. Hearing intact to voice. Tongue protrudes to midline. Palate elevates symmetrically. Speech clear without notable dysarthria. Shoulder shrug activation full and symmetric.    Motor: Normal bulk and tone. No pronator drift. Strength is 5/5 proximally and distally in all 4 extremities. No involuntary movements.    Sensory: Sensation intact to light touch distally in all extremities.    Coordination: Normal finger-to-nose. Normal rapid alternating movements.     Station and gait: Casual gait normal. Normal Romberg. Difficulty with tandem gait.    Reflexes: Reflexes 2+ in uppers. Mute b/l achilles     ROS:    Constitutional:  Negative for appetite change, fatigue and fever.   HENT: Negative.  Negative for hearing loss, tinnitus, trouble swallowing and voice change.    Eyes: Negative.  Negative for photophobia, pain and visual disturbance.   Respiratory: Negative.  Negative for shortness of breath.    Cardiovascular: Negative.  Negative for palpitations.   Gastrointestinal: Negative.  " Negative for nausea and vomiting.   Endocrine: Negative.  Negative for cold intolerance.   Genitourinary: Negative.  Negative for dysuria, frequency and urgency.   Musculoskeletal:  Negative for back pain, gait problem, myalgias, neck pain and neck stiffness.   Skin: Negative.  Negative for rash.   Allergic/Immunologic: Negative.    Neurological: Negative.  Negative for dizziness, tremors, seizures, syncope, facial asymmetry, speech difficulty, weakness, light-headedness, numbness and headaches.   Hematological: Negative.  Does not bruise/bleed easily.   Psychiatric/Behavioral:  Positive for sleep disturbance (Patient not sleeping). Negative for confusion and hallucinations.    All other systems reviewed and are negative.    This note may have been created using voice recognition software. There may be unintentional errors such as grammatical errors, spelling errors, or pronoun errors.       Kimani Ba MD   [facility]  Diplomate, ABPN Neurology, Clinical Neurophysiology, and Epilepsy

## 2024-05-29 ENCOUNTER — APPOINTMENT (OUTPATIENT)
Dept: PHYSICAL THERAPY | Facility: MEDICAL CENTER | Age: 64
End: 2024-05-29
Payer: COMMERCIAL

## 2024-05-31 ENCOUNTER — TELEPHONE (OUTPATIENT)
Dept: NEUROLOGY | Facility: CLINIC | Age: 64
End: 2024-05-31

## 2024-05-31 NOTE — TELEPHONE ENCOUNTER
Received VM transcription from 5/31/24, 10:40 AM:    Good morning. [facility] is [facility] calling concerning an electronic prescription we received for patient, Leighann Dolan [birth year] on some 500 mg Levetiracetam tabs. We also received a stop message on this prescription. We just need to verify if we're to fill this or not. Please return our call at [phone] give reference number [id number]. We will need a response by 2 PM Eastern daylight time on Monday, June 3rd. This is our final attempt to fill this prescription. Thanks and have a great day.  ---------------------    Fax from  scanned to this encounter. Please address. Thank you!

## 2024-06-17 ENCOUNTER — CLINICAL SUPPORT (OUTPATIENT)
Dept: ENDOCRINOLOGY | Facility: CLINIC | Age: 64
End: 2024-06-17
Payer: COMMERCIAL

## 2024-06-17 VITALS — HEART RATE: 68 BPM | OXYGEN SATURATION: 98 % | SYSTOLIC BLOOD PRESSURE: 126 MMHG | DIASTOLIC BLOOD PRESSURE: 78 MMHG

## 2024-06-17 DIAGNOSIS — M81.0 OSTEOPOROSIS WITHOUT CURRENT PATHOLOGICAL FRACTURE, UNSPECIFIED OSTEOPOROSIS TYPE: Primary | ICD-10-CM

## 2024-06-17 PROCEDURE — 96372 THER/PROPH/DIAG INJ SC/IM: CPT

## 2024-06-17 PROCEDURE — 96372 THER/PROPH/DIAG INJ SC/IM: CPT | Performed by: INTERNAL MEDICINE

## 2024-06-17 NOTE — PROGRESS NOTES
Assessment/Plan:    Leighann Dolan came into the [facility] Endocrinology Office today 06/17/24 to receive evenity injection.      Verbal consent obtained.  Consent given by: patient    patient states patient has been medically healthy with no underlining concerns/complications.      Leighann Dolan presents with no symptoms today.       All insturctions were reviewed with the patient.    If the patient should have any questions/concerns, advised patient to contacted [facility].       Subjective:     History provided by: patient    Patient ID: Leighann Dolan is a 64 y.o. female      Objective:    Vitals:    06/17/24 1044   BP: 126/78   BP Location: Right arm   Patient Position: Sitting   Cuff Size: Standard   Pulse: 68   SpO2: 98%       Patient tolerated the injection well without any complications.  Injection site/s left and right arm.  Medication was provided by office.    Patient signed consent form yes   Patient signed ABN form no (If no patient is not a medicare patient).   Patient waited 15 minutes after injection no (This only applies to patient's receiving first time injection).       Last Visit: 5/13/2024  Next visit:7/19/2024

## 2024-07-19 ENCOUNTER — CLINICAL SUPPORT (OUTPATIENT)
Dept: ENDOCRINOLOGY | Facility: CLINIC | Age: 64
End: 2024-07-19
Payer: COMMERCIAL

## 2024-07-19 VITALS — OXYGEN SATURATION: 96 % | HEART RATE: 68 BPM | SYSTOLIC BLOOD PRESSURE: 130 MMHG | DIASTOLIC BLOOD PRESSURE: 88 MMHG

## 2024-07-19 DIAGNOSIS — M81.0 OSTEOPOROSIS WITHOUT CURRENT PATHOLOGICAL FRACTURE, UNSPECIFIED OSTEOPOROSIS TYPE: Primary | ICD-10-CM

## 2024-07-19 PROCEDURE — 96372 THER/PROPH/DIAG INJ SC/IM: CPT

## 2024-07-19 NOTE — PROGRESS NOTES
Assessment/Plan:    Leighann Dolan came into the [facility] Endocrinology Office today 07/19/24 to receive evenity injection.      Verbal consent obtained.  Consent given by: patient    patient states patient has been medically healthy with no underlining concerns/complications.      Leighann Dolan presents with no symptoms today.       All insturctions were reviewed with the patient.    If the patient should have any questions/concerns, advised patient to contacted [facility] Endocrinology Office.       Subjective:     History provided by: patient    Patient ID: Leighann Dolan is a 64 y.o. female      Objective:    Vitals:    07/19/24 1019   BP: 130/88   BP Location: Left arm   Patient Position: Sitting   Cuff Size: Large   Pulse: 68   SpO2: 96%       Patient tolerated the injection well without any complications.  Injection site/s left and right arm.  Medication was provided by office.    Patient signed consent form yes   Patient signed ABN form no (If no patient is not a medicare patient).   Patient waited 15 minutes after injection no (This only applies to patient's receiving first time injection).       Last Visit: 6/17/2024  Next visit:8/21/2024

## 2024-08-03 ENCOUNTER — APPOINTMENT (OUTPATIENT)
Dept: LAB | Facility: MEDICAL CENTER | Age: 64
End: 2024-08-03
Payer: COMMERCIAL

## 2024-08-03 DIAGNOSIS — E55.9 VITAMIN D DEFICIENCY: ICD-10-CM

## 2024-08-03 LAB — 25(OH)D3 SERPL-MCNC: 63.8 NG/ML (ref 30–100)

## 2024-08-03 PROCEDURE — 36415 COLL VENOUS BLD VENIPUNCTURE: CPT

## 2024-08-03 PROCEDURE — 82306 VITAMIN D 25 HYDROXY: CPT

## 2024-08-21 ENCOUNTER — CLINICAL SUPPORT (OUTPATIENT)
Dept: ENDOCRINOLOGY | Facility: CLINIC | Age: 64
End: 2024-08-21
Payer: COMMERCIAL

## 2024-08-21 ENCOUNTER — TELEPHONE (OUTPATIENT)
Dept: ENDOCRINOLOGY | Facility: CLINIC | Age: 64
End: 2024-08-21

## 2024-08-21 VITALS
HEIGHT: 61 IN | SYSTOLIC BLOOD PRESSURE: 116 MMHG | DIASTOLIC BLOOD PRESSURE: 80 MMHG | OXYGEN SATURATION: 98 % | HEART RATE: 77 BPM | BODY MASS INDEX: 21.12 KG/M2

## 2024-08-21 DIAGNOSIS — M81.0 OSTEOPOROSIS WITHOUT CURRENT PATHOLOGICAL FRACTURE, UNSPECIFIED OSTEOPOROSIS TYPE: Primary | ICD-10-CM

## 2024-08-21 PROCEDURE — 96372 THER/PROPH/DIAG INJ SC/IM: CPT

## 2024-09-24 ENCOUNTER — TELEPHONE (OUTPATIENT)
Dept: ENDOCRINOLOGY | Facility: CLINIC | Age: 64
End: 2024-09-24

## 2024-09-24 ENCOUNTER — CLINICAL SUPPORT (OUTPATIENT)
Dept: ENDOCRINOLOGY | Facility: CLINIC | Age: 64
End: 2024-09-24
Payer: COMMERCIAL

## 2024-09-24 VITALS
BODY MASS INDEX: 21.12 KG/M2 | DIASTOLIC BLOOD PRESSURE: 82 MMHG | SYSTOLIC BLOOD PRESSURE: 120 MMHG | HEIGHT: 61 IN | HEART RATE: 67 BPM | OXYGEN SATURATION: 95 %

## 2024-09-24 DIAGNOSIS — M81.0 AGE-RELATED OSTEOPOROSIS WITHOUT CURRENT PATHOLOGICAL FRACTURE: Primary | ICD-10-CM

## 2024-09-24 PROCEDURE — 96372 THER/PROPH/DIAG INJ SC/IM: CPT

## 2024-09-24 NOTE — PROGRESS NOTES
"Assessment/Plan:    Leighann Dolan came into the [facility] Endocrinology Office today 09/24/24 to receive Evenity injection.      Verbal consent obtained.  Consent given by: patient    patient states patient has been medically healthy with no underlining concerns/complications.      Leighann Dolan presents with no symptoms today.       All insturctions were reviewed with the patient.    If the patient should have any questions/concerns, advised patient to contacted [facility] Endocrinology Office.       Subjective:     History provided by: patient    Patient ID: Leighann Dolan is a 64 y.o. female      Objective:    Vitals:    09/24/24 1002   BP: 120/82   Pulse: 67   SpO2: 95%   Height: 5' 1\" (1.549 m)       Patient tolerated the injection well without any complications.  Injection site/s Left and right arm.  Medication was provided by Office.    Patient signed consent form yes   Patient signed ABN form yes (If no patient is not a medicare patient).   Patient waited 15 minutes after injection no (This only applies to patient's receiving first time injection).       Last Visit: 8/21/2024  Next visit:9/24/2024      "

## 2024-10-29 ENCOUNTER — CLINICAL SUPPORT (OUTPATIENT)
Dept: ENDOCRINOLOGY | Facility: CLINIC | Age: 64
End: 2024-10-29
Payer: COMMERCIAL

## 2024-10-29 VITALS — SYSTOLIC BLOOD PRESSURE: 126 MMHG | OXYGEN SATURATION: 97 % | HEART RATE: 57 BPM | DIASTOLIC BLOOD PRESSURE: 80 MMHG

## 2024-10-29 DIAGNOSIS — M81.0 AGE-RELATED OSTEOPOROSIS WITHOUT CURRENT PATHOLOGICAL FRACTURE: Primary | ICD-10-CM

## 2024-10-29 PROCEDURE — 96372 THER/PROPH/DIAG INJ SC/IM: CPT

## 2024-10-29 NOTE — PROGRESS NOTES
Assessment/Plan:    Leighann Dolan came into the [facility] Endocrinology Office today 10/29/24 to receive   Evenity injection.      Verbal consent obtained.  Consent given by: patient    patient states patient has been medically healthy with no underlining concerns/complications.      Leighann Dolan presents with no symptoms today.       All insturctions were reviewed with the patient.    If the patient should have any questions/concerns, advised patient to contacted [facility] Endocrinology Office.       Subjective:     History provided by: patient    Patient ID: Leighann Dolan is a 64 y.o. female      Objective:    Vitals:    10/29/24 1309   BP: 126/80   Pulse: 57   SpO2: 97%       Patient tolerated the injection well without any complications.  Injection site/s Left and right arm.  Medication was provided by Office.    Patient signed consent form yes   Patient signed ABN form yes (If no patient is not a medicare patient).   Patient waited 15 minutes after injection no (This only applies to patient's receiving first time injection).       Last Visit: 9/24/2024  Next visit:11/20/2024

## 2024-11-20 ENCOUNTER — OFFICE VISIT (OUTPATIENT)
Dept: ENDOCRINOLOGY | Facility: CLINIC | Age: 64
End: 2024-11-20
Payer: COMMERCIAL

## 2024-11-20 VITALS
HEIGHT: 61 IN | OXYGEN SATURATION: 99 % | DIASTOLIC BLOOD PRESSURE: 78 MMHG | SYSTOLIC BLOOD PRESSURE: 116 MMHG | WEIGHT: 127 LBS | HEART RATE: 61 BPM | BODY MASS INDEX: 23.98 KG/M2

## 2024-11-20 DIAGNOSIS — M81.0 AGE-RELATED OSTEOPOROSIS WITHOUT CURRENT PATHOLOGICAL FRACTURE: ICD-10-CM

## 2024-11-20 DIAGNOSIS — E04.2 MULTIPLE THYROID NODULES: Primary | ICD-10-CM

## 2024-11-20 DIAGNOSIS — E55.9 VITAMIN D DEFICIENCY: ICD-10-CM

## 2024-11-20 PROCEDURE — 99214 OFFICE O/P EST MOD 30 MIN: CPT | Performed by: INTERNAL MEDICINE

## 2024-11-20 RX ORDER — VITAMIN B COMPLEX
1000 TABLET ORAL
COMMUNITY

## 2024-11-20 NOTE — PROGRESS NOTES
Leighann Dolan 64 y.o. female MRN: [id number]    Encounter: [id number]      Assessment & Plan     Assessment:  This is a 64 y.o.-year-old female with hypothyroidism.    Plan:  Diagnoses and all orders for this visit:    Multiple thyroid nodules  Thyroid nodules previously biopsied in 2016 showed benign results by Afirma  Will order thyroid ultrasound to follow-up  Most recent thyroid ultrasound was done in November 2023 showed right mid pole nodule measuring 1.9 x 0.9 x 1.3 cm  Left mid gland nodule measuring 1 x 0.8 x 0.8 cm  Nodule meet criteria for biopsy but follow-up ultrasound is recommended in 1 year  Order thyroid ultrasound to be done now    -     US thyroid; Future    Age-related osteoporosis without current pathological fracture  Continue Evenity injection every 1 month.  When she completes Evenity, we will have to switch either to Prolia injection based on her insurance coverage or Reclast infusion once a year.  Discussed the importance of fall precautions.  Continue vitamin D3 supplementation 2000 IU daily  -     Basic metabolic panel; Future  -     Vitamin D 25 hydroxy; Future    Vitamin D deficiency  Continue vitamin D3 supplementation 2000 IU daily  -     Basic metabolic panel; Future  -     Vitamin D 25 hydroxy; Future    Other orders  -     cholecalciferol (VITAMIN D3) 25 mcg (1,000 units) tablet; Take 1,000 Units by mouth         CC:   Osteoporosis    History of Present Illness     HPI:    Leighann Dolan is 63-year-old woman with medical history of primary hyper parathyroidism status post minimally invasive parathyroid adenoma surgery, with normalization of calcium, osteoporosis, vitamin D deficiency, multiple thyroid nodule is here for follow-up.    She has a history of osteoporosis, she was treated with Prolia injection every 6 months however it was denied by insurance and she was switched to bisphosphonate 70 mg once a week.  She had history of noncompliance to Fosamax  Based on last DEXA scan  report, she was started on Evenity monthly injection.  She has received so far 7 Evenity injections  She is tolerating it well    Has history of multinodular goiter, admitted FNA of left upper pole middle pole and lower pole nodule in 2016 showed atypia of undetermined significance, result of Afirma was benign.    Denies family history of thyroid cancer, history of radiation to neck chest or head area    Component      Latest Ref Rng 3/12/2024 8/3/2024   Sodium      135 - 147 mmol/L 139     Potassium      3.5 - 5.3 mmol/L 4.1     Chloride      96 - 108 mmol/L 107     Carbon Dioxide      21 - 32 mmol/L 28     ANION GAP      mmol/L 4     BUN      5 - 25 mg/dL 16     Creatinine      0.60 - 1.30 mg/dL 0.61     GLUCOSE      65 - 140 mg/dL 95     Calcium      8.4 - 10.2 mg/dL 8.9     GFR, Calculated      ml/min/1.73sq m 97     Cholesterol      See Comment mg/dL  160    Triglycerides      See Comment mg/dL  77    HDL      >=50 mg/dL  72    LDL Calculated      0 - 100 mg/dL  73    Non-HDL Cholesterol      mg/dl  88    VITAMIN D, 25-HYDROXY      30.0 - 100.0 ng/mL  63.8        Lab Results   Component Value Date    OHW8EARNDYCD 0.824 01/03/2019       Review of Systems   Constitutional:  Negative for activity change, diaphoresis, fatigue, fever and unexpected weight change.   HENT: Negative.     Eyes:  Negative for visual disturbance.   Respiratory:  Negative for cough, chest tightness and shortness of breath.    Cardiovascular:  Negative for chest pain, palpitations and leg swelling.   Gastrointestinal:  Negative for abdominal pain, blood in stool, constipation, diarrhea, nausea and vomiting.   Endocrine: Negative for cold intolerance, heat intolerance, polydipsia, polyphagia and polyuria.   Genitourinary:  Negative for dysuria, enuresis, frequency and urgency.   Musculoskeletal:  Negative for arthralgias and myalgias.   Skin:  Negative for pallor, rash and wound.   Allergic/Immunologic: Negative.    Neurological:  Negative  "for dizziness, tremors, weakness and numbness.   Hematological: Negative.    Psychiatric/Behavioral: Negative.         Historical Information   Past Medical History:   Diagnosis Date    Arthritis     Hyperparathyroidism (HCC)     Osteoporosis     Pollen allergy     Seizure disorder (HCC)      Past Surgical History:   Procedure Laterality Date    BUNIONECTOMY      PARATHYROID GLAND SURGERY      MD PARATHYROIDECTOMY/EXPLORATION PARATHYROIDS Left 02/25/2016    Procedure: MINIMALLY INVASIVE PARATHYROIDECTOMY; PTH MONITORING;  Surgeon: Erlin Camarena MD;  Location: [facility];  Service: Surgical Oncology    THYROID SURGERY      TUBAL LIGATION       Social History   Social History     Substance and Sexual Activity   Alcohol Use Yes    Comment: social     Social History     Substance and Sexual Activity   Drug Use No     Social History     Tobacco Use   Smoking Status Former    Current packs/day: 0.50    Types: Cigarettes   Smokeless Tobacco Never     Family History:   Family History   Problem Relation Age of Onset    Thyroid disease Daughter        Meds/Allergies   Current Outpatient Medications   Medication Sig Dispense Refill    atorvastatin (LIPITOR) 40 mg tablet Take 1 tablet (40 mg total) by mouth daily at bedtime 120 tablet 2    Krill Oil 1000 MG CAPS       aspirin (ECOTRIN LOW STRENGTH) 81 mg EC tablet Take 1 tablet (81 mg total) by mouth daily 30 tablet 0    cholecalciferol (VITAMIN D3) 25 mcg (1,000 units) tablet Take 1,000 Units by mouth      levETIRAcetam (KEPPRA) 500 mg tablet Take 1 tablet (500 mg total) by mouth every 12 (twelve) hours 60 tablet 0     No current facility-administered medications for this visit.     Allergies   Allergen Reactions    Amoxicillin-Pot Clavulanate     Penicillins Itching and Rash     Specifically Amoxicillin       Objective   Vitals: Blood pressure 116/78, pulse 61, height 5' 1\" (1.549 m), weight 57.6 kg (127 lb), SpO2 99%, not currently breastfeeding.    Physical Exam  Vitals " reviewed.   Constitutional:       General: She is not in acute distress.     Appearance: Normal appearance. She is not ill-appearing.   HENT:      Head: Normocephalic and atraumatic.      Nose: Nose normal.   Eyes:      Extraocular Movements: Extraocular movements intact.      Conjunctiva/sclera: Conjunctivae normal.   Pulmonary:      Effort: No respiratory distress.   Musculoskeletal:      Cervical back: Normal range of motion.   Neurological:      General: No focal deficit present.      Mental Status: She is alert and oriented to person, place, and time.   Psychiatric:         Mood and Affect: Mood normal.         Behavior: Behavior normal.         The history was obtained from the review of the chart, patient.    Lab Results:   Lab Results   Component Value Date/Time    Potassium 4.1 03/12/2024 04:59 AM    Potassium 3.9 03/11/2024 04:40 AM    Potassium 4.0 03/10/2024 12:00 AM    Chloride 107 03/12/2024 04:59 AM    Chloride 108 03/11/2024 04:40 AM    Chloride 109 (H) 03/10/2024 12:00 AM    CO2 28 03/12/2024 04:59 AM    CO2 25 03/11/2024 04:40 AM    CO2 25 03/10/2024 12:00 AM    BUN 16 03/12/2024 04:59 AM    BUN 17 03/11/2024 04:40 AM    BUN 12 03/10/2024 12:00 AM    Creatinine 0.61 03/12/2024 04:59 AM    Creatinine 0.61 03/11/2024 04:40 AM    Creatinine 0.68 03/10/2024 12:00 AM    Calcium 8.9 03/12/2024 04:59 AM    Calcium 8.6 03/11/2024 04:40 AM    Calcium 8.5 03/10/2024 12:00 AM    eGFR 97 03/12/2024 04:59 AM    eGFR 97 03/11/2024 04:40 AM    eGFR 94 03/10/2024 12:00 AM    Vit D, 25-Hydroxy 63.8 08/03/2024 10:27 AM         Imaging Studies:         Results for orders placed during the hospital encounter of 03/19/24    DXA bone density spine hip and pelvis    Impression  1. Osteoporosis.    2.  Since a DXA study from 1/5/2019, there has been:  A  STATISTICALLY SIGNIFICANT DECREASE in bone mineral density of 0.045 g/cm2 (6.5%) in the lumbar spine.  A  STATISTICALLY SIGNIFICANT DECREASE in bone mineral density of  0.047 g/cm2 (8.2%) in the left total hip.      3.  The 10 year risk of hip fracture is 13% with the 10 year risk of major osteoporotic fracture being 36% as calculated by the [employer] fracture risk assessment tool (FRAX, which is based on data generated by the WHO Collaborating [place]  for Metabolic Bone Diseases).    4.  The current NOF guidelines recommend treating patients with a T-score of -2.5 or less in the lumbar spine or hips, or in post-menopausal women and men over the age of 50 with low bone mass (osteopenia) and a FRAX 10 year risk score of >3% for hip  fracture and/or >20% for major osteoporotic fracture.    5.  The NOF recommends follow-up DXA in 1-2 years after initiating therapy for osteoporosis and every 2 years thereafter. More frequent evaluation is appropriate for patients with conditions associated with rapid bone loss, such as glucocorticoid  therapy. The interval between DXA screenings may be longer for individuals without major risk factors and initial T-score in the normal or upper low bone mass range.    The FRAX algorithm has certain limitations:  -FRAX has not been validated in patients currently or previously treated with pharmacotherapy for osteoporosis.  In such patients, clinical judgment must be exercised in interpreting FRAX scores.  -Prior hip, vertebral and humeral fragility fractures appear to confer greater risk of subsequent fracture than fractures at other sites (this is especially true for individuals with severe vertebral fractures), but quantification of this incremental  risk is not possible with FRAX.  -FRAX underestimates fracture risk in patients with history of multiple fragility fractures.  -FRAX may underestimate fracture risk in patients with history of frequent falls.  -It is not appropriate to use FRAX to monitor treatment response.      WHO CLASSIFICATION:  Normal (a T-score of -1.0 or higher)  Low bone mineral density (a T-score of less than -1.0  "but higher than -2.5)  Osteoporosis (a T-score of -2.5 or less)  Severe osteoporosis (a T-score of -2.5 or less with a fragility fracture)      LEAST SIGNIFICANT CHANGE (AT 95% C.I):    Lumbar spine 0.036 g/cm2; 2.2%  Total hip: 0.022 g/cm2; 2.6%  Forearm: 0.017 g/cm2; 3.0%.    Workstation performed: [id number]            Results Review Statement: No pertinent imaging studies reviewed.    Portions of the record may have been created with voice recognition software. Occasional wrong word or \"sound a like\" substitutions may have occurred due to the inherent limitations of voice recognition software. Read the chart carefully and recognize, using context, where substitutions have occurred.    "

## 2024-11-22 ENCOUNTER — APPOINTMENT (OUTPATIENT)
Dept: LAB | Facility: MEDICAL CENTER | Age: 64
End: 2024-11-22
Payer: COMMERCIAL

## 2024-11-22 DIAGNOSIS — G40.909 SEIZURE DISORDER (HCC): ICD-10-CM

## 2024-11-22 DIAGNOSIS — R27.0 ATAXIA: ICD-10-CM

## 2024-11-22 LAB
EST. AVERAGE GLUCOSE BLD GHB EST-MCNC: 120 MG/DL
HBA1C MFR BLD: 5.8 %
LEVETIRACETAM SERPL-MCNC: 17.3 UG/ML (ref 12–46)
TSH SERPL DL<=0.05 MIU/L-ACNC: 1.13 UIU/ML (ref 0.45–4.5)
VIT B12 SERPL-MCNC: 244 PG/ML (ref 180–914)

## 2024-11-22 PROCEDURE — 84165 PROTEIN E-PHORESIS SERUM: CPT

## 2024-11-22 PROCEDURE — 84436 ASSAY OF TOTAL THYROXINE: CPT

## 2024-11-22 PROCEDURE — 83036 HEMOGLOBIN GLYCOSYLATED A1C: CPT

## 2024-11-22 PROCEDURE — 84443 ASSAY THYROID STIM HORMONE: CPT

## 2024-11-22 PROCEDURE — 83520 IMMUNOASSAY QUANT NOS NONAB: CPT

## 2024-11-22 PROCEDURE — 36415 COLL VENOUS BLD VENIPUNCTURE: CPT

## 2024-11-22 PROCEDURE — 84479 ASSAY OF THYROID (T3 OR T4): CPT

## 2024-11-22 PROCEDURE — 82607 VITAMIN B-12: CPT

## 2024-11-23 LAB
DEPRECATED FTI SERPL-MCNC: 1.8 UG/DL (ref 1.2–4.9)
T3RU NFR SERPL: 23 % (ref 24–39)
T4 SERPL-MCNC: 7.9 UG/DL (ref 4.5–12)

## 2024-11-25 ENCOUNTER — OFFICE VISIT (OUTPATIENT)
Dept: NEUROLOGY | Facility: CLINIC | Age: 64
End: 2024-11-25
Payer: COMMERCIAL

## 2024-11-25 VITALS
SYSTOLIC BLOOD PRESSURE: 108 MMHG | TEMPERATURE: 97.7 F | WEIGHT: 128.5 LBS | DIASTOLIC BLOOD PRESSURE: 72 MMHG | BODY MASS INDEX: 24.28 KG/M2

## 2024-11-25 DIAGNOSIS — R27.0 ATAXIA: ICD-10-CM

## 2024-11-25 DIAGNOSIS — I63.81 LACUNAR INFARCTION (HCC): ICD-10-CM

## 2024-11-25 DIAGNOSIS — G40.909 SEIZURE DISORDER (HCC): ICD-10-CM

## 2024-11-25 DIAGNOSIS — G62.9 PERIPHERAL NEUROPATHY: Primary | ICD-10-CM

## 2024-11-25 PROCEDURE — 99215 OFFICE O/P EST HI 40 MIN: CPT | Performed by: STUDENT IN AN ORGANIZED HEALTH CARE EDUCATION/TRAINING PROGRAM

## 2024-11-25 RX ORDER — DIPHENHYDRAMINE HCL 25 MG
CAPSULE ORAL AS NEEDED
COMMUNITY
Start: 2024-08-02

## 2024-11-25 RX ORDER — LEVETIRACETAM 500 MG/1
500 TABLET ORAL EVERY 12 HOURS SCHEDULED
Qty: 180 TABLET | Refills: 3 | Status: SHIPPED | OUTPATIENT
Start: 2024-11-25 | End: 2025-11-20

## 2024-11-25 NOTE — PROGRESS NOTES
Name: Leighann Dolan      : 1960      MRN: [id number]  Encounter Provider: Kimani Ba MD  Encounter Date: 2024   Encounter department: [facility] [place]    :  Assessment & Plan      {Ambulatory Patient Instructions (Optional):68512}  History of Present Illness {?Quick Links Encounters * My Last Note * Last Note in Specialty * Snapshot * Since Last Visit * History :25307}  HPI  Review of Systems   Constitutional:  Negative for appetite change, fatigue and fever.   HENT: Negative.  Negative for hearing loss, tinnitus, trouble swallowing and voice change.    Eyes: Negative.  Negative for photophobia, pain and visual disturbance.   Respiratory: Negative.  Negative for shortness of breath.    Cardiovascular: Negative.  Negative for palpitations.   Gastrointestinal: Negative.  Negative for nausea and vomiting.   Endocrine: Negative.  Negative for cold intolerance.   Genitourinary: Negative.  Negative for dysuria, frequency and urgency.   Musculoskeletal:  Negative for back pain, gait problem, myalgias, neck pain and neck stiffness.   Skin: Negative.  Negative for rash.   Allergic/Immunologic: Negative.    Neurological: Negative.  Negative for dizziness, tremors, seizures, syncope, facial asymmetry, speech difficulty, weakness, light-headedness, numbness and headaches.   Hematological: Negative.  Does not bruise/bleed easily.   Psychiatric/Behavioral: Negative.  Negative for confusion, hallucinations and sleep disturbance.    All other systems reviewed and are negative.    I have personally reviewed the MA's review of systems and made changes as necessary.    {Select to insert medical history sections (Optional):86489}     Objective {?Quick Links Trend Vitals * Enter New Vitals * Results Review * Timeline (Adult) * Labs * Imaging * Cardiology * Procedures * Lung Cancer Screening * Surgical eConsent :49923}  There were no vitals taken for this visit.    Physical Exam  Neurologic  Exam    Results/Data:  I have reviewed the results and images from the *** in detail with the patient.    {SL AMB Radiology Results Review (Optional):65481}    {Administrative / Billing Section (Optional):84081}

## 2024-11-25 NOTE — PATIENT INSTRUCTIONS
B12 up to 250 mcg a day.     Check B12 level in a few months    Address sugar control with less Marin original    Continue Keppra 500 twice a day    Follow up in 6 months.

## 2024-11-25 NOTE — PROGRESS NOTES
[facility] -   Follow up appointment    Impression/Plan    Leighann Dolan is a 64 y.o. female with a PMH of epilepsy, corona radiata infarct, peripheral neuropathy and an episode of phenytoin toxicity presenting to the [facility] for follow up. Her neurological exam is consistent with a known peripheral neuropathy. Overall, her symptoms and physical exam are stable since her last appointment. She shows a mild B12 deficiency and pre-diabetes, both of which could worsen peripheral neuropathy. Plan outlined below:    #Symptomatic epilepsy  - Keppra 500 BID    #Peripheral neuropathy  - B12 supplementation, checking level again in 2 months  - Pre-diabetic management by PCP  - F/u SPEP results    #Secondary stroke prevention  - C/w lipitor and 81 mg aspirin    - Follow up in 6 months    We discussed the pathophysiology of epilepsy/seizure and seizure safety/precautions including driving restrictions following seizures. We discussed factors that can lower seizure threshold and the side effects of antiepileptic medications.     Diagnoses and all orders for this visit:    Peripheral neuropathy  -     Vitamin B12; Future    Ataxia  -     levETIRAcetam (KEPPRA) 500 mg tablet; Take 1 tablet (500 mg total) by mouth every 12 (twelve) hours  -     Vitamin B12; Future    Seizure disorder (HCC)    Lacunar infarction (HCC)    Other orders  -     diphenhydrAMINE (Benadryl Allergy) 25 mg capsule; Take by mouth if needed        Subjective    Leighann Dolan is a 64 y.o. female with a PMH of epilepsy, corona radiata infarct, peripheral neuropathy and an episode of phenytoin toxicity presenting to the [facility] for follow up.     For review:     She had first seizure was around 9-10. Many are focal aware seizures because she will had left arm failing and vision changes (like a static TV screen). She has had a total of 4-5 FTBTC seizures her life, and about 6 of  FAS. Most were triggered by stress. Attempts to wean medications in the past have failed.     Her last seizure was in 2001. She can't remember if there was any trigger then.     She presented to the hospital for a fall on 3/9/2024 to due falls and an ataxic gait. Due to phenytoin toxicity, she was transitioned from phenytoin to levetiracetam. She was on phenytoin for 40 years, prior attempt to wean off resulted in breakthrough seizure (was not started on alternative AED during weaning process in the past). MRI brain during hospitalization found questionable CVA versus artifact was seen in the left corona radiata as well as asymmetrical bilateral cerebral atrophy likely related to her long standing phenytoin use. Neurology recommended the patient to start taking a baby aspirin as well as a statin after lipid panel showed hyperlipidemia. She was also to follow up with ENT due to complete opacification of her left sphenoid sinus suggesting possible inspissated debris versus fungal infection. She was to transition to Keppra 500 BID prior to discharge and stop the phenytoin.     She established care with me on 5/24/2024. Physical examination showed a peripheral neuropathy. It was felt that the dilantin was responsible for this, in addition to osteoporosis and cerebellar atrophy. Plan at the time was to continue secondary stroke prevention measures (lipitor, aspirin), complete bloodwork for peripheral neuropathy, and check Keppra level.    Interval history:    Patient recently got blood work done from her last appointment. Her B12 was 244, A1c was 5.8, Keppra level was 17.3, SPEP is still pending.    She still notes that she still has issues with the feeling on her feet. Like she walking with something in her feet. She hasn't noticed any progression.     She is taking her Vit D on a regular basis. She is following with endocrine regularly. There is a possible plan to switch to Prolia.     Since last seen, the patient has  been doing well. There have been no seizures since the last appointment. The patients AEDs were being tolerated well with no side effects. There is no concern for medication non-adherence.     Current seizure medications:  - levetiracetam 500 mg BID  Other medications as per Epic     Special Features  Status epilepticus: None  Self Injury Seizures: None  Precipitating Factors: Stress, stopping medications.     Epilepsy Risk Factors:  Abnormal pregnancy:                          no  Abnormal birth/:                    no  Abnormal Development:                     no  Febrile seizures, simple:                     no  Febrile seizures, complex:                  no  CNS infection:                                     no  Intellectual disability:                           no  Cerebral palsy:                                    no  Head injury (moderate/severe):          no  CNS neoplasm:                                   no  CNS malformation:                             no  Neurosurgical procedure:                   no  Stroke:                                                 no  Alcohol abuse:                                    no  Drug abuse:                                        no  Family history Sz/epilepsy:                 no     Prior AEDs:  Dilantin (toxicity, cerebellar degeneration, osteoporosis)     Prior Evaluation:  - MRI brain 3/10/2024: Small foci of mildly restricted diffusion within the right corona radiata may represent a late acute to early subacute infarct versus artifact. Asymmetric bilateral cerebellar atrophy relative to supratentorial atrophy. Focal FLAIR signal abnormality within the left corona radiata, nonspecific however favored to represent microangiopathic changes in this age group.      - Routine EEG: Decades, reportedly normal     Psychiatric History:  Depression: None  Anxiety: None  Psychosis: None  Psychiatric Admissions: None    Social History:  Retired from [employer]    History Reviewed:   The following were reviewed and updated as appropriate: allergies, current medications, past family history, past medical history, past social history, past surgical history, and problem list    ROS:  Constitutional:  Negative for appetite change, fatigue and fever.   HENT: Negative.  Negative for hearing loss, tinnitus, trouble swallowing and voice change.    Eyes: Negative.  Negative for photophobia, pain and visual disturbance.   Respiratory: Negative.  Negative for shortness of breath.    Cardiovascular: Negative.  Negative for palpitations.   Gastrointestinal: Negative.  Negative for nausea and vomiting.   Endocrine: Negative.  Negative for cold intolerance.   Genitourinary: Negative.  Negative for dysuria, frequency and urgency.   Musculoskeletal:  Negative for back pain, gait problem, myalgias, neck pain and neck stiffness.   Skin: Negative.  Negative for rash.   Allergic/Immunologic: Negative.    Neurological: Negative.  Negative for dizziness, tremors, seizures, syncope, facial asymmetry, speech difficulty, weakness, light-headedness, numbness and headaches.   Hematological: Negative.  Does not bruise/bleed easily.   Psychiatric/Behavioral: Negative.  Negative for confusion, hallucinations and sleep disturbance.    All other systems reviewed and are negative.      Objective    /72 (BP Location: Left arm, Patient Position: Sitting, Cuff Size: Adult)   Temp 97.7 °F (36.5 °C) (Temporal)   Wt 58.3 kg (128 lb 8 oz)   BMI 24.28 kg/m²      General Exam  General: well developed, no acute distress.  HEENT: mucous membranes moist, anicteric sclera.   Neck: supple, good ROM.  Extremities: no clubbing, cyanosis or edema.   Skin: no rash on visible skin.     Neurological Exam  Mental Status: awake, alert, and fully oriented to person, place, time, and situation. Attention and memory intact. Fund of knowledge is appropriate for age and education.  There is no neglect.     Language:  fluency, and comprehension normal.            Cranial Nerves: Pupils equal and reactive to light.  Visual fields full to confrontation. Extraocular motions intact with full versions, normal pursuits and saccades. Facial strength full and symmetric. Facial sensation intact in V1-V3. Hearing intact to voice. Tongue protrudes to midline. Palate elevates symmetrically. Speech clear without notable dysarthria. Shoulder shrug activation full and symmetric.     Motor: Normal bulk and tone. No pronator drift. Strength is 5/5 proximally and distally in all 4 extremities. No involuntary movements.     Sensory: Sensation intact to light touch distally in all extremities. Mild vibration loss to the malleoli bilaterally.     Coordination: Normal finger-to-nose. Normal rapid alternating movements.      Station and gait: Casual gait normal. Normal Romberg. Difficulty with tandem gait.     Reflexes: Reflexes 2+ in uppers. Mute b/l achilles        Kimani Ba MD   [facility]  Diplomate, ABPN Neurology and Clinical Neurophysiology

## 2024-11-27 LAB
ALBUMIN SERPL ELPH-MCNC: 3.8 G/DL (ref 3.2–5.1)
ALBUMIN SERPL ELPH-MCNC: 55 % (ref 48–70)
ALPHA1 GLOB SERPL ELPH-MCNC: 0.31 G/DL (ref 0.15–0.47)
ALPHA1 GLOB SERPL ELPH-MCNC: 4.5 % (ref 1.8–7)
ALPHA2 GLOB SERPL ELPH-MCNC: 0.87 G/DL (ref 0.42–1.04)
ALPHA2 GLOB SERPL ELPH-MCNC: 12.6 % (ref 5.9–14.9)
BETA GLOB ABNORMAL SERPL ELPH-MCNC: 0.5 G/DL (ref 0.31–0.57)
BETA1 GLOB SERPL ELPH-MCNC: 7.2 % (ref 4.7–7.7)
BETA2 GLOB SERPL ELPH-MCNC: 6.7 % (ref 3.1–7.9)
BETA2+GAMMA GLOB SERPL ELPH-MCNC: 0.46 G/DL (ref 0.2–0.58)
GAMMA GLOB ABNORMAL SERPL ELPH-MCNC: 0.97 G/DL (ref 0.4–1.66)
GAMMA GLOB SERPL ELPH-MCNC: 14 % (ref 6.9–22.3)
IGG/ALB SER: 1.22 {RATIO} (ref 1.1–1.8)
PROT PATTERN SERPL ELPH-IMP: NORMAL
PROT SERPL-MCNC: 6.9 G/DL (ref 6.4–8.2)

## 2024-11-27 PROCEDURE — 84165 PROTEIN E-PHORESIS SERUM: CPT | Performed by: STUDENT IN AN ORGANIZED HEALTH CARE EDUCATION/TRAINING PROGRAM

## 2024-12-02 ENCOUNTER — CLINICAL SUPPORT (OUTPATIENT)
Dept: ENDOCRINOLOGY | Facility: CLINIC | Age: 64
End: 2024-12-02
Payer: COMMERCIAL

## 2024-12-02 VITALS
SYSTOLIC BLOOD PRESSURE: 106 MMHG | HEART RATE: 69 BPM | OXYGEN SATURATION: 97 % | DIASTOLIC BLOOD PRESSURE: 76 MMHG | BODY MASS INDEX: 24.28 KG/M2 | HEIGHT: 61 IN

## 2024-12-02 DIAGNOSIS — M81.0 AGE-RELATED OSTEOPOROSIS WITHOUT CURRENT PATHOLOGICAL FRACTURE: Primary | ICD-10-CM

## 2024-12-02 PROCEDURE — 96372 THER/PROPH/DIAG INJ SC/IM: CPT

## 2024-12-02 NOTE — PROGRESS NOTES
"Assessment/Plan:    Leighann Dolan came into the [facility] today 12/02/24 to receive Evenity injection.      Verbal consent obtained.  Consent given by: patient    patient states patient has been medically healthy with no underlining concerns/complications.      Leighann Dolan presents with no symptoms today.       All insturctions were reviewed with the patient.    If the patient should have any questions/concerns, advised patient to contacted [facility].       Subjective:     History provided by: patient    Patient ID: Leighann Dolan is a 64 y.o. female      Objective:    Vitals:    12/02/24 1320   BP: 106/76   Pulse: 69   SpO2: 97%   Height: 5' 1\" (1.549 m)       Patient tolerated the injection well without any complications.  Injection site/s Left and right arm.  Medication was provided by Office.    Patient signed consent form yes   Patient signed ABN form yes (If no patient is not a medicare patient).   Patient waited 15 minutes after injection no (This only applies to patient's receiving first time injection).       Last Visit: 11/20/2024  Next visit:1/6/2025     "

## 2024-12-03 ENCOUNTER — HOSPITAL ENCOUNTER (OUTPATIENT)
Dept: ULTRASOUND IMAGING | Facility: HOSPITAL | Age: 64
Discharge: HOME | End: 2024-12-03
Attending: INTERNAL MEDICINE
Payer: COMMERCIAL

## 2024-12-03 DIAGNOSIS — E04.2 MULTIPLE THYROID NODULES: ICD-10-CM

## 2024-12-03 PROCEDURE — 76536 US EXAM OF HEAD AND NECK: CPT

## 2024-12-06 ENCOUNTER — RESULTS FOLLOW-UP (OUTPATIENT)
Dept: ENDOCRINOLOGY | Facility: CLINIC | Age: 64
End: 2024-12-06

## 2024-12-06 NOTE — RESULT ENCOUNTER NOTE
Gabe Dolan   Thyroid ultrasound showed stable thyroid nodules.  Will need a follow-up thyroid ultrasound in 1 year  Please follow-up for your appointment as scheduled  Please follow-up for your appointment as scheduled

## 2024-12-20 ENCOUNTER — TELEPHONE (OUTPATIENT)
Dept: ENDOCRINOLOGY | Facility: CLINIC | Age: 64
End: 2024-12-20

## 2024-12-20 NOTE — TELEPHONE ENCOUNTER
I called and spoke to pt regarding that we have to rescheduled her nurse visit, and pt agreed.  Thank you.

## 2025-01-03 ENCOUNTER — TELEPHONE (OUTPATIENT)
Dept: ENDOCRINOLOGY | Facility: CLINIC | Age: 65
End: 2025-01-03

## 2025-01-16 ENCOUNTER — CLINICAL SUPPORT (OUTPATIENT)
Dept: ENDOCRINOLOGY | Facility: CLINIC | Age: 65
End: 2025-01-16
Payer: COMMERCIAL

## 2025-01-16 ENCOUNTER — TELEPHONE (OUTPATIENT)
Age: 65
End: 2025-01-16

## 2025-01-16 VITALS — SYSTOLIC BLOOD PRESSURE: 122 MMHG | HEART RATE: 78 BPM | DIASTOLIC BLOOD PRESSURE: 74 MMHG | OXYGEN SATURATION: 98 %

## 2025-01-16 DIAGNOSIS — M81.0 AGE-RELATED OSTEOPOROSIS WITHOUT CURRENT PATHOLOGICAL FRACTURE: Primary | ICD-10-CM

## 2025-01-16 PROCEDURE — 96372 THER/PROPH/DIAG INJ SC/IM: CPT

## 2025-01-16 NOTE — PROGRESS NOTES
Assessment/Plan:    Leighann Dolan came into the [facility] today 01/16/25 to receive Evenity injection.      Verbal consent obtained.  Consent given by: patient    patient states patient has been medically healthy with no underlining concerns/complications.      Leighann Dolan presents with no symptoms today.       All insturctions were reviewed with the patient.    If the patient should have any questions/concerns, advised patient to contacted [facility].       Subjective:     History provided by: patient    Patient ID: Leighann Dolan is a 64 y.o. female      Objective:    Vitals:    01/16/25 1057   BP: 122/74   Pulse: 78   SpO2: 98%       Patient tolerated the injection well without any complications.  Injection site/s Left and right arm.  Medication was provided by Office.    Patient signed consent form yes   Patient signed ABN form yes (If no patient is not a medicare patient).   Patient waited 15 minutes after injection no (This only applies to patient's receiving first time injection).       Last Visit: 1/3/2025  Next visit:2/19/2025

## 2025-02-18 RX ORDER — AZITHROMYCIN 250 MG/1
250 TABLET, FILM COATED ORAL
COMMUNITY
Start: 2024-12-28

## 2025-02-18 RX ORDER — BENZONATATE 100 MG/1
100 CAPSULE ORAL
COMMUNITY
Start: 2024-12-28

## 2025-02-19 ENCOUNTER — CLINICAL SUPPORT (OUTPATIENT)
Dept: ENDOCRINOLOGY | Facility: CLINIC | Age: 65
End: 2025-02-19
Payer: COMMERCIAL

## 2025-02-19 VITALS — SYSTOLIC BLOOD PRESSURE: 120 MMHG | DIASTOLIC BLOOD PRESSURE: 82 MMHG

## 2025-02-19 DIAGNOSIS — M81.0 AGE-RELATED OSTEOPOROSIS WITHOUT CURRENT PATHOLOGICAL FRACTURE: Primary | ICD-10-CM

## 2025-02-19 PROCEDURE — 96372 THER/PROPH/DIAG INJ SC/IM: CPT

## 2025-02-19 NOTE — PROGRESS NOTES
Assessment/Plan:    Leighann Dolan came into the [facility] today 02/19/25 to receive evenity injection.      Verbal consent obtained.  Consent given by: patient    patient states patient has been medically healthy with no underlining concerns/complications.      Leighann Dolan presents with no symptoms today.       All insturctions were reviewed with the patient.    If the patient should have any questions/concerns, advised patient to contacted [facility].       Subjective:     History provided by: patient    Patient ID: Leighann Dolan is a 64 y.o. female      Objective:    Vitals:    02/19/25 1046   BP: 120/82   BP Location: Left arm   Patient Position: Sitting   Cuff Size: Large       Patient tolerated the injection well without any complications.  Injection site/s right and left arm.  Medication was provided by office.    Patient signed consent form yes   Patient signed ABN form yes (If no patient is not a medicare patient).   Patient waited 15 minutes after injection no (This only applies to patient's receiving first time injection).       Last Visit: 1/16/2025  Next visit:3/21/2025

## 2025-03-21 ENCOUNTER — CLINICAL SUPPORT (OUTPATIENT)
Dept: ENDOCRINOLOGY | Facility: CLINIC | Age: 65
End: 2025-03-21
Payer: COMMERCIAL

## 2025-03-21 VITALS
BODY MASS INDEX: 24.28 KG/M2 | OXYGEN SATURATION: 97 % | DIASTOLIC BLOOD PRESSURE: 80 MMHG | HEART RATE: 72 BPM | HEIGHT: 61 IN | SYSTOLIC BLOOD PRESSURE: 120 MMHG

## 2025-03-21 DIAGNOSIS — M81.0 AGE-RELATED OSTEOPOROSIS WITHOUT CURRENT PATHOLOGICAL FRACTURE: Primary | ICD-10-CM

## 2025-03-21 PROCEDURE — 96372 THER/PROPH/DIAG INJ SC/IM: CPT

## 2025-03-21 NOTE — PROGRESS NOTES
Assessment/Plan:    Leighann Dolan came into the [facility] today 03/21/25 to receive Evenity injection.      Verbal consent obtained.  Consent given by: patient    patient states patient has been medically healthy with no underlining concerns/complications.      Leighann Dolan presents with no symptoms today.       All insturctions were reviewed with the patient.    If the patient should have any questions/concerns, advised patient to contacted [facility].       Subjective:     History provided by: patient    Patient ID: Leighann Dolan is a 64 y.o. female      Objective:    There were no vitals filed for this visit.    Patient tolerated the injection well without any complications.  Injection site/s Left and right arm.  Medication was provided by Office.    Patient signed consent form yes   Patient signed ABN form yes (If no patient is not a medicare patient).   Patient waited 15 minutes after injection no (This only applies to patient's receiving first time injection).       Last Visit: 2/19/2025  Next visit:4/23/2025

## 2025-04-18 ENCOUNTER — APPOINTMENT (OUTPATIENT)
Dept: LAB | Facility: MEDICAL CENTER | Age: 65
End: 2025-04-18
Payer: COMMERCIAL

## 2025-04-18 DIAGNOSIS — R27.0 ATAXIA: ICD-10-CM

## 2025-04-18 DIAGNOSIS — G62.9 PERIPHERAL NEUROPATHY: ICD-10-CM

## 2025-04-18 DIAGNOSIS — E55.9 VITAMIN D DEFICIENCY: ICD-10-CM

## 2025-04-18 DIAGNOSIS — M81.0 AGE-RELATED OSTEOPOROSIS WITHOUT CURRENT PATHOLOGICAL FRACTURE: ICD-10-CM

## 2025-04-18 LAB
25(OH)D3 SERPL-MCNC: 73.4 NG/ML (ref 30–100)
ANION GAP SERPL CALCULATED.3IONS-SCNC: 11 MMOL/L (ref 4–13)
BUN SERPL-MCNC: 18 MG/DL (ref 5–25)
CALCIUM SERPL-MCNC: 9.8 MG/DL (ref 8.4–10.2)
CHLORIDE SERPL-SCNC: 106 MMOL/L (ref 96–108)
CO2 SERPL-SCNC: 25 MMOL/L (ref 21–32)
CREAT SERPL-MCNC: 0.85 MG/DL (ref 0.6–1.3)
GFR SERPL CREATININE-BSD FRML MDRD: 72 ML/MIN/1.73SQ M
GLUCOSE P FAST SERPL-MCNC: 87 MG/DL (ref 65–99)
POTASSIUM SERPL-SCNC: 4.3 MMOL/L (ref 3.5–5.3)
SODIUM SERPL-SCNC: 142 MMOL/L (ref 135–147)
VIT B12 SERPL-MCNC: 514 PG/ML (ref 180–914)

## 2025-04-18 PROCEDURE — 82607 VITAMIN B-12: CPT

## 2025-04-18 PROCEDURE — 36415 COLL VENOUS BLD VENIPUNCTURE: CPT

## 2025-04-18 PROCEDURE — 82306 VITAMIN D 25 HYDROXY: CPT

## 2025-04-18 PROCEDURE — 80048 BASIC METABOLIC PNL TOTAL CA: CPT

## 2025-04-23 ENCOUNTER — OFFICE VISIT (OUTPATIENT)
Dept: ENDOCRINOLOGY | Facility: CLINIC | Age: 65
End: 2025-04-23
Payer: COMMERCIAL

## 2025-04-23 VITALS
HEART RATE: 76 BPM | HEIGHT: 61 IN | SYSTOLIC BLOOD PRESSURE: 118 MMHG | OXYGEN SATURATION: 96 % | WEIGHT: 126 LBS | DIASTOLIC BLOOD PRESSURE: 78 MMHG | BODY MASS INDEX: 23.79 KG/M2

## 2025-04-23 DIAGNOSIS — E55.9 VITAMIN D DEFICIENCY: ICD-10-CM

## 2025-04-23 DIAGNOSIS — E04.1 THYROID NODULE: ICD-10-CM

## 2025-04-23 DIAGNOSIS — M81.0 AGE-RELATED OSTEOPOROSIS WITHOUT CURRENT PATHOLOGICAL FRACTURE: Primary | ICD-10-CM

## 2025-04-23 DIAGNOSIS — E21.3 HYPERPARATHYROIDISM (HCC): ICD-10-CM

## 2025-04-23 PROCEDURE — 96372 THER/PROPH/DIAG INJ SC/IM: CPT | Performed by: PHYSICIAN ASSISTANT

## 2025-04-23 PROCEDURE — 99214 OFFICE O/P EST MOD 30 MIN: CPT | Performed by: PHYSICIAN ASSISTANT

## 2025-04-23 RX ORDER — PREDNISONE 5 MG/1
5 TABLET ORAL
COMMUNITY
Start: 2025-01-23

## 2025-04-23 RX ORDER — DOXYCYCLINE 100 MG/1
1 CAPSULE ORAL 2 TIMES DAILY
COMMUNITY
Start: 2025-01-23

## 2025-04-23 NOTE — ASSESSMENT & PLAN NOTE
Thyroid ultrasound done in December 2024 showed multiple nodules which do not meet criteria for biopsy but a follow-up ultrasound was recommended in 1 year

## 2025-04-23 NOTE — ASSESSMENT & PLAN NOTE
Vitamin D normal at 73.4  Continue current supplementation  Orders:    Vitamin D 25 hydroxy; Future

## 2025-04-23 NOTE — PROGRESS NOTES
Name: Leighann Dolan      : 1960      MRN: [id number]  Encounter Provider: Lizzie Hunt PA-C  Encounter Date: 2025   Encounter [facility]    CC: osteoporosis  Assessment & Plan  Age-related osteoporosis without current pathological fracture  Completed 12th injection of Evenity today  Transition to Prolia next month.  Discussed side effects, safety/efficacy  Recheck calcium levels 1-2 weeks after Prolia injection. In the past she has tolerated Prolia well.  Continue vitamin D supplementation. Take calcium 500 mg daily.  Take precautions toward falls  Next DEXA scan due in 2026  Do weightbearing exercises as tolerated  Orders:    Basic metabolic panel; Future    romosozumab-aqqg (EVENITY) subcutaneous injection 210 mg    Comprehensive metabolic panel; Future    Vitamin D deficiency  Vitamin D normal at 73.4  Continue current supplementation  Orders:    Vitamin D 25 hydroxy; Future    Thyroid nodule  Thyroid ultrasound done in 2024 showed multiple nodules which do not meet criteria for biopsy but a follow-up ultrasound was recommended in 1 year       Hyperparathyroidism (HCC)  S/p parathyroid surgery           History of Present Illness     Leighann Dolan is a 65 y.o. female here for follow-up of osteoporosis, vitamin D deficiency, multiple thyroid nodules.  She has a past medical history of primary hyperparathyroidism status post parathyroid adenoma surgery with normalization of calcium.  She has a history of osteoporosis and previously was treated with Prolia injections however it was denied by her insurance and she was eventually switched to Fosamax.  Due to noncompliance to Fosamax she was started on Evenity as her DEXA scan in 2024 showed severe osteoporosis.  She received her first Evenity injection in 2024.  She is due for her 12th Evenity injection today.  The plan is to transition to Prolia; she is already has  approval for it. She also takes vitamin D3 1000 or 2000 IU daily. She is taking B12 which states has some calcium as well but she is not taking a separate calcium supplement.  She does not do weightbearing exercises.  No recent fractures. She did have an accidental fall on the treadmill last week.  She does have a history of thyroid nodules. Her daughter had thyroid cancer.   Last ultrasound, December 2024  FINDINGS:  Thyroid texture: Thyroid parenchyma is diffusely heterogeneous in echotexture with focal nodule(s) as described below.     Right lobe: 4.9 x 2.4 x 1.8 cm. Volume 10.3 mL  Left lobe: 4.5 x 1.6 x 1.4 cm. Volume 4.7 mL  Isthmus: 0.5 cm.     Nodule #1. Image 14.  RIGHT midgland nodule measuring 1.8 x 0.8 x 0.8 cm. Unchanged from prior.  COMPOSITION: 1 point, mixed cystic and solid.  ECHOGENICITY: 1 point, hyperechoic or isoechoic.  SHAPE: 0 points, wider-than-tall.  MARGIN: 0 points, smooth.  ECHOGENIC FOCI: 0 points, none or large comet-tail artifacts.  TI-RADS Classification: TR 2 (2 points), Not suspicious. No FNA.     Nodule #2. Image 51.  LEFT midgland nodule measuring 1.0 x 0.7 x 0.7 cm. Unchanged from prior.  COMPOSITION: 2 points, solid or almost completely solid.  ECHOGENICITY: 2 points, hypoechoic.  SHAPE: 0 points, wider-than-tall.  MARGIN: 0 points, smooth.  ECHOGENIC FOCI: 1 point, macrocalcifications.  TI-RADS Classification: TR 4 (4-6 points). FNA if >/= 1.5 cm. Follow if >/= 1 cm.     Nodule #3. Image 59.  LEFT lower pole nodule measuring 1.3 x 0.9 x 1.5 cm. Unchanged from prior.  COMPOSITION: 2 points, solid or almost completely solid.  ECHOGENICITY: 1 point, hyperechoic or isoechoic.  SHAPE: 0 points, wider-than-tall.  MARGIN: 0 points, smooth.  ECHOGENIC FOCI: 0 points, none or large comet-tail artifacts.  TI-RADS Classification: TR 2 (2 points), Not suspicious. No FNA.        IMPRESSION:     No nodule meets current ACR criteria for requiring biopsy but follow-up ultrasound is  "recommended in 1 year.    Review of Systems   Constitutional:  Negative for activity change, appetite change, fatigue and unexpected weight change.   HENT:  Negative for trouble swallowing.    Eyes:  Negative for visual disturbance.   Respiratory:  Negative for shortness of breath.    Cardiovascular:  Negative for chest pain and palpitations.   Gastrointestinal:  Negative for constipation and diarrhea.   Endocrine: Negative for polydipsia and polyuria.   Musculoskeletal: Negative.    Skin:  Positive for rash (abrasion on right knee from a fall).   Neurological:  Negative for numbness (altered sensation of feet, not quite numb. been stable for a couple years).   Psychiatric/Behavioral: Negative.      as per HPI  Current Outpatient Medications on File Prior to Visit   Medication Sig Dispense Refill    atorvastatin (LIPITOR) 40 mg tablet Take 1 tablet (40 mg total) by mouth daily at bedtime 120 tablet 2    azithromycin (ZITHROMAX) 250 mg tablet 250 mg      benzonatate (TESSALON PERLES) 100 mg capsule Take 100 mg by mouth      cholecalciferol (VITAMIN D3) 25 mcg (1,000 units) tablet Take 1,000 Units by mouth      Cyanocobalamin (Vitamin B12) 1000 MCG TBCR Take by mouth      diphenhydrAMINE (Benadryl Allergy) 25 mg capsule Take by mouth if needed      doxycycline hyclate (VIBRAMYCIN) 100 mg capsule Take 1 capsule by mouth 2 (two) times a day      Krill Oil 1000 MG CAPS       levETIRAcetam (KEPPRA) 500 mg tablet Take 1 tablet (500 mg total) by mouth every 12 (twelve) hours 180 tablet 3    predniSONE 5 mg tablet Take 5 mg by mouth      aspirin (ECOTRIN LOW STRENGTH) 81 mg EC tablet Take 1 tablet (81 mg total) by mouth daily 30 tablet 0     No current facility-administered medications on file prior to visit.         Medical History Reviewed by provider this encounter:  Tobacco  Allergies  Meds  Problems  Med Hx  Surg Hx  Fam Hx     .    Objective   /78   Pulse 76   Ht 5' 1\" (1.549 m)   Wt 57.2 kg (126 lb)   " SpO2 96%   BMI 23.81 kg/m²      Body mass index is 23.81 kg/m².  Wt Readings from Last 3 Encounters:   04/23/25 57.2 kg (126 lb)   11/25/24 58.3 kg (128 lb 8 oz)   11/20/24 57.6 kg (127 lb)     Physical Exam  Vitals and nursing note reviewed.   Constitutional:       Appearance: She is well-developed.   HENT:      Head: Normocephalic.   Eyes:      General: No scleral icterus.  Neck:      Thyroid: No thyromegaly.   Cardiovascular:      Rate and Rhythm: Normal rate and regular rhythm.      Pulses:           Radial pulses are 2+ on the right side and 2+ on the left side.      Heart sounds: No murmur heard.  Pulmonary:      Effort: Pulmonary effort is normal. No respiratory distress.      Breath sounds: Normal breath sounds. No wheezing.   Musculoskeletal:      Cervical back: Neck supple.   Skin:     General: Skin is warm and dry.   Neurological:      Mental Status: She is alert.         Labs:   Component      Latest Ref Rng 3/12/2024 8/3/2024 4/18/2025   Sodium      135 - 147 mmol/L 139   142    Potassium      3.5 - 5.3 mmol/L 4.1   4.3    Chloride      96 - 108 mmol/L 107   106    Carbon Dioxide      21 - 32 mmol/L 28   25    ANION GAP      4 - 13 mmol/L 4   11    BUN      5 - 25 mg/dL 16   18    Creatinine      0.60 - 1.30 mg/dL 0.61   0.85    GLUCOSE      65 - 140 mg/dL 95      Calcium      8.4 - 10.2 mg/dL 8.9   9.8    GFR, Calculated      ml/min/1.73sq m 97   72    GLUCOSE, FASTING      65 - 99 mg/dL   87    VITAMIN D, 25-HYDROXY      30.0 - 100.0 ng/mL  63.8  73.4          There are no Patient Instructions on file for this visit.    Discussed with the patient and all questioned fully answered. She will call me if any problems arise.    Administrative Statements   I have spent a total time of 30 minutes in caring for this patient on the day of the visit/encounter including Importance of tx compliance, Documenting in the medical record, Reviewing/placing orders in the medical record (including tests, medications,  and/or procedures), and Obtaining or reviewing history  .

## 2025-04-23 NOTE — ASSESSMENT & PLAN NOTE
Completed 12th injection of Evenity today  Transition to Prolia next month.  Discussed side effects, safety/efficacy  Recheck calcium levels 1-2 weeks after Prolia injection. In the past she has tolerated Prolia well.  Continue vitamin D supplementation. Take calcium 500 mg daily.  Take precautions toward falls  Next DEXA scan due in March 2026  Do weightbearing exercises as tolerated  Orders:    Basic metabolic panel; Future    romosozumab-aqqg (EVENITY) subcutaneous injection 210 mg    Comprehensive metabolic panel; Future

## 2025-04-23 NOTE — PROGRESS NOTES
"Assessment/Plan:    Leighann Dolan came into the [facility] today 04/23/25 to receive Evenity injection.      Verbal consent obtained.  Consent given by: patient    patient states patient has been medically healthy with no underlining concerns/complications.      Leighann Dolan presents with no symptoms today.       All insturctions were reviewed with the patient.    If the patient should have any questions/concerns, advised patient to contacted [facility].       Subjective:     History provided by: patient    Patient ID: Leighann Dolan is a 65 y.o. female      Objective:    Vitals:    04/23/25 1435   BP: 118/78   Pulse: 76   SpO2: 96%   Weight: 57.2 kg (126 lb)   Height: 5' 1\" (1.549 m)       Patient tolerated the injection well without any complications.  Injection site/s Left and right arm.  Medication was provided by Office.    Patient signed consent form yes   Patient signed ABN form yes (If no patient is not a medicare patient).   Patient waited 15 minutes after injection no (This only applies to patient's receiving first time injection).       Last Visit: 3/21/2025  Next visit:5/20/2025     "

## 2025-04-24 ENCOUNTER — RESULTS FOLLOW-UP (OUTPATIENT)
Dept: NEUROLOGY | Facility: CLINIC | Age: 65
End: 2025-04-24

## 2025-05-13 DIAGNOSIS — E78.5 HYPERLIPIDEMIA, UNSPECIFIED HYPERLIPIDEMIA TYPE: ICD-10-CM

## 2025-05-13 RX ORDER — ATORVASTATIN CALCIUM 40 MG/1
40 TABLET, FILM COATED ORAL
Qty: 30 TABLET | Refills: 0 | Status: SHIPPED | OUTPATIENT
Start: 2025-05-13

## 2025-05-13 NOTE — TELEPHONE ENCOUNTER
Last ov 11/25/24-follow up in 6mon - follow up scheduled 6/4/25  Courtesy provided to the follow up

## 2025-05-20 ENCOUNTER — CLINICAL SUPPORT (OUTPATIENT)
Dept: ENDOCRINOLOGY | Facility: CLINIC | Age: 65
End: 2025-05-20
Payer: COMMERCIAL

## 2025-05-20 VITALS
SYSTOLIC BLOOD PRESSURE: 112 MMHG | HEIGHT: 61 IN | HEART RATE: 66 BPM | OXYGEN SATURATION: 99 % | BODY MASS INDEX: 23.81 KG/M2 | DIASTOLIC BLOOD PRESSURE: 76 MMHG

## 2025-05-20 DIAGNOSIS — M81.0 AGE-RELATED OSTEOPOROSIS WITHOUT CURRENT PATHOLOGICAL FRACTURE: Primary | ICD-10-CM

## 2025-05-20 PROCEDURE — 96372 THER/PROPH/DIAG INJ SC/IM: CPT

## 2025-05-20 NOTE — PROGRESS NOTES
"Assessment/Plan:    Leighann Dolan came into the [facility] today 05/20/25 to receive Prolia injection.      Verbal consent obtained.  Consent given by: patient    patient states patient has been medically healthy with no underlining concerns/complications.      Leighann Dolan presents with no symptoms today.       All insturctions were reviewed with the patient.    If the patient should have any questions/concerns, advised patient to contacted [facility].       Subjective:     History provided by: patient    Patient ID: Leighann Dolan is a 65 y.o. female      Objective:    Vitals:    05/20/25 1326   BP: 112/76   Pulse: 66   SpO2: 99%   Height: 5' 1\" (1.549 m)       Patient tolerated the injection well without any complications.  Injection site/s Left arm.  Medication was provided by Office.    Patient signed consent form yes   Patient signed ABN form yes (If no patient is not a medicare patient).   Patient waited 15 minutes after injection yes (This only applies to patient's receiving first time injection).       Last Visit: 4/23/2025  Next visit:Visit date not found     "

## 2025-06-03 ENCOUNTER — APPOINTMENT (OUTPATIENT)
Dept: LAB | Facility: MEDICAL CENTER | Age: 65
End: 2025-06-03
Attending: FAMILY MEDICINE
Payer: COMMERCIAL

## 2025-06-03 DIAGNOSIS — E55.9 VITAMIN D DEFICIENCY: ICD-10-CM

## 2025-06-03 DIAGNOSIS — M81.0 AGE-RELATED OSTEOPOROSIS WITHOUT CURRENT PATHOLOGICAL FRACTURE: ICD-10-CM

## 2025-06-04 ENCOUNTER — OFFICE VISIT (OUTPATIENT)
Dept: NEUROLOGY | Facility: CLINIC | Age: 65
End: 2025-06-04
Payer: COMMERCIAL

## 2025-06-04 ENCOUNTER — HOSPITAL ENCOUNTER (OUTPATIENT)
Dept: MAMMOGRAPHY | Facility: HOSPITAL | Age: 65
Discharge: HOME | End: 2025-06-04
Payer: COMMERCIAL

## 2025-06-04 VITALS
HEART RATE: 66 BPM | WEIGHT: 126.7 LBS | SYSTOLIC BLOOD PRESSURE: 116 MMHG | DIASTOLIC BLOOD PRESSURE: 78 MMHG | TEMPERATURE: 98.2 F | BODY MASS INDEX: 23.92 KG/M2 | OXYGEN SATURATION: 95 % | HEIGHT: 61 IN

## 2025-06-04 VITALS — WEIGHT: 126.76 LBS | HEIGHT: 61 IN | BODY MASS INDEX: 23.93 KG/M2

## 2025-06-04 DIAGNOSIS — I63.81 LACUNAR INFARCTION (HCC): ICD-10-CM

## 2025-06-04 DIAGNOSIS — G40.909 SEIZURE DISORDER (HCC): ICD-10-CM

## 2025-06-04 DIAGNOSIS — G62.9 PERIPHERAL NEUROPATHY: ICD-10-CM

## 2025-06-04 DIAGNOSIS — G47.00 INSOMNIA: Primary | ICD-10-CM

## 2025-06-04 DIAGNOSIS — Z12.31 ENCOUNTER FOR SCREENING MAMMOGRAM FOR MALIGNANT NEOPLASM OF BREAST: ICD-10-CM

## 2025-06-04 DIAGNOSIS — R27.0 ATAXIA: ICD-10-CM

## 2025-06-04 PROCEDURE — 99215 OFFICE O/P EST HI 40 MIN: CPT | Performed by: STUDENT IN AN ORGANIZED HEALTH CARE EDUCATION/TRAINING PROGRAM

## 2025-06-04 PROCEDURE — 77067 SCR MAMMO BI INCL CAD: CPT

## 2025-06-04 PROCEDURE — 77063 BREAST TOMOSYNTHESIS BI: CPT

## 2025-06-04 RX ORDER — LEVETIRACETAM 500 MG/1
500 TABLET ORAL EVERY 12 HOURS SCHEDULED
Qty: 180 TABLET | Refills: 3 | Status: SHIPPED | OUTPATIENT
Start: 2025-06-04 | End: 2026-05-30

## 2025-06-04 NOTE — PROGRESS NOTES
[facility] -   Follow up appointment    Impression/Plan    Leighann Dolan is a 65 y.o. female with a PMH of epilepsy, corona radiata infarct, peripheral neuropathy and an episode of phenytoin toxicity presenting to the [facility] for follow up. Her neurological exam shows a stable moderate peripheral neuropathy. She remains seizure free since last seen. Her most significant issues is insomnia. Will refer to sleep medicine fore this. Plan outlined below:    #Symptomatic epilepsy  - Keppra 500 BID     #Peripheral neuropathy  - c/w B12 supplementation  - Pre-diabetic management by PCP    #insomnia  - Refer to sleep medicine    #Secondary stroke prevention  - C/w lipitor and 81 mg aspirin    - Follow up in 6 months    We discussed the pathophysiology of epilepsy/seizure and seizure safety/precautions including driving restrictions following seizures. We discussed factors that can lower seizure threshold and the side effects of antiepileptic medications.     Diagnoses and all orders for this visit:    Insomnia  -     Ambulatory Referral to Sleep Medicine; Future    Ataxia  -     levETIRAcetam (KEPPRA) 500 mg tablet; Take 1 tablet (500 mg total) by mouth every 12 (twelve) hours    Seizure disorder (HCC)    Peripheral neuropathy    Lacunar infarction (HCC)    Other orders  -     CALCIUM PO; Take by mouth        Subjective    Leighann Dolan is a 65 y.o. female with a PMH of epilepsy, corona radiata infarct, peripheral neuropathy and an episode of phenytoin toxicity presenting to the [facility] for follow up.     For review:     She had first seizure was around 9-11. Many are focal aware seizures because she will had left arm failing and vision changes (like a static TV screen). She has had a total of 4-5 FTBTC seizures her life, and about 6 of FAS. Most were triggered by stress. Attempts to wean medications in the past have failed.      Her last  seizure was in 2001. She can't remember if there was any trigger then.      She presented to the hospital for a fall on 3/9/2024 to due falls and an ataxic gait. Due to phenytoin toxicity, she was transitioned from phenytoin to levetiracetam. She was on phenytoin for 40 years, prior attempt to wean off resulted in breakthrough seizure (was not started on alternative AED during weaning process in the past). MRI brain during hospitalization found questionable CVA versus artifact was seen in the left corona radiata as well as asymmetrical bilateral cerebral atrophy likely related to her long standing phenytoin use. Neurology recommended the patient to start taking a baby aspirin as well as a statin after lipid panel showed hyperlipidemia. She was also to follow up with ENT due to complete opacification of her left sphenoid sinus suggesting possible inspissated debris versus fungal infection. She was to transition to Keppra 500 BID prior to discharge and stop the phenytoin.      She established care with me on 5/24/2024. Physical examination showed a peripheral neuropathy. It was felt that the dilantin was responsible for this, in addition to osteoporosis and cerebellar atrophy. Plan at the time was to continue secondary stroke prevention measures (lipitor, aspirin), complete bloodwork for peripheral neuropathy, and check Keppra level.    The patient was last seen in clinic on 11/25/2024. Neuropathy was stable and no breakthrough seizures. Plan at that time was continue with medication unchanged.    Interval history:    Since last seen, the patient has been doing well. There have been no seizures since the last appointment. The patients AEDs were being tolerated well with no side effects. There is no concern for medication non-adherence.     She sleeps only 3-4 hours a night. She takes naps most days for 30-60 minutes. Has trouble going and staying asleep.     Her balance is stable and still not ideal. It feels numb and  tingly at the same time. Walking on uneven ground is tough.     Current seizure medications:  - levetiracetam 500 mg BID  Other medications as per Epic     Special Features  Status epilepticus: None  Self Injury Seizures: None  Precipitating Factors: Stress, stopping medications.     Epilepsy Risk Factors:  Abnormal pregnancy:                          no  Abnormal birth/:                    no  Abnormal Development:                     no  Febrile seizures, simple:                     no  Febrile seizures, complex:                  no  CNS infection:                                     no  Intellectual disability:                           no  Cerebral palsy:                                    no  Head injury (moderate/severe):          no  CNS neoplasm:                                   no  CNS malformation:                             no  Neurosurgical procedure:                   no  Stroke:                                                 no  Alcohol abuse:                                    no  Drug abuse:                                        no  Family history Sz/epilepsy:                 no     Prior AEDs:  Dilantin (toxicity, cerebellar degeneration, osteoporosis)     Prior Evaluation:  - MRI brain 3/10/2024: Small foci of mildly restricted diffusion within the right corona radiata may represent a late acute to early subacute infarct versus artifact. Asymmetric bilateral cerebellar atrophy relative to supratentorial atrophy. Focal FLAIR signal abnormality within the left corona radiata, nonspecific however favored to represent microangiopathic changes in this age group.      - Routine EEG: Decades ago, reportedly normal    History Reviewed:   The following were reviewed and updated as appropriate: allergies, current medications, past family history, past medical history, past social history, past surgical history, and problem list    ROS:  Constitutional:  Negative for chills and fever.   HENT:   "Negative for ear pain and sore throat.    Eyes:  Negative for pain and visual disturbance.   Respiratory:  Negative for cough and shortness of breath.    Cardiovascular:  Negative for chest pain and palpitations.   Gastrointestinal:  Negative for abdominal pain and vomiting.   Genitourinary:  Negative for dysuria and hematuria.   Musculoskeletal:  Negative for arthralgias and back pain.   Skin:  Negative for color change and rash.   Neurological:  Negative for seizures and syncope.   All other systems reviewed and are negative.         Objective    /78 (BP Location: Right arm, Patient Position: Sitting, Cuff Size: Standard)   Pulse 66   Temp 98.2 °F (36.8 °C) (Temporal)   Ht 5' 1\" (1.549 m)   Wt 57.5 kg (126 lb 11.2 oz)   SpO2 95%   BMI 23.94 kg/m²      General Exam  General: well developed, no acute distress.  HEENT: mucous membranes moist, anicteric sclera.   Neck: supple, good ROM.  Extremities: no clubbing, cyanosis or edema.   Skin: no rash on visible skin.    Neurological Exam  Mental Status: awake, alert, and fully oriented to person, place, time, and situation. Attention and memory intact. Fund of knowledge is appropriate for age and education.  There is no neglect.    Language: fluency, and comprehension normal.       Cranial Nerves: Pupils equal and reactive to light.  Visual fields full to confrontation. Extraocular motions intact with full versions, normal pursuits and saccades. Facial strength full and symmetric. Hearing intact to voice. Tongue protrudes to midline. Palate elevates symmetrically. Speech clear without notable dysarthria.     Motor: Normal bulk and tone. No pronator drift. Strength is 5/5 proximally and distally in all 4 extremities. No involuntary movements.    Sensory: Sensation intact to light touch distally in all extremities. Vibration loss in 1st toes b/l. Mild loss in malleoli b/l.    Coordination: Normal finger-to-nose. Normal rapid alternating movements.     Station " and gait: Casual normal. Moderate sway with Romberg. Unable to perform tandem gait.    Reflexes: Reflexes 2+ throughout and symmetric, 1+ patella, mute achilles.      Kimani Ba MD   [facility]  Diplomate, ABPN Neurology and Clinical Neurophysiology

## 2025-06-12 ENCOUNTER — APPOINTMENT (OUTPATIENT)
Dept: RADIOLOGY | Facility: MEDICAL CENTER | Age: 65
End: 2025-06-12
Payer: COMMERCIAL

## 2025-06-12 DIAGNOSIS — J22 UNSPECIFIED ACUTE LOWER RESPIRATORY INFECTION: ICD-10-CM

## 2025-06-12 PROCEDURE — 71046 X-RAY EXAM CHEST 2 VIEWS: CPT

## 2025-07-07 ENCOUNTER — TELEPHONE (OUTPATIENT)
Dept: MAMMOGRAPHY | Facility: CLINIC | Age: 65
End: 2025-07-07

## 2025-07-22 ENCOUNTER — HOSPITAL ENCOUNTER (OUTPATIENT)
Dept: MAMMOGRAPHY | Facility: CLINIC | Age: 65
Discharge: HOME | End: 2025-07-22
Attending: FAMILY MEDICINE
Payer: COMMERCIAL

## 2025-07-22 ENCOUNTER — HOSPITAL ENCOUNTER (OUTPATIENT)
Dept: ULTRASOUND IMAGING | Facility: CLINIC | Age: 65
Discharge: HOME | End: 2025-07-22
Attending: FAMILY MEDICINE
Payer: COMMERCIAL

## 2025-07-22 VITALS — WEIGHT: 126 LBS | HEIGHT: 61 IN | BODY MASS INDEX: 23.79 KG/M2

## 2025-07-22 DIAGNOSIS — R92.8 ABNORMAL SCREENING MAMMOGRAM: ICD-10-CM

## 2025-07-22 PROCEDURE — 77065 DX MAMMO INCL CAD UNI: CPT

## 2025-07-22 PROCEDURE — G0279 TOMOSYNTHESIS, MAMMO: HCPCS

## 2025-07-22 PROCEDURE — 76642 ULTRASOUND BREAST LIMITED: CPT
